# Patient Record
Sex: FEMALE | Race: WHITE | Employment: FULL TIME | URBAN - NONMETROPOLITAN AREA
[De-identification: names, ages, dates, MRNs, and addresses within clinical notes are randomized per-mention and may not be internally consistent; named-entity substitution may affect disease eponyms.]

---

## 2017-05-29 ENCOUNTER — HOSPITAL ENCOUNTER (EMERGENCY)
Age: 32
Discharge: HOME | End: 2017-05-29
Attending: FAMILY MEDICINE
Payer: COMMERCIAL

## 2017-05-29 VITALS
OXYGEN SATURATION: 99 % | RESPIRATION RATE: 18 BRPM | HEART RATE: 85 BPM | TEMPERATURE: 98.7 F | DIASTOLIC BLOOD PRESSURE: 72 MMHG | SYSTOLIC BLOOD PRESSURE: 131 MMHG

## 2017-05-29 DIAGNOSIS — M62.838 TRAPEZIUS MUSCLE SPASM: Primary | ICD-10-CM

## 2017-05-29 DIAGNOSIS — M54.2 NECK PAIN: ICD-10-CM

## 2017-05-29 PROCEDURE — 99283 EMERGENCY DEPT VISIT LOW MDM: CPT

## 2017-05-29 RX ORDER — CYCLOBENZAPRINE HCL 10 MG
10 TABLET ORAL 3 TIMES DAILY PRN
Qty: 10 TABLET | Refills: 0 | Status: SHIPPED | OUTPATIENT
Start: 2017-05-29 | End: 2017-06-08

## 2017-05-29 RX ORDER — OXYCODONE HYDROCHLORIDE AND ACETAMINOPHEN 5; 325 MG/1; MG/1
1 TABLET ORAL EVERY 4 HOURS PRN
COMMUNITY
End: 2018-01-05 | Stop reason: ALTCHOICE

## 2017-05-29 RX ORDER — IBUPROFEN 800 MG/1
800 TABLET ORAL EVERY 6 HOURS PRN
Qty: 30 TABLET | Refills: 0 | Status: SHIPPED | OUTPATIENT
Start: 2017-05-29 | End: 2019-02-04

## 2017-05-29 RX ORDER — HYDROCODONE BITARTRATE AND ACETAMINOPHEN 5; 325 MG/1; MG/1
1 TABLET ORAL EVERY 6 HOURS PRN
Qty: 10 TABLET | Refills: 0 | Status: SHIPPED | OUTPATIENT
Start: 2017-05-29 | End: 2018-01-05 | Stop reason: ALTCHOICE

## 2017-05-29 ASSESSMENT — PAIN DESCRIPTION - PAIN TYPE: TYPE: ACUTE PAIN

## 2017-05-29 ASSESSMENT — PAIN DESCRIPTION - LOCATION: LOCATION: NECK

## 2017-05-29 ASSESSMENT — PAIN SCALES - GENERAL: PAINLEVEL_OUTOF10: 9

## 2018-01-05 ENCOUNTER — OFFICE VISIT (OUTPATIENT)
Dept: OBGYN | Age: 33
End: 2018-01-05
Payer: COMMERCIAL

## 2018-01-05 ENCOUNTER — HOSPITAL ENCOUNTER (OUTPATIENT)
Age: 33
Setting detail: SPECIMEN
Discharge: HOME | End: 2018-01-05
Payer: COMMERCIAL

## 2018-01-05 VITALS
WEIGHT: 194.8 LBS | DIASTOLIC BLOOD PRESSURE: 64 MMHG | SYSTOLIC BLOOD PRESSURE: 118 MMHG | HEIGHT: 64 IN | BODY MASS INDEX: 33.26 KG/M2

## 2018-01-05 DIAGNOSIS — Z01.419 WOMEN'S ANNUAL ROUTINE GYNECOLOGICAL EXAMINATION: Primary | ICD-10-CM

## 2018-01-05 DIAGNOSIS — Z01.419 WOMEN'S ANNUAL ROUTINE GYNECOLOGICAL EXAMINATION: ICD-10-CM

## 2018-01-05 PROCEDURE — 99385 PREV VISIT NEW AGE 18-39: CPT | Performed by: OBSTETRICS & GYNECOLOGY

## 2018-01-05 PROCEDURE — G0145 SCR C/V CYTO,THINLAYER,RESCR: HCPCS

## 2018-01-05 ASSESSMENT — PATIENT HEALTH QUESTIONNAIRE - PHQ9
1. LITTLE INTEREST OR PLEASURE IN DOING THINGS: 0
SUM OF ALL RESPONSES TO PHQ QUESTIONS 1-9: 0
2. FEELING DOWN, DEPRESSED OR HOPELESS: 0
SUM OF ALL RESPONSES TO PHQ9 QUESTIONS 1 & 2: 0

## 2018-01-05 NOTE — PROGRESS NOTES
YEARLY PHYSICAL    Date of service: 2018    Ruth Mcintosh  Is a 34 y.o.   female    PT's PCP is: No primary care provider on file. : 1985                                             Subjective:       Patient's last menstrual period was 2017. Are your menses regular: yes    OB History    Para Term  AB Living   3 1 1   2     SAB TAB Ectopic Molar Multiple Live Births   2                # Outcome Date GA Lbr Luis/2nd Weight Sex Delivery Anes PTL Lv   3 Term 10/29/13 39w2d  7 lb 9.2 oz (3.435 kg) M CS-Unspec      2 2012 6w0d          1 2011 6w0d                  History   Smoking Status    Never Smoker   Smokeless Tobacco    Never Used        History   Alcohol Use No       Family History   Problem Relation Age of Onset    High Blood Pressure Father        Allergies: Sulfa antibiotics      Current Outpatient Prescriptions:     ibuprofen (ADVIL;MOTRIN) 800 MG tablet, Take 1 tablet by mouth every 6 hours as needed for Pain, Disp: 30 tablet, Rfl: 0    prenatal vitamin (PRENATAL-S) 27-0.8 MG TABS, Take 1 tablet by mouth daily. , Disp: , Rfl:     FISH OIL, by Does not apply route., Disp: , Rfl:     tamsulosin (FLOMAX) 0.4 MG capsule, Take 1 capsule by mouth daily for 10 days. , Disp: 10 capsule, Rfl: 0    History   Sexual Activity    Sexual activity: Yes    Partners: Male       Any bleeding or pain with intercourse: No    Last Yearly:  2013    Last pap: 2013    Last HPV: never    Last Mammogram: never    Last Dexascan never    Do you do self breast exams: Yes    Past Medical History:   Diagnosis Date    Kidney calculi        Past Surgical History:   Procedure Laterality Date     SECTION  10/29/2013    CHOLECYSTECTOMY      DILATION AND CURETTAGE OF UTERUS         Family History   Problem Relation Age of Onset    High Blood Pressure Father        Any family history of breast or

## 2018-01-16 LAB — CYTOLOGY REPORT: NORMAL

## 2018-12-11 ENCOUNTER — HOSPITAL ENCOUNTER (OUTPATIENT)
Age: 33
Discharge: HOME | End: 2018-12-11
Payer: COMMERCIAL

## 2018-12-11 LAB
ABSOLUTE EOS #: 0.11 K/UL (ref 0–0.44)
ABSOLUTE IMMATURE GRANULOCYTE: 0.05 K/UL (ref 0–0.3)
ABSOLUTE LYMPH #: 1.91 K/UL (ref 1.1–3.7)
ABSOLUTE MONO #: 0.63 K/UL (ref 0.1–1.2)
ALBUMIN SERPL-MCNC: 4.3 G/DL (ref 3.5–5.2)
ALBUMIN/GLOBULIN RATIO: 1.3 (ref 1–2.5)
ALP BLD-CCNC: 63 U/L (ref 35–104)
ALT SERPL-CCNC: 13 U/L (ref 5–33)
ANION GAP SERPL CALCULATED.3IONS-SCNC: 9 MMOL/L (ref 9–17)
AST SERPL-CCNC: 17 U/L
BASOPHILS # BLD: 1 % (ref 0–2)
BASOPHILS ABSOLUTE: 0.08 K/UL (ref 0–0.2)
BILIRUB SERPL-MCNC: 0.6 MG/DL (ref 0.3–1.2)
BUN BLDV-MCNC: 11 MG/DL (ref 6–20)
BUN/CREAT BLD: 18 (ref 9–20)
CALCIUM SERPL-MCNC: 9.6 MG/DL (ref 8.6–10.4)
CHLORIDE BLD-SCNC: 102 MMOL/L (ref 98–107)
CHOLESTEROL/HDL RATIO: 2.9
CHOLESTEROL: 121 MG/DL
CO2: 26 MMOL/L (ref 20–31)
CREAT SERPL-MCNC: 0.61 MG/DL (ref 0.5–0.9)
DIFFERENTIAL TYPE: ABNORMAL
EOSINOPHILS RELATIVE PERCENT: 1 % (ref 1–4)
GFR AFRICAN AMERICAN: >60 ML/MIN
GFR NON-AFRICAN AMERICAN: >60 ML/MIN
GFR SERPL CREATININE-BSD FRML MDRD: ABNORMAL ML/MIN/{1.73_M2}
GFR SERPL CREATININE-BSD FRML MDRD: ABNORMAL ML/MIN/{1.73_M2}
GLUCOSE BLD-MCNC: 111 MG/DL (ref 70–99)
HCT VFR BLD CALC: 43 % (ref 36.3–47.1)
HDLC SERPL-MCNC: 42 MG/DL
HEMOGLOBIN: 13.6 G/DL (ref 11.9–15.1)
IMMATURE GRANULOCYTES: 1 %
LDL CHOLESTEROL: 55 MG/DL (ref 0–130)
LYMPHOCYTES # BLD: 20 % (ref 24–43)
MCH RBC QN AUTO: 30.8 PG (ref 25.2–33.5)
MCHC RBC AUTO-ENTMCNC: 31.6 G/DL (ref 28.4–34.8)
MCV RBC AUTO: 97.3 FL (ref 82.6–102.9)
MONOCYTES # BLD: 7 % (ref 3–12)
NRBC AUTOMATED: 0 PER 100 WBC
PDW BLD-RTO: 12.4 % (ref 11.8–14.4)
PLATELET # BLD: 320 K/UL (ref 138–453)
PLATELET ESTIMATE: ABNORMAL
PMV BLD AUTO: 10.6 FL (ref 8.1–13.5)
POTASSIUM SERPL-SCNC: 4.3 MMOL/L (ref 3.7–5.3)
RBC # BLD: 4.42 M/UL (ref 3.95–5.11)
RBC # BLD: ABNORMAL 10*6/UL
SEG NEUTROPHILS: 70 % (ref 36–65)
SEGMENTED NEUTROPHILS ABSOLUTE COUNT: 6.63 K/UL (ref 1.5–8.1)
SODIUM BLD-SCNC: 137 MMOL/L (ref 135–144)
TOTAL PROTEIN: 7.7 G/DL (ref 6.4–8.3)
TRIGL SERPL-MCNC: 122 MG/DL
TSH SERPL DL<=0.05 MIU/L-ACNC: 1.64 MIU/L (ref 0.3–5)
VLDLC SERPL CALC-MCNC: NORMAL MG/DL (ref 1–30)
WBC # BLD: 9.4 K/UL (ref 3.5–11.3)
WBC # BLD: ABNORMAL 10*3/UL

## 2018-12-11 PROCEDURE — 84443 ASSAY THYROID STIM HORMONE: CPT

## 2018-12-11 PROCEDURE — 36415 COLL VENOUS BLD VENIPUNCTURE: CPT

## 2018-12-11 PROCEDURE — 80061 LIPID PANEL: CPT

## 2018-12-11 PROCEDURE — 85025 COMPLETE CBC W/AUTO DIFF WBC: CPT

## 2018-12-11 PROCEDURE — 80053 COMPREHEN METABOLIC PANEL: CPT

## 2019-02-03 ENCOUNTER — APPOINTMENT (OUTPATIENT)
Dept: ULTRASOUND IMAGING | Age: 34
End: 2019-02-03
Payer: COMMERCIAL

## 2019-02-03 ENCOUNTER — HOSPITAL ENCOUNTER (EMERGENCY)
Age: 34
Discharge: HOME | End: 2019-02-03
Payer: COMMERCIAL

## 2019-02-03 ENCOUNTER — TELEPHONE (OUTPATIENT)
Dept: OBGYN | Age: 34
End: 2019-02-03

## 2019-02-03 VITALS
DIASTOLIC BLOOD PRESSURE: 78 MMHG | OXYGEN SATURATION: 98 % | HEART RATE: 80 BPM | SYSTOLIC BLOOD PRESSURE: 132 MMHG | TEMPERATURE: 98.5 F | WEIGHT: 190 LBS | RESPIRATION RATE: 18 BRPM | BODY MASS INDEX: 32.61 KG/M2

## 2019-02-03 DIAGNOSIS — O20.0 THREATENED MISCARRIAGE IN EARLY PREGNANCY: Primary | ICD-10-CM

## 2019-02-03 LAB
-: ABNORMAL
ABO/RH: NORMAL
ALBUMIN SERPL-MCNC: 4.1 G/DL (ref 3.5–5.2)
ALBUMIN/GLOBULIN RATIO: 1.2 (ref 1–2.5)
ALP BLD-CCNC: 65 U/L (ref 35–104)
ALT SERPL-CCNC: 19 U/L (ref 5–33)
AMORPHOUS: ABNORMAL
AMPHETAMINE SCREEN URINE: NEGATIVE
ANION GAP SERPL CALCULATED.3IONS-SCNC: 10 MMOL/L (ref 9–17)
AST SERPL-CCNC: 21 U/L
BACTERIA: ABNORMAL
BARBITURATE SCREEN URINE: NEGATIVE
BENZODIAZEPINE SCREEN, URINE: NEGATIVE
BILIRUB SERPL-MCNC: 0.45 MG/DL (ref 0.3–1.2)
BILIRUBIN URINE: NEGATIVE
BUN BLDV-MCNC: 11 MG/DL (ref 6–20)
BUN/CREAT BLD: 19 (ref 9–20)
BUPRENORPHINE URINE: NEGATIVE
CALCIUM SERPL-MCNC: 9.4 MG/DL (ref 8.6–10.4)
CANNABINOID SCREEN URINE: NEGATIVE
CASTS UA: ABNORMAL /LPF
CHLORIDE BLD-SCNC: 100 MMOL/L (ref 98–107)
CO2: 24 MMOL/L (ref 20–31)
COCAINE METABOLITE, URINE: NEGATIVE
COLOR: YELLOW
COMMENT UA: ABNORMAL
CREAT SERPL-MCNC: 0.59 MG/DL (ref 0.5–0.9)
CRYSTALS, UA: ABNORMAL /HPF
EPITHELIAL CELLS UA: ABNORMAL /HPF (ref 0–25)
GFR AFRICAN AMERICAN: >60 ML/MIN
GFR NON-AFRICAN AMERICAN: >60 ML/MIN
GFR SERPL CREATININE-BSD FRML MDRD: ABNORMAL ML/MIN/{1.73_M2}
GFR SERPL CREATININE-BSD FRML MDRD: ABNORMAL ML/MIN/{1.73_M2}
GLUCOSE BLD-MCNC: 134 MG/DL (ref 70–99)
GLUCOSE URINE: NEGATIVE
HCG QUANTITATIVE: ABNORMAL IU/L
HCT VFR BLD CALC: 39.8 % (ref 36.3–47.1)
HEMOGLOBIN: 13.1 G/DL (ref 11.9–15.1)
KETONES, URINE: NEGATIVE
LEUKOCYTE ESTERASE, URINE: NEGATIVE
MCH RBC QN AUTO: 30.5 PG (ref 25.2–33.5)
MCHC RBC AUTO-ENTMCNC: 32.9 G/DL (ref 28.4–34.8)
MCV RBC AUTO: 92.8 FL (ref 82.6–102.9)
MDMA URINE: NORMAL
METHADONE SCREEN, URINE: NEGATIVE
METHAMPHETAMINE, URINE: NEGATIVE
MUCUS: ABNORMAL
NITRITE, URINE: NEGATIVE
NRBC AUTOMATED: 0 PER 100 WBC
OPIATES, URINE: NEGATIVE
OTHER OBSERVATIONS UA: ABNORMAL
OXYCODONE SCREEN URINE: NEGATIVE
PDW BLD-RTO: 12.9 % (ref 11.8–14.4)
PH UA: 6 (ref 5–9)
PHENCYCLIDINE, URINE: NEGATIVE
PLATELET # BLD: 290 K/UL (ref 138–453)
PMV BLD AUTO: 10.6 FL (ref 8.1–13.5)
POTASSIUM SERPL-SCNC: 3.9 MMOL/L (ref 3.7–5.3)
PROPOXYPHENE, URINE: NEGATIVE
PROTEIN UA: NEGATIVE
RBC # BLD: 4.29 M/UL (ref 3.95–5.11)
RBC UA: ABNORMAL /HPF (ref 0–2)
RENAL EPITHELIAL, UA: ABNORMAL /HPF
SODIUM BLD-SCNC: 134 MMOL/L (ref 135–144)
SPECIFIC GRAVITY UA: 1.02 (ref 1.01–1.02)
TEST INFORMATION: NORMAL
TOTAL PROTEIN: 7.5 G/DL (ref 6.4–8.3)
TRICHOMONAS: ABNORMAL
TRICYCLIC ANTIDEPRESSANTS, UR: NEGATIVE
TURBIDITY: CLEAR
URINE HGB: ABNORMAL
UROBILINOGEN, URINE: NORMAL
WBC # BLD: 13.3 K/UL (ref 3.5–11.3)
WBC UA: ABNORMAL /HPF (ref 0–5)
YEAST: ABNORMAL

## 2019-02-03 PROCEDURE — 80053 COMPREHEN METABOLIC PANEL: CPT

## 2019-02-03 PROCEDURE — 86901 BLOOD TYPING SEROLOGIC RH(D): CPT

## 2019-02-03 PROCEDURE — 36415 COLL VENOUS BLD VENIPUNCTURE: CPT

## 2019-02-03 PROCEDURE — 84702 CHORIONIC GONADOTROPIN TEST: CPT

## 2019-02-03 PROCEDURE — 76817 TRANSVAGINAL US OBSTETRIC: CPT

## 2019-02-03 PROCEDURE — 99284 EMERGENCY DEPT VISIT MOD MDM: CPT

## 2019-02-03 PROCEDURE — 80306 DRUG TEST PRSMV INSTRMNT: CPT

## 2019-02-03 PROCEDURE — 81001 URINALYSIS AUTO W/SCOPE: CPT

## 2019-02-03 PROCEDURE — 85027 COMPLETE CBC AUTOMATED: CPT

## 2019-02-03 PROCEDURE — 86900 BLOOD TYPING SEROLOGIC ABO: CPT

## 2019-02-03 ASSESSMENT — ENCOUNTER SYMPTOMS
EYE REDNESS: 0
EYE DISCHARGE: 0
ABDOMINAL PAIN: 0
SHORTNESS OF BREATH: 0
SORE THROAT: 0
VOMITING: 0
DIARRHEA: 0
COUGH: 0
BACK PAIN: 0
NAUSEA: 0
BLOOD IN STOOL: 0
RHINORRHEA: 0
CONSTIPATION: 0
WHEEZING: 0
CHEST TIGHTNESS: 0

## 2019-02-03 ASSESSMENT — PAIN DESCRIPTION - DESCRIPTORS: DESCRIPTORS: CRAMPING

## 2019-02-03 ASSESSMENT — PAIN DESCRIPTION - ORIENTATION: ORIENTATION: LOWER

## 2019-02-03 ASSESSMENT — PAIN SCALES - GENERAL: PAINLEVEL_OUTOF10: 3

## 2019-02-03 ASSESSMENT — PAIN DESCRIPTION - PAIN TYPE: TYPE: ACUTE PAIN

## 2019-02-03 ASSESSMENT — PAIN DESCRIPTION - LOCATION: LOCATION: ABDOMEN

## 2019-02-04 ENCOUNTER — ROUTINE PRENATAL (OUTPATIENT)
Dept: OBGYN | Age: 34
End: 2019-02-04
Payer: COMMERCIAL

## 2019-02-04 VITALS
HEIGHT: 64 IN | BODY MASS INDEX: 33.02 KG/M2 | SYSTOLIC BLOOD PRESSURE: 130 MMHG | WEIGHT: 193.4 LBS | DIASTOLIC BLOOD PRESSURE: 82 MMHG

## 2019-02-04 DIAGNOSIS — O20.9 BLEEDING IN EARLY PREGNANCY: Primary | ICD-10-CM

## 2019-02-04 LAB
CRL: 0.41 CM
SAC DIAMETER: NORMAL CM

## 2019-02-04 PROCEDURE — G8417 CALC BMI ABV UP PARAM F/U: HCPCS | Performed by: ADVANCED PRACTICE MIDWIFE

## 2019-02-04 PROCEDURE — G8427 DOCREV CUR MEDS BY ELIG CLIN: HCPCS | Performed by: ADVANCED PRACTICE MIDWIFE

## 2019-02-04 PROCEDURE — 1036F TOBACCO NON-USER: CPT | Performed by: ADVANCED PRACTICE MIDWIFE

## 2019-02-04 PROCEDURE — 76817 TRANSVAGINAL US OBSTETRIC: CPT | Performed by: OBSTETRICS & GYNECOLOGY

## 2019-02-04 PROCEDURE — G8484 FLU IMMUNIZE NO ADMIN: HCPCS | Performed by: ADVANCED PRACTICE MIDWIFE

## 2019-02-04 PROCEDURE — 99212 OFFICE O/P EST SF 10 MIN: CPT | Performed by: ADVANCED PRACTICE MIDWIFE

## 2019-02-08 ENCOUNTER — TELEPHONE (OUTPATIENT)
Dept: OBGYN | Age: 34
End: 2019-02-08

## 2019-02-08 ENCOUNTER — ROUTINE PRENATAL (OUTPATIENT)
Dept: OBGYN | Age: 34
End: 2019-02-08
Payer: COMMERCIAL

## 2019-02-08 DIAGNOSIS — Z3A.01 6 WEEKS GESTATION OF PREGNANCY: ICD-10-CM

## 2019-02-08 DIAGNOSIS — O20.9 VAGINAL BLEEDING IN PREGNANCY, FIRST TRIMESTER: Primary | ICD-10-CM

## 2019-02-08 LAB
ABDOMINAL CIRCUMFERENCE: NORMAL CM
BIPARIETAL DIAMETER: NORMAL CM
ESTIMATED FETAL WEIGHT: NORMAL GRAMS
FEMORAL DIAMETER: NORMAL CM
HC/AC: NORMAL
HEAD CIRCUMFERENCE: NORMAL CM

## 2019-02-08 PROCEDURE — 76815 OB US LIMITED FETUS(S): CPT | Performed by: OBSTETRICS & GYNECOLOGY

## 2019-02-14 ENCOUNTER — INITIAL PRENATAL (OUTPATIENT)
Dept: OBGYN | Age: 34
End: 2019-02-14
Payer: COMMERCIAL

## 2019-02-14 VITALS — DIASTOLIC BLOOD PRESSURE: 80 MMHG | BODY MASS INDEX: 33.3 KG/M2 | SYSTOLIC BLOOD PRESSURE: 124 MMHG | WEIGHT: 194 LBS

## 2019-02-14 DIAGNOSIS — O02.1 MISSED AB: Primary | ICD-10-CM

## 2019-02-14 LAB
CRL: ABNORMAL CM
SAC DIAMETER: ABNORMAL CM

## 2019-02-14 PROCEDURE — 99213 OFFICE O/P EST LOW 20 MIN: CPT | Performed by: ADVANCED PRACTICE MIDWIFE

## 2019-02-14 PROCEDURE — G8417 CALC BMI ABV UP PARAM F/U: HCPCS | Performed by: ADVANCED PRACTICE MIDWIFE

## 2019-02-14 PROCEDURE — 76817 TRANSVAGINAL US OBSTETRIC: CPT | Performed by: OBSTETRICS & GYNECOLOGY

## 2019-02-14 PROCEDURE — G8428 CUR MEDS NOT DOCUMENT: HCPCS | Performed by: ADVANCED PRACTICE MIDWIFE

## 2019-02-14 PROCEDURE — G8484 FLU IMMUNIZE NO ADMIN: HCPCS | Performed by: ADVANCED PRACTICE MIDWIFE

## 2019-02-14 PROCEDURE — 1036F TOBACCO NON-USER: CPT | Performed by: ADVANCED PRACTICE MIDWIFE

## 2019-02-16 ENCOUNTER — TELEPHONE (OUTPATIENT)
Dept: OBGYN | Age: 34
End: 2019-02-16

## 2019-02-18 DIAGNOSIS — Z01.818 PRE-OP TESTING: Primary | ICD-10-CM

## 2019-02-18 DIAGNOSIS — R11.0 NAUSEA: Primary | ICD-10-CM

## 2019-02-18 RX ORDER — ONDANSETRON 4 MG/1
4 TABLET, ORALLY DISINTEGRATING ORAL EVERY 8 HOURS PRN
Qty: 30 TABLET | Refills: 0 | Status: ON HOLD | OUTPATIENT
Start: 2019-02-18 | End: 2019-02-20 | Stop reason: HOSPADM

## 2019-02-19 ENCOUNTER — ANESTHESIA EVENT (OUTPATIENT)
Dept: OPERATING ROOM | Age: 34
End: 2019-02-19
Payer: COMMERCIAL

## 2019-02-19 ENCOUNTER — HOSPITAL ENCOUNTER (OUTPATIENT)
Age: 34
Discharge: HOME | End: 2019-02-19
Payer: COMMERCIAL

## 2019-02-19 DIAGNOSIS — Z01.818 PRE-OP TESTING: ICD-10-CM

## 2019-02-19 LAB
ABO/RH: NORMAL
ANTIBODY SCREEN: NEGATIVE
ARM BAND NUMBER: NORMAL
EXPIRATION DATE: NORMAL

## 2019-02-19 PROCEDURE — 86900 BLOOD TYPING SEROLOGIC ABO: CPT

## 2019-02-19 PROCEDURE — 36415 COLL VENOUS BLD VENIPUNCTURE: CPT

## 2019-02-19 PROCEDURE — 86901 BLOOD TYPING SEROLOGIC RH(D): CPT

## 2019-02-19 PROCEDURE — 86850 RBC ANTIBODY SCREEN: CPT

## 2019-02-20 ENCOUNTER — ANESTHESIA (OUTPATIENT)
Dept: OPERATING ROOM | Age: 34
End: 2019-02-20
Payer: COMMERCIAL

## 2019-02-20 ENCOUNTER — HOSPITAL ENCOUNTER (OUTPATIENT)
Age: 34
Setting detail: OUTPATIENT SURGERY
Discharge: HOME | End: 2019-02-20
Attending: OBSTETRICS & GYNECOLOGY | Admitting: OBSTETRICS & GYNECOLOGY
Payer: COMMERCIAL

## 2019-02-20 VITALS — SYSTOLIC BLOOD PRESSURE: 92 MMHG | DIASTOLIC BLOOD PRESSURE: 50 MMHG | OXYGEN SATURATION: 91 %

## 2019-02-20 VITALS
WEIGHT: 194 LBS | OXYGEN SATURATION: 99 % | RESPIRATION RATE: 18 BRPM | BODY MASS INDEX: 33.12 KG/M2 | HEIGHT: 64 IN | SYSTOLIC BLOOD PRESSURE: 99 MMHG | TEMPERATURE: 97.5 F | HEART RATE: 84 BPM | DIASTOLIC BLOOD PRESSURE: 54 MMHG

## 2019-02-20 DIAGNOSIS — O02.1 MISSED AB: Primary | ICD-10-CM

## 2019-02-20 PROCEDURE — 7100000010 HC PHASE II RECOVERY - FIRST 15 MIN: Performed by: OBSTETRICS & GYNECOLOGY

## 2019-02-20 PROCEDURE — 59820 CARE OF MISCARRIAGE: CPT | Performed by: OBSTETRICS & GYNECOLOGY

## 2019-02-20 PROCEDURE — 6360000002 HC RX W HCPCS: Performed by: OBSTETRICS & GYNECOLOGY

## 2019-02-20 PROCEDURE — 3700000001 HC ADD 15 MINUTES (ANESTHESIA): Performed by: OBSTETRICS & GYNECOLOGY

## 2019-02-20 PROCEDURE — 3600000012 HC SURGERY LEVEL 2 ADDTL 15MIN: Performed by: OBSTETRICS & GYNECOLOGY

## 2019-02-20 PROCEDURE — 6370000000 HC RX 637 (ALT 250 FOR IP): Performed by: OBSTETRICS & GYNECOLOGY

## 2019-02-20 PROCEDURE — 88305 TISSUE EXAM BY PATHOLOGIST: CPT

## 2019-02-20 PROCEDURE — 3600000002 HC SURGERY LEVEL 2 BASE: Performed by: OBSTETRICS & GYNECOLOGY

## 2019-02-20 PROCEDURE — 7100000011 HC PHASE II RECOVERY - ADDTL 15 MIN: Performed by: OBSTETRICS & GYNECOLOGY

## 2019-02-20 PROCEDURE — 6360000002 HC RX W HCPCS: Performed by: NURSE ANESTHETIST, CERTIFIED REGISTERED

## 2019-02-20 PROCEDURE — 3700000000 HC ANESTHESIA ATTENDED CARE: Performed by: OBSTETRICS & GYNECOLOGY

## 2019-02-20 PROCEDURE — 2500000003 HC RX 250 WO HCPCS: Performed by: NURSE ANESTHETIST, CERTIFIED REGISTERED

## 2019-02-20 PROCEDURE — 2709999900 HC NON-CHARGEABLE SUPPLY: Performed by: OBSTETRICS & GYNECOLOGY

## 2019-02-20 PROCEDURE — 2580000003 HC RX 258: Performed by: OBSTETRICS & GYNECOLOGY

## 2019-02-20 RX ORDER — PROPOFOL 10 MG/ML
INJECTION, EMULSION INTRAVENOUS PRN
Status: DISCONTINUED | OUTPATIENT
Start: 2019-02-20 | End: 2019-02-20 | Stop reason: SDUPTHER

## 2019-02-20 RX ORDER — DIMENHYDRINATE 50 MG/1
50 TABLET ORAL ONCE
Status: COMPLETED | OUTPATIENT
Start: 2019-02-20 | End: 2019-02-20

## 2019-02-20 RX ORDER — SODIUM CHLORIDE, SODIUM LACTATE, POTASSIUM CHLORIDE, CALCIUM CHLORIDE 600; 310; 30; 20 MG/100ML; MG/100ML; MG/100ML; MG/100ML
INJECTION, SOLUTION INTRAVENOUS CONTINUOUS
Status: DISCONTINUED | OUTPATIENT
Start: 2019-02-20 | End: 2019-02-20 | Stop reason: CLARIF

## 2019-02-20 RX ORDER — ONDANSETRON 2 MG/ML
4 INJECTION INTRAMUSCULAR; INTRAVENOUS
Status: DISCONTINUED | OUTPATIENT
Start: 2019-02-20 | End: 2019-02-20 | Stop reason: HOSPADM

## 2019-02-20 RX ORDER — MIDAZOLAM HYDROCHLORIDE 1 MG/ML
INJECTION INTRAMUSCULAR; INTRAVENOUS PRN
Status: DISCONTINUED | OUTPATIENT
Start: 2019-02-20 | End: 2019-02-20 | Stop reason: SDUPTHER

## 2019-02-20 RX ORDER — ACETAMINOPHEN 325 MG/1
650 TABLET ORAL EVERY 4 HOURS PRN
Status: CANCELLED | OUTPATIENT
Start: 2019-02-20

## 2019-02-20 RX ORDER — SODIUM CHLORIDE, SODIUM LACTATE, POTASSIUM CHLORIDE, CALCIUM CHLORIDE 600; 310; 30; 20 MG/100ML; MG/100ML; MG/100ML; MG/100ML
INJECTION, SOLUTION INTRAVENOUS CONTINUOUS
Status: DISCONTINUED | OUTPATIENT
Start: 2019-02-20 | End: 2019-02-20 | Stop reason: HOSPADM

## 2019-02-20 RX ORDER — HYDROCODONE BITARTRATE AND ACETAMINOPHEN 5; 325 MG/1; MG/1
2 TABLET ORAL EVERY 4 HOURS PRN
Status: CANCELLED | OUTPATIENT
Start: 2019-02-20

## 2019-02-20 RX ORDER — LIDOCAINE HYDROCHLORIDE 20 MG/ML
INJECTION, SOLUTION EPIDURAL; INFILTRATION; INTRACAUDAL; PERINEURAL PRN
Status: DISCONTINUED | OUTPATIENT
Start: 2019-02-20 | End: 2019-02-20 | Stop reason: SDUPTHER

## 2019-02-20 RX ORDER — METOCLOPRAMIDE HYDROCHLORIDE 5 MG/ML
INJECTION INTRAMUSCULAR; INTRAVENOUS PRN
Status: DISCONTINUED | OUTPATIENT
Start: 2019-02-20 | End: 2019-02-20 | Stop reason: SDUPTHER

## 2019-02-20 RX ORDER — METOCLOPRAMIDE HYDROCHLORIDE 5 MG/ML
10 INJECTION INTRAMUSCULAR; INTRAVENOUS
Status: DISCONTINUED | OUTPATIENT
Start: 2019-02-20 | End: 2019-02-20 | Stop reason: HOSPADM

## 2019-02-20 RX ORDER — HYDROCODONE BITARTRATE AND ACETAMINOPHEN 5; 325 MG/1; MG/1
1 TABLET ORAL EVERY 6 HOURS PRN
Qty: 12 TABLET | Refills: 0 | Status: SHIPPED | OUTPATIENT
Start: 2019-02-20 | End: 2019-02-23

## 2019-02-20 RX ORDER — CEFAZOLIN SODIUM 2 G/50ML
2 SOLUTION INTRAVENOUS
Status: COMPLETED | OUTPATIENT
Start: 2019-02-20 | End: 2019-02-20

## 2019-02-20 RX ORDER — SODIUM CHLORIDE, SODIUM LACTATE, POTASSIUM CHLORIDE, CALCIUM CHLORIDE 600; 310; 30; 20 MG/100ML; MG/100ML; MG/100ML; MG/100ML
INJECTION, SOLUTION INTRAVENOUS CONTINUOUS
Status: CANCELLED | OUTPATIENT
Start: 2019-02-20

## 2019-02-20 RX ORDER — ONDANSETRON 2 MG/ML
4 INJECTION INTRAMUSCULAR; INTRAVENOUS EVERY 8 HOURS PRN
Status: CANCELLED | OUTPATIENT
Start: 2019-02-20

## 2019-02-20 RX ORDER — HYDROCODONE BITARTRATE AND ACETAMINOPHEN 5; 325 MG/1; MG/1
1 TABLET ORAL EVERY 4 HOURS PRN
Status: CANCELLED | OUTPATIENT
Start: 2019-02-20

## 2019-02-20 RX ORDER — ACETAMINOPHEN 325 MG/1
650 TABLET ORAL ONCE
Status: COMPLETED | OUTPATIENT
Start: 2019-02-20 | End: 2019-02-20

## 2019-02-20 RX ORDER — KETOROLAC TROMETHAMINE 30 MG/ML
INJECTION, SOLUTION INTRAMUSCULAR; INTRAVENOUS PRN
Status: DISCONTINUED | OUTPATIENT
Start: 2019-02-20 | End: 2019-02-20 | Stop reason: SDUPTHER

## 2019-02-20 RX ORDER — SODIUM CHLORIDE 0.9 % (FLUSH) 0.9 %
10 SYRINGE (ML) INJECTION PRN
Status: CANCELLED | OUTPATIENT
Start: 2019-02-20

## 2019-02-20 RX ORDER — ONDANSETRON 2 MG/ML
INJECTION INTRAMUSCULAR; INTRAVENOUS PRN
Status: DISCONTINUED | OUTPATIENT
Start: 2019-02-20 | End: 2019-02-20 | Stop reason: SDUPTHER

## 2019-02-20 RX ORDER — OXYCODONE HYDROCHLORIDE 5 MG/1
5 TABLET ORAL
Status: COMPLETED | OUTPATIENT
Start: 2019-02-20 | End: 2019-02-20

## 2019-02-20 RX ORDER — SODIUM CHLORIDE 0.9 % (FLUSH) 0.9 %
10 SYRINGE (ML) INJECTION EVERY 12 HOURS SCHEDULED
Status: CANCELLED | OUTPATIENT
Start: 2019-02-20

## 2019-02-20 RX ORDER — FENTANYL CITRATE 50 UG/ML
INJECTION, SOLUTION INTRAMUSCULAR; INTRAVENOUS PRN
Status: DISCONTINUED | OUTPATIENT
Start: 2019-02-20 | End: 2019-02-20 | Stop reason: SDUPTHER

## 2019-02-20 RX ORDER — PROPOFOL 10 MG/ML
INJECTION, EMULSION INTRAVENOUS CONTINUOUS PRN
Status: DISCONTINUED | OUTPATIENT
Start: 2019-02-20 | End: 2019-02-20 | Stop reason: SDUPTHER

## 2019-02-20 RX ADMIN — PROPOFOL 20 MG: 10 INJECTION, EMULSION INTRAVENOUS at 08:22

## 2019-02-20 RX ADMIN — METOCLOPRAMIDE 10 MG: 5 INJECTION, SOLUTION INTRAMUSCULAR; INTRAVENOUS at 07:53

## 2019-02-20 RX ADMIN — PROPOFOL 50 MG: 10 INJECTION, EMULSION INTRAVENOUS at 08:06

## 2019-02-20 RX ADMIN — DIMENHYDRINATE 50 MG: 50 TABLET ORAL at 06:26

## 2019-02-20 RX ADMIN — OXYCODONE HYDROCHLORIDE 5 MG: 5 TABLET ORAL at 09:01

## 2019-02-20 RX ADMIN — MIDAZOLAM HYDROCHLORIDE 2 MG: 1 INJECTION, SOLUTION INTRAMUSCULAR; INTRAVENOUS at 08:02

## 2019-02-20 RX ADMIN — ONDANSETRON 4 MG: 2 INJECTION INTRAMUSCULAR; INTRAVENOUS at 07:53

## 2019-02-20 RX ADMIN — CEFAZOLIN SODIUM 2 G: 2 SOLUTION INTRAVENOUS at 08:01

## 2019-02-20 RX ADMIN — ACETAMINOPHEN 650 MG: 325 TABLET, FILM COATED ORAL at 06:26

## 2019-02-20 RX ADMIN — KETOROLAC TROMETHAMINE 30 MG: 30 INJECTION, SOLUTION INTRAMUSCULAR at 08:24

## 2019-02-20 RX ADMIN — PROPOFOL 200 MCG/KG/MIN: 10 INJECTION, EMULSION INTRAVENOUS at 08:06

## 2019-02-20 RX ADMIN — FENTANYL CITRATE 50 MCG: 50 INJECTION INTRAMUSCULAR; INTRAVENOUS at 08:17

## 2019-02-20 RX ADMIN — SODIUM CHLORIDE, POTASSIUM CHLORIDE, SODIUM LACTATE AND CALCIUM CHLORIDE: 600; 310; 30; 20 INJECTION, SOLUTION INTRAVENOUS at 06:43

## 2019-02-20 RX ADMIN — LIDOCAINE HYDROCHLORIDE 100 MG: 20 INJECTION, SOLUTION EPIDURAL; INFILTRATION; INTRACAUDAL at 08:06

## 2019-02-20 RX ADMIN — PROPOFOL 40 MG: 10 INJECTION, EMULSION INTRAVENOUS at 08:09

## 2019-02-20 RX ADMIN — FENTANYL CITRATE 50 MCG: 50 INJECTION INTRAMUSCULAR; INTRAVENOUS at 08:09

## 2019-02-20 ASSESSMENT — PULMONARY FUNCTION TESTS
PIF_VALUE: 1
PIF_VALUE: 0
PIF_VALUE: 1
PIF_VALUE: 0
PIF_VALUE: 1
PIF_VALUE: 0
PIF_VALUE: 1

## 2019-02-20 ASSESSMENT — PAIN DESCRIPTION - PAIN TYPE
TYPE: ACUTE PAIN;SURGICAL PAIN
TYPE: ACUTE PAIN;SURGICAL PAIN

## 2019-02-20 ASSESSMENT — PAIN DESCRIPTION - DESCRIPTORS
DESCRIPTORS: SORE;DISCOMFORT
DESCRIPTORS: SORE
DESCRIPTORS: SORE;DISCOMFORT

## 2019-02-20 ASSESSMENT — PAIN SCALES - GENERAL
PAINLEVEL_OUTOF10: 5
PAINLEVEL_OUTOF10: 3
PAINLEVEL_OUTOF10: 5
PAINLEVEL_OUTOF10: 0
PAINLEVEL_OUTOF10: 0
PAINLEVEL_OUTOF10: 5

## 2019-02-20 ASSESSMENT — PAIN DESCRIPTION - LOCATION
LOCATION: VAGINA

## 2019-02-21 LAB — SURGICAL PATHOLOGY REPORT: NORMAL

## 2019-02-28 ENCOUNTER — OFFICE VISIT (OUTPATIENT)
Dept: OBGYN | Age: 34
End: 2019-02-28

## 2019-02-28 VITALS
SYSTOLIC BLOOD PRESSURE: 126 MMHG | DIASTOLIC BLOOD PRESSURE: 80 MMHG | WEIGHT: 195 LBS | BODY MASS INDEX: 33.29 KG/M2 | HEIGHT: 64 IN

## 2019-02-28 DIAGNOSIS — O03.4 INCOMPLETE ABORTION: Primary | ICD-10-CM

## 2019-02-28 PROCEDURE — 99024 POSTOP FOLLOW-UP VISIT: CPT | Performed by: ADVANCED PRACTICE MIDWIFE

## 2019-02-28 ASSESSMENT — PATIENT HEALTH QUESTIONNAIRE - PHQ9
SUM OF ALL RESPONSES TO PHQ QUESTIONS 1-9: 0
SUM OF ALL RESPONSES TO PHQ9 QUESTIONS 1 & 2: 0
SUM OF ALL RESPONSES TO PHQ QUESTIONS 1-9: 0
1. LITTLE INTEREST OR PLEASURE IN DOING THINGS: 0
2. FEELING DOWN, DEPRESSED OR HOPELESS: 0

## 2019-03-07 ENCOUNTER — HOSPITAL ENCOUNTER (OUTPATIENT)
Age: 34
Discharge: HOME | End: 2019-03-07
Payer: COMMERCIAL

## 2019-03-07 DIAGNOSIS — O03.9 MISCARRIAGE: Primary | ICD-10-CM

## 2019-03-07 DIAGNOSIS — O03.4 INCOMPLETE ABORTION: ICD-10-CM

## 2019-03-07 LAB — HCG QUANTITATIVE: 149 IU/L

## 2019-03-07 PROCEDURE — 84702 CHORIONIC GONADOTROPIN TEST: CPT

## 2019-03-07 PROCEDURE — 36415 COLL VENOUS BLD VENIPUNCTURE: CPT | Performed by: ADVANCED PRACTICE MIDWIFE

## 2019-03-07 PROCEDURE — 36415 COLL VENOUS BLD VENIPUNCTURE: CPT

## 2019-03-22 ENCOUNTER — HOSPITAL ENCOUNTER (OUTPATIENT)
Age: 34
Discharge: HOME | End: 2019-03-22
Payer: COMMERCIAL

## 2019-03-22 DIAGNOSIS — O03.9 MISCARRIAGE: ICD-10-CM

## 2019-03-22 LAB — HCG QUANTITATIVE: 8 IU/L

## 2019-03-22 PROCEDURE — 84702 CHORIONIC GONADOTROPIN TEST: CPT

## 2019-03-22 PROCEDURE — 36415 COLL VENOUS BLD VENIPUNCTURE: CPT

## 2019-11-07 ENCOUNTER — HOSPITAL ENCOUNTER (OUTPATIENT)
Age: 34
Setting detail: SPECIMEN
Discharge: HOME | End: 2019-11-07
Payer: COMMERCIAL

## 2019-11-07 LAB
ABSOLUTE EOS #: 0.11 K/UL (ref 0–0.44)
ABSOLUTE IMMATURE GRANULOCYTE: 0.05 K/UL (ref 0–0.3)
ABSOLUTE LYMPH #: 1.87 K/UL (ref 1.1–3.7)
ABSOLUTE MONO #: 0.69 K/UL (ref 0.1–1.2)
ALBUMIN SERPL-MCNC: 4.1 G/DL (ref 3.5–5.2)
ALBUMIN/GLOBULIN RATIO: 1.2 (ref 1–2.5)
ALP BLD-CCNC: 74 U/L (ref 35–104)
ALT SERPL-CCNC: 14 U/L (ref 5–33)
ANION GAP SERPL CALCULATED.3IONS-SCNC: 11 MMOL/L (ref 9–17)
AST SERPL-CCNC: 19 U/L
BASOPHILS # BLD: 1 % (ref 0–2)
BASOPHILS ABSOLUTE: 0.06 K/UL (ref 0–0.2)
BILIRUB SERPL-MCNC: 0.42 MG/DL (ref 0.3–1.2)
BUN BLDV-MCNC: 13 MG/DL (ref 6–20)
BUN/CREAT BLD: ABNORMAL (ref 9–20)
CALCIUM SERPL-MCNC: 9.2 MG/DL (ref 8.6–10.4)
CHLORIDE BLD-SCNC: 104 MMOL/L (ref 98–107)
CHOLESTEROL/HDL RATIO: 3.3
CHOLESTEROL: 122 MG/DL
CO2: 24 MMOL/L (ref 20–31)
CREAT SERPL-MCNC: 0.73 MG/DL (ref 0.5–0.9)
DIFFERENTIAL TYPE: ABNORMAL
EOSINOPHILS RELATIVE PERCENT: 1 % (ref 1–4)
GFR AFRICAN AMERICAN: >60 ML/MIN
GFR NON-AFRICAN AMERICAN: >60 ML/MIN
GFR SERPL CREATININE-BSD FRML MDRD: ABNORMAL ML/MIN/{1.73_M2}
GFR SERPL CREATININE-BSD FRML MDRD: ABNORMAL ML/MIN/{1.73_M2}
GLUCOSE BLD-MCNC: 134 MG/DL (ref 70–99)
HCT VFR BLD CALC: 41.4 % (ref 36.3–47.1)
HDLC SERPL-MCNC: 37 MG/DL
HEMOGLOBIN: 12.8 G/DL (ref 11.9–15.1)
IMMATURE GRANULOCYTES: 1 %
LDL CHOLESTEROL: 59 MG/DL (ref 0–130)
LYMPHOCYTES # BLD: 22 % (ref 24–43)
MCH RBC QN AUTO: 30.3 PG (ref 25.2–33.5)
MCHC RBC AUTO-ENTMCNC: 30.9 G/DL (ref 28.4–34.8)
MCV RBC AUTO: 97.9 FL (ref 82.6–102.9)
MONOCYTES # BLD: 8 % (ref 3–12)
NRBC AUTOMATED: 0 PER 100 WBC
PDW BLD-RTO: 12.9 % (ref 11.8–14.4)
PLATELET # BLD: 321 K/UL (ref 138–453)
PLATELET ESTIMATE: ABNORMAL
PMV BLD AUTO: 11.5 FL (ref 8.1–13.5)
POTASSIUM SERPL-SCNC: 4.6 MMOL/L (ref 3.7–5.3)
RBC # BLD: 4.23 M/UL (ref 3.95–5.11)
RBC # BLD: ABNORMAL 10*6/UL
SEG NEUTROPHILS: 67 % (ref 36–65)
SEGMENTED NEUTROPHILS ABSOLUTE COUNT: 5.81 K/UL (ref 1.5–8.1)
SODIUM BLD-SCNC: 139 MMOL/L (ref 135–144)
TOTAL PROTEIN: 7.5 G/DL (ref 6.4–8.3)
TRIGL SERPL-MCNC: 128 MG/DL
TSH SERPL DL<=0.05 MIU/L-ACNC: 1.77 MIU/L (ref 0.3–5)
VLDLC SERPL CALC-MCNC: ABNORMAL MG/DL (ref 1–30)
WBC # BLD: 8.6 K/UL (ref 3.5–11.3)
WBC # BLD: ABNORMAL 10*3/UL

## 2019-11-26 ENCOUNTER — HOSPITAL ENCOUNTER (OUTPATIENT)
Age: 34
Setting detail: SPECIMEN
Discharge: HOME | End: 2019-11-26
Payer: COMMERCIAL

## 2019-11-26 ENCOUNTER — OFFICE VISIT (OUTPATIENT)
Dept: PRIMARY CARE CLINIC | Age: 34
End: 2019-11-26
Payer: COMMERCIAL

## 2019-11-26 VITALS
HEIGHT: 64 IN | SYSTOLIC BLOOD PRESSURE: 121 MMHG | HEART RATE: 96 BPM | DIASTOLIC BLOOD PRESSURE: 80 MMHG | TEMPERATURE: 98.4 F | BODY MASS INDEX: 34.31 KG/M2 | WEIGHT: 201 LBS

## 2019-11-26 DIAGNOSIS — J02.9 SORE THROAT: ICD-10-CM

## 2019-11-26 DIAGNOSIS — J02.9 VIRAL PHARYNGITIS: Primary | ICD-10-CM

## 2019-11-26 LAB — S PYO AG THROAT QL: NORMAL

## 2019-11-26 PROCEDURE — G8484 FLU IMMUNIZE NO ADMIN: HCPCS | Performed by: NURSE PRACTITIONER

## 2019-11-26 PROCEDURE — 87651 STREP A DNA AMP PROBE: CPT

## 2019-11-26 PROCEDURE — G8427 DOCREV CUR MEDS BY ELIG CLIN: HCPCS | Performed by: NURSE PRACTITIONER

## 2019-11-26 PROCEDURE — 1036F TOBACCO NON-USER: CPT | Performed by: NURSE PRACTITIONER

## 2019-11-26 PROCEDURE — G8417 CALC BMI ABV UP PARAM F/U: HCPCS | Performed by: NURSE PRACTITIONER

## 2019-11-26 PROCEDURE — 87880 STREP A ASSAY W/OPTIC: CPT | Performed by: NURSE PRACTITIONER

## 2019-11-26 PROCEDURE — 99213 OFFICE O/P EST LOW 20 MIN: CPT | Performed by: NURSE PRACTITIONER

## 2019-11-26 ASSESSMENT — ENCOUNTER SYMPTOMS
TROUBLE SWALLOWING: 0
ABDOMINAL PAIN: 0
COUGH: 0
SHORTNESS OF BREATH: 0
CONSTIPATION: 0
WHEEZING: 0
RHINORRHEA: 0
NAUSEA: 0
SORE THROAT: 1
VISUAL CHANGE: 0
DIARRHEA: 0

## 2019-11-27 ENCOUNTER — TELEPHONE (OUTPATIENT)
Dept: PRIMARY CARE CLINIC | Age: 34
End: 2019-11-27

## 2019-11-27 LAB
DIRECT EXAM: NORMAL
Lab: NORMAL
SPECIMEN DESCRIPTION: NORMAL

## 2020-05-21 ENCOUNTER — TELEPHONE (OUTPATIENT)
Dept: OBGYN | Age: 35
End: 2020-05-21

## 2020-05-21 NOTE — TELEPHONE ENCOUNTER
Patient  Called with questions about starting on progesterone d/t SAB 02/2019. Patient is currently on day 17 of cycle. Per verbal advice of Daron Shankar patient to call when she has a positive pregnancy test. Detailed message left for patient.

## 2020-10-09 ENCOUNTER — TELEPHONE (OUTPATIENT)
Dept: OBGYN | Age: 35
End: 2020-10-09

## 2020-10-12 ENCOUNTER — HOSPITAL ENCOUNTER (OUTPATIENT)
Age: 35
Discharge: HOME | End: 2020-10-12
Payer: COMMERCIAL

## 2020-10-12 LAB — HCG QUANTITATIVE: 368 IU/L

## 2020-10-12 PROCEDURE — 84702 CHORIONIC GONADOTROPIN TEST: CPT

## 2020-10-12 PROCEDURE — 36415 COLL VENOUS BLD VENIPUNCTURE: CPT

## 2020-10-12 NOTE — RESULT ENCOUNTER NOTE
Pt. notified of results and voices understanding. Patient is using Progesterone and baby ASA. Order entered for repeat quant on 10/14/2020. OB care will be scheduled when I call her to discuss repeat quant results.

## 2020-10-14 ENCOUNTER — HOSPITAL ENCOUNTER (OUTPATIENT)
Age: 35
Discharge: HOME | End: 2020-10-14
Payer: COMMERCIAL

## 2020-10-14 LAB — HCG QUANTITATIVE: 810 IU/L

## 2020-10-14 PROCEDURE — 36415 COLL VENOUS BLD VENIPUNCTURE: CPT

## 2020-10-14 PROCEDURE — 84702 CHORIONIC GONADOTROPIN TEST: CPT

## 2020-10-28 ENCOUNTER — TELEPHONE (OUTPATIENT)
Dept: OBGYN | Age: 35
End: 2020-10-28

## 2020-10-28 ENCOUNTER — ROUTINE PRENATAL (OUTPATIENT)
Dept: OBGYN | Age: 35
End: 2020-10-28
Payer: COMMERCIAL

## 2020-10-28 PROCEDURE — 99213 OFFICE O/P EST LOW 20 MIN: CPT | Performed by: ADVANCED PRACTICE MIDWIFE

## 2020-10-28 PROCEDURE — G8484 FLU IMMUNIZE NO ADMIN: HCPCS | Performed by: ADVANCED PRACTICE MIDWIFE

## 2020-10-28 PROCEDURE — 1036F TOBACCO NON-USER: CPT | Performed by: ADVANCED PRACTICE MIDWIFE

## 2020-10-28 PROCEDURE — G8417 CALC BMI ABV UP PARAM F/U: HCPCS | Performed by: ADVANCED PRACTICE MIDWIFE

## 2020-10-28 PROCEDURE — G8428 CUR MEDS NOT DOCUMENT: HCPCS | Performed by: ADVANCED PRACTICE MIDWIFE

## 2020-10-28 NOTE — TELEPHONE ENCOUNTER
Patient previously called with concerns of spotting, she is 6 weeks pregnant. Per Roni Meneses patient needs u/s today. Scheduled for 10/28/2020 @ 11:15. Called and left message for patient.

## 2020-10-28 NOTE — TELEPHONE ENCOUNTER
Patient called and left message, she is 6 weeks pregnant and had spotting when she wiped this am. Advice ?

## 2020-10-30 PROBLEM — Q51.3 BICORNUATE UTERUS: Status: ACTIVE | Noted: 2020-10-30

## 2020-10-30 NOTE — PROGRESS NOTES
DI/DI TWIN GESTATION IN LEFT HORN OF UTERUS     TWIN A  6.4 WK IUP  CL:3.5CM  HR:124BPM  RT. OVARY:NOT VISUALIZED  LT. OVARY:NOT VISUALIZED     TWIN B  6.1WK IUP  HR- 121BPM    Assessment and Plan          Diagnosis Orders   1. Twin pregnancy, dichorionic/diamniotic, first trimester     2. Bicornuate uterus     3. Threatened        Will d/c baby aspirin as spotting        I am having Florecita Campo. Bebo maintain her progesterone. Return in about 2 weeks (around 2020) for ob sono for viability. There are no Patient Instructions on file for this visit. Over 50% of time spent on counseling and care coordination on: see assessment and plan,  She was also counseled on her preventative health maintenance recommendations and follow-up.         FF time: 15 min      Brendan Candelario,10/30/2020 4:15 PM

## 2020-11-02 ENCOUNTER — HOSPITAL ENCOUNTER (OUTPATIENT)
Age: 35
Setting detail: SPECIMEN
Discharge: HOME | End: 2020-11-02
Payer: COMMERCIAL

## 2020-11-02 ENCOUNTER — HOSPITAL ENCOUNTER (OUTPATIENT)
Age: 35
Discharge: HOME | End: 2020-11-02
Payer: COMMERCIAL

## 2020-11-02 ENCOUNTER — INITIAL PRENATAL (OUTPATIENT)
Dept: OBGYN | Age: 35
End: 2020-11-02
Payer: COMMERCIAL

## 2020-11-02 VITALS
WEIGHT: 205 LBS | SYSTOLIC BLOOD PRESSURE: 120 MMHG | DIASTOLIC BLOOD PRESSURE: 74 MMHG | BODY MASS INDEX: 35.19 KG/M2 | HEART RATE: 92 BPM

## 2020-11-02 LAB
ABO/RH: NORMAL
AMPHETAMINE SCREEN URINE: NEGATIVE
ANTIBODY SCREEN: NEGATIVE
BARBITURATE SCREEN URINE: NEGATIVE
BENZODIAZEPINE SCREEN, URINE: NEGATIVE
BUPRENORPHINE URINE: NEGATIVE
CANNABINOID SCREEN URINE: NEGATIVE
COCAINE METABOLITE, URINE: NEGATIVE
HEPATITIS C ANTIBODY: REACTIVE
HIV AG/AB: NONREACTIVE
MDMA URINE: NORMAL
METHADONE SCREEN, URINE: NEGATIVE
METHAMPHETAMINE, URINE: NEGATIVE
OPIATES, URINE: NEGATIVE
OXYCODONE SCREEN URINE: NEGATIVE
PHENCYCLIDINE, URINE: NEGATIVE
PROPOXYPHENE, URINE: NEGATIVE
TEST INFORMATION: NORMAL
TRICYCLIC ANTIDEPRESSANTS, UR: NEGATIVE

## 2020-11-02 PROCEDURE — 86762 RUBELLA ANTIBODY: CPT

## 2020-11-02 PROCEDURE — 36415 COLL VENOUS BLD VENIPUNCTURE: CPT

## 2020-11-02 PROCEDURE — 86900 BLOOD TYPING SEROLOGIC ABO: CPT

## 2020-11-02 PROCEDURE — G8417 CALC BMI ABV UP PARAM F/U: HCPCS | Performed by: ADVANCED PRACTICE MIDWIFE

## 2020-11-02 PROCEDURE — 86850 RBC ANTIBODY SCREEN: CPT

## 2020-11-02 PROCEDURE — 86803 HEPATITIS C AB TEST: CPT

## 2020-11-02 PROCEDURE — 87077 CULTURE AEROBIC IDENTIFY: CPT

## 2020-11-02 PROCEDURE — 87186 SC STD MICRODIL/AGAR DIL: CPT

## 2020-11-02 PROCEDURE — 86901 BLOOD TYPING SEROLOGIC RH(D): CPT

## 2020-11-02 PROCEDURE — 87086 URINE CULTURE/COLONY COUNT: CPT

## 2020-11-02 PROCEDURE — 86780 TREPONEMA PALLIDUM: CPT

## 2020-11-02 PROCEDURE — 99211 OFF/OP EST MAY X REQ PHY/QHP: CPT | Performed by: ADVANCED PRACTICE MIDWIFE

## 2020-11-02 PROCEDURE — 87591 N.GONORRHOEAE DNA AMP PROB: CPT

## 2020-11-02 PROCEDURE — 83036 HEMOGLOBIN GLYCOSYLATED A1C: CPT

## 2020-11-02 PROCEDURE — 90686 IIV4 VACC NO PRSV 0.5 ML IM: CPT | Performed by: ADVANCED PRACTICE MIDWIFE

## 2020-11-02 PROCEDURE — 80306 DRUG TEST PRSMV INSTRMNT: CPT

## 2020-11-02 PROCEDURE — 87491 CHLMYD TRACH DNA AMP PROBE: CPT

## 2020-11-02 PROCEDURE — 87389 HIV-1 AG W/HIV-1&-2 AB AG IA: CPT

## 2020-11-02 PROCEDURE — G8427 DOCREV CUR MEDS BY ELIG CLIN: HCPCS | Performed by: ADVANCED PRACTICE MIDWIFE

## 2020-11-02 PROCEDURE — 87340 HEPATITIS B SURFACE AG IA: CPT

## 2020-11-02 PROCEDURE — 85025 COMPLETE CBC W/AUTO DIFF WBC: CPT

## 2020-11-02 NOTE — PROGRESS NOTES
New OB Visit    Date of service: 2020    Liberty Vasquez  Is a 38 y.o.  female presenting for a New OB visit with Nurse. Name of Father of Tolu Yanez is Art Luna and is involved. Pt does work at [employer]. Pt is not Fertility pt. PT's PCP is: USMAN Gage - BRENDA     : 1985                                             Subjective:       No LMP recorded. Patient is pregnant. OB History    Para Term  AB Living   5 1 1   2 1   SAB TAB Ectopic Molar Multiple Live Births   2                # Outcome Date GA Lbr Luis/2nd Weight Sex Delivery Anes PTL Lv   5 Current            4 Term 10/29/13 39w2d  7 lb 9.2 oz (3.435 kg) M CS-Unspec      3 2012 6w0d          2 2011 6w0d          1                       Social History     Tobacco Use   Smoking Status Never Smoker   Smokeless Tobacco Never Used        Social History     Substance and Sexual Activity   Alcohol Use No        Allergies: Adhesive tape and Sulfa antibiotics      Current Outpatient Medications:     Prenatal Vit-Fe Fumarate-FA (PRENATAL VITAMINS PO), Take by mouth, Disp: , Rfl:     progesterone (ENDOMETRIN) 100 MG vaginal insert, Place 1 tablet vaginally 2 times daily, Disp: 60 each, Rfl: 4      Vital Signs Blood pressure 120/74, pulse 92, weight 205 lb (93 kg), not currently breastfeeding. No results found for this visit on 20. Pain: back pain                            Nausea: yes      Vomiting: yes        Breast enlargement or tenderness: yes    Frequency of urination:yes      Fatigue: yes         Patient history reviewed. Educational materials given and genetic testing reviewed.       Breastfeeding handouts given and reviewed: Yes     If BMI over 35 was HgA1C drawn: yes      If history of thyroid problem was TSH drawn: N/A       My chart set up and activated: Yes    If history of preeclampsia did we start baby ASA: N/A    If history of  delivery - did we set up progesterone injections:  N/A    Does pt have a history of DVT? N/A  If yes start Lovenox 40 mg daily    Is pt allergic to PCN? No:   If yes order U/A to culture and sensitivity if positive. Education:  Patient Instructions       Patient Education        Back Pain During Pregnancy: Care Instructions  Overview     Back pain has many possible causes. It is often caused by problems with muscles and ligaments in your back. The extra weight during pregnancy can put stress on your back. Moving, lifting, standing, sitting, or sleeping in an awkward way also can strain your back. Back pain can also be a sign of labor. Although it may hurt a lot, back pain often improves on its own. Use good home treatment, and take care not to stress your back. Follow-up care is a key part of your treatment and safety. Be sure to make and go to all appointments, and call your doctor if you are having problems. It's also a good idea to know your test results and keep a list of the medicines you take. How can you care for yourself at home? · Ask your doctor about taking acetaminophen (Tylenol) for pain. Do not take aspirin, ibuprofen (Advil, Motrin), or naproxen (Aleve). · Do not take two or more pain medicines at the same time unless the doctor told you to. Many pain medicines have acetaminophen, which is Tylenol. Too much acetaminophen (Tylenol) can be harmful. · Lie on your side with your knees and hips bent and a pillow between your legs. This reduces stress on your back. · Put ice or cold packs on your back for 10 to 20 minutes at a time, several times a day. Put a thin cloth between the ice and your skin. · Warm baths may also help reduce pain. · Change positions every 30 minutes. Take breaks if you must sit for a long time. Get up and walk around. · Ask your doctor about how much exercise you can do. You may feel better taking short walks or doing gentle movements and stretching in a swimming pool.   · Ask your doctor about exercises to stretch and strengthen your back. When should you call for help? Call your doctor now or seek immediate medical care if:    · You think you are in labor.     · You have new numbness in your buttocks, genital or rectal areas, or legs.     · You have a new loss of bowel or bladder control. Watch closely for changes in your health, and be sure to contact your doctor if:    · You do not get better as expected. Where can you learn more? Go to https://5 O'Clock Records.Eyepic. org and sign in to your Virtual Event Bags account. Enter E561 in the Weotta box to learn more about \"Back Pain During Pregnancy: Care Instructions. \"     If you do not have an account, please click on the \"Sign Up Now\" link. Current as of: February 11, 2020               Content Version: 12.6  © 0148-0039 [employer]orporated. Care instructions adapted under license by [facility]. If you have questions about a medical condition or this instruction, always ask your healthcare professional. [place] [address] any warranty or liability for your use of this information. Patient Education        Weeks 6 to 10 of Your Pregnancy: Care Instructions  Your Care Instructions     Congratulations on your pregnancy. This is an exciting and important time for you. During the first 6 to 10 weeks of your pregnancy, your body goes through many changes. Your baby grows very fast, even though you cannot feel it yet. You may start to notice that you feel different, both in your body and your emotions. Because each woman's pregnancy is unique, there is no right way to feel. You may feel the healthiest you have ever been, or you may feel tired or sick to your stomach (\"morning sickness\"). These early weeks are a time to make healthy choices and to eat the best foods for you and your baby. This care sheet will give you some ideas. This is also a good time to think about birth defects testing.  These are tests done during pregnancy to look for possible problems with the baby. First trimester tests for birth defects can be done between 8 and 17 weeks of pregnancy, depending on the test. Talk with your doctor about what kinds of tests are available. Follow-up care is a key part of your treatment and safety. Be sure to make and go to all appointments, and call your doctor if you are having problems. It's also a good idea to know your test results and keep a list of the medicines you take. How can you care for yourself at home? Eat well  · Eat at least 3 meals and 2 healthy snacks every day. Eat fresh, whole foods, including:  ? 7 or more servings of bread, tortillas, cereal, rice, pasta, or oatmeal.  ? 3 or more servings of vegetables, especially leafy green vegetables. ? 2 or more servings of fruits. ? 3 or more servings of milk, yogurt, or cheese. ? 2 or more servings of meat, turkey, chicken, fish, eggs, or dried beans. · Drink plenty of fluids, especially water. Avoid sodas and other sweetened drinks. · Choose foods that have important vitamins for your baby, such as calcium, iron, and folate. ? Dairy products, tofu, canned fish with bones, almonds, broccoli, dark leafy greens, corn tortillas, and fortified orange juice are good sources of calcium. ? Beef, poultry, liver, spinach, lentils, dried beans, fortified cereals, and dried fruits are rich in iron. ? Dark leafy greens, broccoli, asparagus, liver, fortified cereals, orange juice, peanuts, and almonds are good sources of folate. · Avoid foods that could harm your baby. ? Do not eat raw or undercooked meat, chicken, or fish (such as sushi or raw oysters). ? Do not eat raw eggs or foods that contain raw eggs, such as Caesar dressing. ? Do not eat soft cheeses and unpasteurized dairy foods, such as Brie, feta, or blue cheese. ? Do not eat fish that contains a lot of mercury, such as shark, swordfish, tilefish, or sridhar mackerel.  Do not eat more than 6 ounces of tuna each week. ? Do not eat raw sprouts, especially alfalfa sprouts. ? Cut down on caffeine, such as coffee, tea, and cola. Protect yourself and your baby  · Do not touch stoney litter or cat feces. They can cause an infection that could harm your baby. · High body temperature can be harmful to your baby. So if you want to use a sauna or hot tub, be sure to talk to your doctor about how to use it safely. [place] with morning sickness  · Sip small amounts of water, juices, or shakes. Try drinking between meals, not with meals. · Eat 5 or 6 small meals a day. Try dry toast or crackers when you first get up, and eat breakfast a little later. · Avoid spicy, greasy, and fatty foods. · When you feel sick, open your windows or go for a short walk to get fresh air. · Try nausea wristbands. These help some women. · Tell your doctor if you think your prenatal vitamins make you sick. Where can you learn more? Go to https://Oree Advanced Illumination Solutions.Hugo & Debra Natural. org and sign in to your Motion Computing account. Enter G112 in the [facility] box to learn more about \"Weeks 6 to 10 of Your Pregnancy: Care Instructions. \"     If you do not have an account, please click on the \"Sign Up Now\" link. Current as of: February 11, 2020               Content Version: 12.6  © 2414-8478 Nano. Care instructions adapted under license by [facility] ([facility]). If you have questions about a medical condition or this instruction, always ask your healthcare professional. [place] [address] any warranty or liability for your use of this information. Patient Education        Learning About Twin Pregnancy  What is different about a twin pregnancy? In many ways, a twin pregnancy is like a single-baby pregnancy. Healthy twins develop like a single baby does until the last trimester, when their growth slows down. Twins are usually born before the usual 40-week due date.   For the mother, carrying twins can be more difficult than carrying a single baby. And her risks are higher for pregnancy problems. That's why keeping up with prenatal checks and tests is especially important. How do twins grow? Twins develop from embryos to babies like a single baby does. · By weeks 10 to 14 of your pregnancy, one or two placentas have formed inside your uterus. It is possible to hear your babies' heartbeats with a special ultrasound device. Your babies' eyes can move. Their arms and legs can bend. · Around weeks 14 to 18, hair is beginning to grow on your babies' heads. · By 18 to 22 weeks, your babies can now suck their thumbs and  firmly with their hands. They can open and close their eyelids. Around this time, you may start to feel your babies move. At first, this might feel like fluttering or \"butterflies. \"  · Around week 26, you may notice that your babies respond to the sound of your or your partner's voice. You may also notice that they do less turning and twisting and more squirming. · Up to 32 weeks, twins grow rapidly. By this point, they really start to look like babies, with hair and plump skin. · Around 32 to 34 weeks, twins' pace of growth slows down. Their lungs become more ready for breathing. This marks a stage when babies born early are less likely to have breathing problems after birth. What can you expect during a twin pregnancy? With a twin pregnancy, your body makes high levels of pregnancy hormones. So morning sickness may come on earlier and stronger than if you were carrying a single baby. You may also have earlier and more intense symptoms from pregnancy, like swelling, heartburn, leg cramps, bladder discomfort, and sleep problems. Your belly may grow bigger, and you may gain more weight, sooner. Talk to your doctor about how much you might expect to gain. When you're carrying twins, you and your babies may be tested and checked more than you would for a single-baby pregnancy.   · At about 10 weeks, an ultrasound can show if the babies are growing in the same amniotic sac. If they each have their own sac and their own placenta, twins have the best chances of both growing well. · Between weeks 18 and 20, an ultrasound may be able to show the sex of your babies. · Later in your pregnancy, you will start to have checkups more often. This will be sooner than for a single-baby pregnancy. Twins tend to be born early, but 37 weeks is a goal when mother and babies are doing well. As you get closer to delivery time, your medical team will help you know what to expect and what to do. As questions come to mind, keep a list so you can remember to ask your doctor. Follow-up care is a key part of your treatment and safety. Be sure to make and go to all appointments, and call your doctor if you are having problems. It's also a good idea to know your test results and keep a list of the medicines you take. Where can you learn more? Go to https://GFS IT.MaxPreps. org and sign in to your Vessel account. Enter Y952 in the [place] (RTP) box to learn more about \"Learning About Twin Pregnancy. \"     If you do not have an account, please click on the \"Sign Up Now\" link. Current as of: February 11, 2020               Content Version: 12.6  © 0050-3509 Mobi Rider, Incorporated. Care instructions adapted under license by [facility] ([facility]). If you have questions about a medical condition or this instruction, always ask your healthcare professional. [place] [address] any warranty or liability for your use of this information. Patient Education        Learning About Starting to Breastfeed  Planning ahead     Before your baby is born, plan ahead. Learn all you can about breastfeeding. This helps make breastfeeding easier. · Early in your pregnancy, talk to your doctor or midwife about breastfeeding. · Learn the basics before your baby is born.  The staff at hospitals and birthing centers can help you find a lactation specialist. This person is often a nurse who has been trained to teach and advise women about breastfeeding. Or you can take a breastfeeding class. · Plan ahead for times when you will need help after your baby is born. Many women get help from friends and family. Some join a support group to talk to other moms who breastfeed. · Buy the equipment you'll need. Examples are breast pads, nipple cream, extra pillows, and nursing bras. Find out about breast pumps too. Getting help from your hospital or birthing center  It's important to have support from the doctors, nurses, and hospital staff who care for you and your baby. Before it's time for you to give birth, ask about the breastfeeding policies at your hospital or birthing center. Look for a hospital or birthing center that has policies for:  · \"Rooming in. \" This policy encourages you to have your baby in the room with you. It can allow you to breastfeed more often. · Supplemental feedings. Tell the staff that your baby is to get only your breast milk from birth. If staff feed your baby water, sugar solution, or formula right after birth without a medical reason, it may make it harder for you to breastfeed. · Pacifiers or artificial nipples. Staff should not give your  these items. They may interfere with breastfeeding. · Follow-up. Find out if your hospital can help you with breastfeeding issues after you go home. See if you can get information on support groups or other contacts. They might help if you need help setting up and staying with your breastfeeding routine. Your first feeding  It's best to start breastfeeding within 1 hour of birth. For each feeding, you go through these basic steps:  · Get ready for the feeding. Be calm and relaxed, and try not to be distracted. Get some water or juice for yourself. Use two or three pillows to help support your baby while he or she is nursing.   · Find a breastfeeding position that is comfortable for you and your baby. Examples are the cradle and the football positions. Make sure the baby's head and chest are lined up straight and facing your breast. It's best to switch which breast you start with each time. · Get the baby latched on well. Your baby's mouth needs to be wide open, like a yawn, so you may need to gently touch the middle of your baby's lower lip. When your baby's mouth is open wide, quickly bring the baby onto your nipple and areola. The areola is the dark [place] around your nipple. · Provide a complete feeding. Let your baby decide how long to nurse. Be sure to burp your baby after each breast.  In the first days after birth, your breasts make a thick, yellow liquid called colostrum. This liquid gives your baby nutrients and antibodies against infection. It is all that babies need at first. Your breasts will fill with milk a few days after the birth. Talk to your doctor, midwife, or lactation specialist right away if you are having problems and aren't sure what to do. How often to breastfeed  Plan to breastfeed your baby on demand rather than setting a strict schedule. For the first 2 weeks, be prepared to breastfeed at least 8 times in a 24-hour period. In the first few days, you may need to wake a sleeping baby to feed. If you breastfeed more often, it will help your breasts to produce more milk. After you go home  After you're home, don't be afraid to call your doctor, midwife, or lactation specialist with questions. That's true even if you don't know what's bothering you. They are used to parents of newborns calling. They can help you figure out if there is a problem, and if so, how to fix it. Plan for times when you will be apart from your baby. Use a breast pump to collect breast milk ahead of time. You can store milk in the refrigerator or freezer. Then it's ready when someone else will be taking care of your baby.  Experts recommend waiting about a month until breastfeeding is going well before offering a bottle. Breastfeeding is a learned skill that gets easier over time. You are more likely to succeed if you plan ahead, learn the basic techniques, and know where to get help and support. Where can you learn more? Go to https://chpemarycruzeb.healthZazum. org and sign in to your [facility] account. Enter F505 in the Synchro box to learn more about \"Learning About Starting to Breastfeed. \"     If you do not have an account, please click on the \"Sign Up Now\" link. Current as of: February 11, 2020               Content Version: 12.6  © 8377-1979 [employer]US [employer], PeerSpace. Care instructions adapted under license by [facility] ([facility]). If you have questions about a medical condition or this instruction, always ask your healthcare professional. [address] any warranty or liability for your use of this information. Assessment:      Diagnosis Orders   1. Amenorrhea  C.trachomatis N.gonorrhoeae DNA, Urine    Hepatitis C Antibody    HIV Screen    PRENATAL PROFILE I    Prenatal type and screen    Urine Drug Screen, Comprehensive    Culture, Urine    Hemoglobin A1C   2.  Encounter for supervision of other normal pregnancy in first trimester  INFLUENZA, QUADV, 3 YRS AND OLDER, IM PF, PREFILL SYR OR SDV, 0.5ML (AFLURIA QUADV, PF)       Plan: Order Routine Prenatal Lab Yes     Order additional testing if requested         Order Prenatal Vitamins   No: pt taking OTC             Appointment with Franck Arzola CNM in 2 weeks for Dating U/S and total body exam if indicated      Nurse:   Arnulfo Mckinley

## 2020-11-02 NOTE — PATIENT INSTRUCTIONS
rectal areas, or legs.     · You have a new loss of bowel or bladder control. Watch closely for changes in your health, and be sure to contact your doctor if:    · You do not get better as expected. Where can you learn more? Go to https://glenis.healthBetaStudios. org and sign in to your [employer] account. Enter S028 in the Numblebee box to learn more about \"Back Pain During Pregnancy: Care Instructions. \"     If you do not have an account, please click on the \"Sign Up Now\" link. Current as of: February 11, 2020               Content Version: 12.6  © 2218-1746 [employer] Care instructions adapted under license by [facility] ([facility]). If you have questions about a medical condition or this instruction, always ask your healthcare professional. [address] any warranty or liability for your use of this information. Patient Education        Weeks 6 to 10 of Your Pregnancy: Care Instructions  Your Care Instructions     Congratulations on your pregnancy. This is an exciting and important time for you. During the first 6 to 10 weeks of your pregnancy, your body goes through many changes. Your baby grows very fast, even though you cannot feel it yet. You may start to notice that you feel different, both in your body and your emotions. Because each woman's pregnancy is unique, there is no right way to feel. You may feel the healthiest you have ever been, or you may feel tired or sick to your stomach (\"morning sickness\"). These early weeks are a time to make healthy choices and to eat the best foods for you and your baby. This care sheet will give you some ideas. This is also a good time to think about birth defects testing. These are tests done during pregnancy to look for possible problems with the baby.  First trimester tests for birth defects can be done between 8 and 17 weeks of pregnancy, depending on the test. Talk with your doctor about what kinds of tests are available. Follow-up care is a key part of your treatment and safety. Be sure to make and go to all appointments, and call your doctor if you are having problems. It's also a good idea to know your test results and keep a list of the medicines you take. How can you care for yourself at home? Eat well  · Eat at least 3 meals and 2 healthy snacks every day. Eat fresh, whole foods, including:  ? 7 or more servings of bread, tortillas, cereal, rice, pasta, or oatmeal.  ? 3 or more servings of vegetables, especially leafy green vegetables. ? 2 or more servings of fruits. ? 3 or more servings of milk, yogurt, or cheese. ? 2 or more servings of meat, turkey, chicken, fish, eggs, or dried beans. · Drink plenty of fluids, especially water. Avoid sodas and other sweetened drinks. · Choose foods that have important vitamins for your baby, such as calcium, iron, and folate. ? Dairy products, tofu, canned fish with bones, almonds, broccoli, dark leafy greens, corn tortillas, and fortified orange juice are good sources of calcium. ? Beef, poultry, liver, spinach, lentils, dried beans, fortified cereals, and dried fruits are rich in iron. ? Dark leafy greens, broccoli, asparagus, liver, fortified cereals, orange juice, peanuts, and almonds are good sources of folate. · Avoid foods that could harm your baby. ? Do not eat raw or undercooked meat, chicken, or fish (such as sushi or raw oysters). ? Do not eat raw eggs or foods that contain raw eggs, such as Caesar dressing. ? Do not eat soft cheeses and unpasteurized dairy foods, such as Brie, feta, or blue cheese. ? Do not eat fish that contains a lot of mercury, such as shark, swordfish, tilefish, or sridhar mackerel. Do not eat more than 6 ounces of tuna each week. ? Do not eat raw sprouts, especially alfalfa sprouts. ? Cut down on caffeine, such as coffee, tea, and cola. Protect yourself and your baby  · Do not touch stoney litter or cat feces.  They can cause an infection that could harm your baby. · High body temperature can be harmful to your baby. So if you want to use a sauna or hot tub, be sure to talk to your doctor about how to use it safely. [place] with morning sickness  · Sip small amounts of water, juices, or shakes. Try drinking between meals, not with meals. · Eat 5 or 6 small meals a day. Try dry toast or crackers when you first get up, and eat breakfast a little later. · Avoid spicy, greasy, and fatty foods. · When you feel sick, open your windows or go for a short walk to get fresh air. · Try nausea wristbands. These help some women. · Tell your doctor if you think your prenatal vitamins make you sick. Where can you learn more? Go to https://tagUinpePono Pharmaeb.Xceliant. org and sign in to your [employer] account. Enter G112 in the Brand [employer] box to learn more about \"Weeks 6 to 10 of Your Pregnancy: Care Instructions. \"     If you do not have an account, please click on the \"Sign Up Now\" link. Current as of: February 11, 2020               Content Version: 12.6  © 4144-3379 [employer]ef. Care instructions adapted under license by [facility] ([facility]). If you have questions about a medical condition or this instruction, always ask your healthcare professional. [place] [address] any warranty or liability for your use of this information. Patient Education        Learning About Twin Pregnancy  What is different about a twin pregnancy? In many ways, a twin pregnancy is like a single-baby pregnancy. Healthy twins develop like a single baby does until the last trimester, when their growth slows down. Twins are usually born before the usual 40-week due date. For the mother, carrying twins can be more difficult than carrying a single baby. And her risks are higher for pregnancy problems. That's why keeping up with prenatal checks and tests is especially important. How do twins grow?   Twins develop from embryos to babies like a single baby does. · By weeks 10 to 14 of your pregnancy, one or two placentas have formed inside your uterus. It is possible to hear your babies' heartbeats with a special ultrasound device. Your babies' eyes can move. Their arms and legs can bend. · Around weeks 14 to 18, hair is beginning to grow on your babies' heads. · By 18 to 22 weeks, your babies can now suck their thumbs and  firmly with their hands. They can open and close their eyelids. Around this time, you may start to feel your babies move. At first, this might feel like fluttering or \"butterflies. \"  · Around week 26, you may notice that your babies respond to the sound of your or your partner's voice. You may also notice that they do less turning and twisting and more squirming. · Up to 32 weeks, twins grow rapidly. By this point, they really start to look like babies, with hair and plump skin. · Around 32 to 34 weeks, twins' pace of growth slows down. Their lungs become more ready for breathing. This marks a stage when babies born early are less likely to have breathing problems after birth. What can you expect during a twin pregnancy? With a twin pregnancy, your body makes high levels of pregnancy hormones. So morning sickness may come on earlier and stronger than if you were carrying a single baby. You may also have earlier and more intense symptoms from pregnancy, like swelling, heartburn, leg cramps, bladder discomfort, and sleep problems. Your belly may grow bigger, and you may gain more weight, sooner. Talk to your doctor about how much you might expect to gain. When you're carrying twins, you and your babies may be tested and checked more than you would for a single-baby pregnancy. · At about 10 weeks, an ultrasound can show if the babies are growing in the same amniotic sac. If they each have their own sac and their own placenta, twins have the best chances of both growing well.   · Between weeks 18 and 20, an ultrasound may be able to show the sex of your babies. · Later in your pregnancy, you will start to have checkups more often. This will be sooner than for a single-baby pregnancy. Twins tend to be born early, but 37 weeks is a goal when mother and babies are doing well. As you get closer to delivery time, your medical team will help you know what to expect and what to do. As questions come to mind, keep a list so you can remember to ask your doctor. Follow-up care is a key part of your treatment and safety. Be sure to make and go to all appointments, and call your doctor if you are having problems. It's also a good idea to know your test results and keep a list of the medicines you take. Where can you learn more? Go to https://FirmPlay.Neovasc. org and sign in to your DeciZium account. Enter C760 in the Weather Analytics box to learn more about \"Learning About Twin Pregnancy. \"     If you do not have an account, please click on the \"Sign Up Now\" link. Current as of: February 11, 2020               Content Version: 12.6  © 6839-5416 Vision[employer] Technologies[employer] Incorporated. Care instructions adapted under license by [facility] ([facility]). If you have questions about a medical condition or this instruction, always ask your healthcare professional. [address] any warranty or liability for your use of this information. Patient Education        Learning About Starting to Breastfeed  Planning ahead     Before your baby is born, plan ahead. Learn all you can about breastfeeding. This helps make breastfeeding easier. · Early in your pregnancy, talk to your doctor or midwife about breastfeeding. · Learn the basics before your baby is born. The staff at hospitals and birthing centers can help you find a lactation specialist. This person is often a nurse who has been trained to teach and advise women about breastfeeding. Or you can take a breastfeeding class.   · Plan ahead for times when you will need help after your baby is born. Many women get help from friends and family. Some join a support group to talk to other moms who breastfeed. · Buy the equipment you'll need. Examples are breast pads, nipple cream, extra pillows, and nursing bras. Find out about breast pumps too. Getting help from your hospital or birthing center  It's important to have support from the doctors, nurses, and hospital staff who care for you and your baby. Before it's time for you to give birth, ask about the breastfeeding policies at your hospital or birthing center. Look for a hospital or birthing center that has policies for:  · \"Rooming in. \" This policy encourages you to have your baby in the room with you. It can allow you to breastfeed more often. · Supplemental feedings. Tell the staff that your baby is to get only your breast milk from birth. If staff feed your baby water, sugar solution, or formula right after birth without a medical reason, it may make it harder for you to breastfeed. · Pacifiers or artificial nipples. Staff should not give your  these items. They may interfere with breastfeeding. · Follow-up. Find out if your hospital can help you with breastfeeding issues after you go home. See if you can get information on support groups or other contacts. They might help if you need help setting up and staying with your breastfeeding routine. Your first feeding  It's best to start breastfeeding within 1 hour of birth. For each feeding, you go through these basic steps:  · Get ready for the feeding. Be calm and relaxed, and try not to be distracted. Get some water or juice for yourself. Use two or three pillows to help support your baby while he or she is nursing. · Find a breastfeeding position that is comfortable for you and your baby. Examples are the cradle and the football positions.  Make sure the baby's head and chest are lined up straight and facing your breast. It's best to switch which breast you start with each time. · Get the baby latched on well. Your baby's mouth needs to be wide open, like a yawn, so you may need to gently touch the middle of your baby's lower lip. When your baby's mouth is open wide, quickly bring the baby onto your nipple and areola. The areola is the dark [place] around your nipple. · Provide a complete feeding. Let your baby decide how long to nurse. Be sure to burp your baby after each breast.  In the first days after birth, your breasts make a thick, yellow liquid called colostrum. This liquid gives your baby nutrients and antibodies against infection. It is all that babies need at first. Your breasts will fill with milk a few days after the birth. Talk to your doctor, midwife, or lactation specialist right away if you are having problems and aren't sure what to do. How often to breastfeed  Plan to breastfeed your baby on demand rather than setting a strict schedule. For the first 2 weeks, be prepared to breastfeed at least 8 times in a 24-hour period. In the first few days, you may need to wake a sleeping baby to feed. If you breastfeed more often, it will help your breasts to produce more milk. After you go home  After you're home, don't be afraid to call your doctor, midwife, or lactation specialist with questions. That's true even if you don't know what's bothering you. They are used to parents of newborns calling. They can help you figure out if there is a problem, and if so, how to fix it. Plan for times when you will be apart from your baby. Use a breast pump to collect breast milk ahead of time. You can store milk in the refrigerator or freezer. Then it's ready when someone else will be taking care of your baby. Experts recommend waiting about a month until breastfeeding is going well before offering a bottle. Breastfeeding is a learned skill that gets easier over time.  You are more likely to succeed if you plan ahead, learn the basic techniques, and know where to get help and support. Where can you learn more? Go to https://chpepiceweb.healthAito Technologies. org and sign in to your StartBull account. Enter C714 in the Funtigo Corporation box to learn more about \"Learning About Starting to Breastfeed. \"     If you do not have an account, please click on the \"Sign Up Now\" link. Current as of: February 11, 2020               Content Version: 12.6  © 2006-2020 Therosteon, Incorporated. Care instructions adapted under license by [facility] ([facility]). If you have questions about a medical condition or this instruction, always ask your healthcare professional. [address] any warranty or liability for your use of this information.

## 2020-11-03 LAB
ABSOLUTE EOS #: 0.06 K/UL (ref 0–0.44)
ABSOLUTE IMMATURE GRANULOCYTE: 0.11 K/UL (ref 0–0.3)
ABSOLUTE LYMPH #: 1.83 K/UL (ref 1.1–3.7)
ABSOLUTE MONO #: 0.68 K/UL (ref 0.1–1.2)
BASOPHILS # BLD: 0 % (ref 0–2)
BASOPHILS ABSOLUTE: 0.06 K/UL (ref 0–0.2)
C. TRACHOMATIS DNA ,URINE: NEGATIVE
DIFFERENTIAL TYPE: ABNORMAL
EOSINOPHILS RELATIVE PERCENT: 0 % (ref 1–4)
ESTIMATED AVERAGE GLUCOSE: 131 MG/DL
HBA1C MFR BLD: 6.2 % (ref 4–6)
HCT VFR BLD CALC: 39.8 % (ref 36.3–47.1)
HEMOGLOBIN: 12.7 G/DL (ref 11.9–15.1)
HEPATITIS B SURFACE ANTIGEN: NONREACTIVE
IMMATURE GRANULOCYTES: 1 %
LYMPHOCYTES # BLD: 13 % (ref 24–43)
MCH RBC QN AUTO: 29.5 PG (ref 25.2–33.5)
MCHC RBC AUTO-ENTMCNC: 31.9 G/DL (ref 28.4–34.8)
MCV RBC AUTO: 92.6 FL (ref 82.6–102.9)
MONOCYTES # BLD: 5 % (ref 3–12)
N. GONORRHOEAE DNA, URINE: NEGATIVE
NRBC AUTOMATED: 0 PER 100 WBC
PDW BLD-RTO: 13 % (ref 11.8–14.4)
PLATELET # BLD: 287 K/UL (ref 138–453)
PLATELET ESTIMATE: ABNORMAL
PMV BLD AUTO: 10.9 FL (ref 8.1–13.5)
RBC # BLD: 4.3 M/UL (ref 3.95–5.11)
RBC # BLD: ABNORMAL 10*6/UL
RUBV IGG SER QL: 54.6 IU/ML
SEG NEUTROPHILS: 81 % (ref 36–65)
SEGMENTED NEUTROPHILS ABSOLUTE COUNT: 11.33 K/UL (ref 1.5–8.1)
SPECIMEN DESCRIPTION: NORMAL
T. PALLIDUM, IGG: NONREACTIVE
WBC # BLD: 14.1 K/UL (ref 3.5–11.3)
WBC # BLD: ABNORMAL 10*3/UL

## 2020-11-04 LAB
CULTURE: ABNORMAL
Lab: ABNORMAL
SPECIMEN DESCRIPTION: ABNORMAL

## 2020-11-05 ENCOUNTER — HOSPITAL ENCOUNTER (OUTPATIENT)
Age: 35
Discharge: HOME | End: 2020-11-05
Payer: COMMERCIAL

## 2020-11-05 LAB
AMOUNT GLUCOSE GIVEN: ABNORMAL G
GLUCOSE BLD-MCNC: 109 MG/DL (ref 74–100)
GLUCOSE FASTING: 114 MG/DL (ref 65–99)
GLUCOSE TOLERANCE TEST 1 HOUR: 272 MG/DL (ref 65–184)
GLUCOSE TOLERANCE TEST 2 HOUR: 247 MG/DL (ref 65–139)

## 2020-11-05 PROCEDURE — 87902 NFCT AGT GNTYP ALYS HEP C: CPT

## 2020-11-05 PROCEDURE — 36415 COLL VENOUS BLD VENIPUNCTURE: CPT

## 2020-11-05 PROCEDURE — 87522 HEPATITIS C REVRS TRNSCRPJ: CPT

## 2020-11-05 PROCEDURE — 82951 GLUCOSE TOLERANCE TEST (GTT): CPT

## 2020-11-05 PROCEDURE — 82947 ASSAY GLUCOSE BLOOD QUANT: CPT

## 2020-11-05 RX ORDER — AMOXICILLIN 500 MG/1
500 CAPSULE ORAL 3 TIMES DAILY
Qty: 21 CAPSULE | Refills: 0 | Status: SHIPPED | OUTPATIENT
Start: 2020-11-05 | End: 2020-11-12

## 2020-11-05 NOTE — RESULT ENCOUNTER NOTE
Pt. notified of results and voices understanding. Orders entered for diabetes education, glucometer, testing supplies and referral to Dr. Lucy Barrientos. Patient aware diabetes eduction and Dr. Suzi Weiss office will contact her with appointment.

## 2020-11-06 ENCOUNTER — HOSPITAL ENCOUNTER (OUTPATIENT)
Dept: NUTRITION | Age: 35
Discharge: HOME | End: 2020-11-06
Payer: COMMERCIAL

## 2020-11-06 VITALS — HEIGHT: 64 IN | BODY MASS INDEX: 34.85 KG/M2 | WEIGHT: 204.13 LBS

## 2020-11-06 PROCEDURE — 97802 MEDICAL NUTRITION INDIV IN: CPT

## 2020-11-06 NOTE — PROGRESS NOTES
grams  Bedtime Snack: 15 grams    Client received information on limiting fat in diet to lessen undesired weight gains in pregnancy, with goals of: Total Fat: 100 grams  Saturated Fat: 20 grams  Trans Fat: 0 grams daily     Discussed and provided literature on:  Carbohydrate counting, utilizing materials: Choose Your Foods book for meal planning, Food Label, MyPlate, sample eating pattern, pregnancy nutrition guidelines, and individual carbohydrate counts (as noted above). Reinforced importance of measuring portions and reading food labels for Total Carbohydrate and Serving Sizes. Discussed importance of consistent meal timing and carbohydrate intakes throughout day. Reinforced elimination of juices, regular pop and very cautious use of dessert items. Demonstrated portion sizes for food items, reinforcing mealtime variety for all food groups. Encouraged client to look up nutrition information regarding fast food choices on internet or smartphone. Suggested more meal planning and packing foods for work day. Revisited Listeria risks with cold luncheon meats. Discussed using smaller cans of gingerale (7.5 oz can = 23g carbohydrate) to reduce the amount of total carbohydrate and sip on it to aid nausea prevention/reduction, but reminded Katharine Bonnet it could increase glucose level due to sugar content.      Client goals:   Measure food portions    Read food labels for Total Carbohydrate and Serving Size    Keep consistent meal timing for all meals and snacks    Avoid excess fat, saturated fat and trans fats    Use MyPlate as template for nutrition balance    Use sample menu to guide healthy meal choices    Look up nutrition facts for fast foods on internet    Only choose sugar free drinks (avoid pop, sweet tea, juice and chocolate milk)    Look up restaurant and fast food information via computer or smartphone    Continue to avoid Listeria prone products    Expected compliance:  good  Client appears to be in a contemplative phase of change. Recommend follow up as needed. RD name and phone number provided. Thank you for the referral.    Electronically signed by Trino Beavers RD, ANAND on 11/6/2020 at 2:35 PM    Education session duration: 45 minutes; (1:45-2:30pm ). Reminder to ordering Physician/Provider:  Diabetes and CKD (non-dialysis) patients may have 2 hours of MNT education in subsequent years. Hours can be spread over any number of visits.

## 2020-11-09 ENCOUNTER — HOSPITAL ENCOUNTER (OUTPATIENT)
Dept: DIABETES SERVICES | Age: 35
Setting detail: THERAPIES SERIES
Discharge: HOME | End: 2020-11-09
Payer: COMMERCIAL

## 2020-11-09 LAB
HCV QUANTITATIVE: NORMAL
HEPATITIS C GENOTYPE: NORMAL

## 2020-11-09 PROCEDURE — G0108 DIAB MANAGE TRN  PER INDIV: HCPCS

## 2020-11-09 NOTE — PROGRESS NOTES
Patient seen for diabetes education. She is in her first trimester with twins. Her A1C was checked and was 6.2 and failed 2 hour glucose tolerance test.  She has also been referred to Dr. Luis Contreras, Endocrinologist.  Her grandmother has Type 2 diabetes but no other family history. No previous elevated glucose and did not have gestational with other pregnancy. She has had several miscarriages and had some bleeding at the beginning of this pregnancy. Discuss prediabetes and gestational diabetes. Explained her A1C results and inform of normal range. She does report weight gain during COVID-19 quarantine and admits to drinking \"too much regular soda. \"  We discuss that diabetes is not caused by an excess intake of sugar. Explain insulin resistance, prediabetes, and the role of insulin in the body. Inform of the increased risk of developing Type 2 diabetes with gestational and prediabetes. Discuss the importance of being tested 6-8 weeks post partum to determine diagnosis. Encourage weight loss post partum and to aim for 7-10% weight loss. Currently she is not exercising and encourage her to discuss safe exercises with OB provider due to previous miscarriages and bleeding this pregnancy. If she is not able to exercise during pregnancy she is instructed to exercise after pregnancy and to aim for at least 30 minutes 5 days a week. Instructed on monitor use. Verbally and visually demonstrated use of meter with patient able to perform a test independently without difficulty. Her result at 3:30 pm is 97. She had a snack at 2 pm which included pretzels and vegetables. Instructed to check glucose fasting and one hour post prandial after each meal as ordered. Target range of fasting <95 and one hour post prandial of <130 given. Instructed to record results and take to next appointment. Safe needle lancet and disposal handout given and reviewed.     Discuss the potential complications of gestational diabetes on her as well as baby. Signs, symptoms, treatment, and causes of hypoglycemia handout reviewed. All questions answered and contact information given and instructed to call with questions or concerns. Susana Flower is a 38 y.o. female being evaluated by a Virtual Visit (video visit) encounter to address concerns as mentioned above. A caregiver was present when appropriate. Due to this being a TeleHealth encounter (During ECXAO-33 public health emergency), evaluation of the following organ systems was limited: Vitals/Constitutional/EENT/Resp/CV/GI//MS/Neuro/Skin/Heme-Lymph-Imm. Pursuant to the emergency declaration under the [address] [place] [address], [address] [place] [address] authority and the [employer] Act, this Virtual Visit was conducted with patient's (and/or legal guardian's) consent, to reduce the patient's risk of exposure to COVID-19 and provide necessary medical care. The patient (and/or legal guardian) has also been advised to contact this office for worsening conditions or problems, and seek emergency medical treatment and/or call 911 if deemed necessary. Patient identification was verified at the start of the visit: Yes    Total time spent for this encounter: 45 minutes    Services were provided through a video synchronous discussion virtually to substitute for in-person clinic visit. Patient and provider were located at their individual homes. --Dilma Davis RN on 11/9/2020 at 7:25 PM    An electronic signature was used to authenticate this note.

## 2020-11-11 ENCOUNTER — ROUTINE PRENATAL (OUTPATIENT)
Dept: OBGYN | Age: 35
End: 2020-11-11
Payer: COMMERCIAL

## 2020-11-11 VITALS — BODY MASS INDEX: 34.74 KG/M2 | SYSTOLIC BLOOD PRESSURE: 118 MMHG | DIASTOLIC BLOOD PRESSURE: 72 MMHG | WEIGHT: 202.4 LBS

## 2020-11-11 PROCEDURE — 0502F SUBSEQUENT PRENATAL CARE: CPT | Performed by: ADVANCED PRACTICE MIDWIFE

## 2020-11-11 ASSESSMENT — PATIENT HEALTH QUESTIONNAIRE - PHQ9
SUM OF ALL RESPONSES TO PHQ QUESTIONS 1-9: 0
SUM OF ALL RESPONSES TO PHQ9 QUESTIONS 1 & 2: 0
SUM OF ALL RESPONSES TO PHQ QUESTIONS 1-9: 0
SUM OF ALL RESPONSES TO PHQ QUESTIONS 1-9: 0
1. LITTLE INTEREST OR PLEASURE IN DOING THINGS: 0
2. FEELING DOWN, DEPRESSED OR HOPELESS: 0

## 2020-11-12 LAB
DIRECT EXAM: NORMAL
Lab: NORMAL
SPECIMEN DESCRIPTION: NORMAL

## 2020-11-16 ENCOUNTER — TELEPHONE (OUTPATIENT)
Dept: OBGYN | Age: 35
End: 2020-11-16

## 2020-11-16 RX ORDER — METOCLOPRAMIDE 10 MG/1
10 TABLET ORAL
Qty: 30 TABLET | Refills: 3 | Status: ON HOLD | OUTPATIENT
Start: 2020-11-16 | End: 2021-05-20 | Stop reason: HOSPADM

## 2020-11-16 RX ORDER — ONDANSETRON 4 MG/1
4 TABLET, FILM COATED ORAL 3 TIMES DAILY PRN
Qty: 30 TABLET | Refills: 1 | Status: SHIPPED | OUTPATIENT
Start: 2020-11-16 | End: 2021-01-05

## 2020-11-16 NOTE — TELEPHONE ENCOUNTER
Patient [facility] [employer] message asking about prescription for Zofran and something else that you had discussed? I do not see where anything was sent.  [facility]

## 2020-11-18 ENCOUNTER — HOSPITAL ENCOUNTER (OUTPATIENT)
Age: 35
Discharge: HOME | End: 2020-11-18
Payer: COMMERCIAL

## 2020-11-18 LAB
-: NORMAL
AMORPHOUS: NORMAL
BACTERIA: NORMAL
CASTS UA: NORMAL /LPF
CRYSTALS, UA: NORMAL /HPF
EPITHELIAL CELLS UA: NORMAL /HPF (ref 0–25)
MUCUS: NORMAL
OTHER OBSERVATIONS UA: NORMAL
RBC UA: NORMAL /HPF (ref 0–2)
RENAL EPITHELIAL, UA: NORMAL /HPF
TRICHOMONAS: NORMAL
WBC UA: NORMAL /HPF (ref 0–5)
YEAST: NORMAL

## 2020-11-18 PROCEDURE — 87186 SC STD MICRODIL/AGAR DIL: CPT

## 2020-11-18 PROCEDURE — 87088 URINE BACTERIA CULTURE: CPT

## 2020-11-18 PROCEDURE — 87086 URINE CULTURE/COLONY COUNT: CPT

## 2020-11-18 PROCEDURE — 81015 MICROSCOPIC EXAM OF URINE: CPT

## 2020-11-20 LAB
CULTURE: ABNORMAL
Lab: ABNORMAL
SPECIMEN DESCRIPTION: ABNORMAL

## 2020-11-20 RX ORDER — CEPHALEXIN 500 MG/1
500 CAPSULE ORAL 3 TIMES DAILY
Qty: 21 CAPSULE | Refills: 0 | Status: SHIPPED | OUTPATIENT
Start: 2020-11-20 | End: 2021-01-05 | Stop reason: ALTCHOICE

## 2020-11-23 ENCOUNTER — ROUTINE PRENATAL (OUTPATIENT)
Dept: OBGYN | Age: 35
End: 2020-11-23
Payer: COMMERCIAL

## 2020-11-23 ENCOUNTER — HOSPITAL ENCOUNTER (OUTPATIENT)
Dept: ULTRASOUND IMAGING | Age: 35
Discharge: HOME | End: 2020-11-25
Payer: COMMERCIAL

## 2020-11-23 VITALS — SYSTOLIC BLOOD PRESSURE: 120 MMHG | DIASTOLIC BLOOD PRESSURE: 74 MMHG | BODY MASS INDEX: 34.23 KG/M2 | WEIGHT: 199.4 LBS

## 2020-11-23 PROCEDURE — 0502F SUBSEQUENT PRENATAL CARE: CPT | Performed by: ADVANCED PRACTICE MIDWIFE

## 2020-11-23 PROCEDURE — 76815 OB US LIMITED FETUS(S): CPT

## 2020-11-23 RX ORDER — BLOOD-GLUCOSE METER
EACH MISCELLANEOUS
Status: ON HOLD | COMMUNITY
Start: 2020-11-06 | End: 2021-05-20 | Stop reason: HOSPADM

## 2020-11-23 RX ORDER — ONDANSETRON 4 MG/1
4 TABLET, FILM COATED ORAL EVERY 8 HOURS PRN
Qty: 30 TABLET | Refills: 1 | Status: SHIPPED | OUTPATIENT
Start: 2020-11-23 | End: 2021-01-05

## 2020-11-23 RX ORDER — LANCETS
EACH MISCELLANEOUS
Status: ON HOLD | COMMUNITY
Start: 2020-11-06 | End: 2021-05-20 | Stop reason: HOSPADM

## 2020-11-23 RX ORDER — ONDANSETRON 4 MG/1
TABLET, ORALLY DISINTEGRATING ORAL
COMMUNITY
End: 2021-01-05 | Stop reason: ALTCHOICE

## 2020-11-24 NOTE — PROGRESS NOTES
Antione Mahoney is here at [place] [id number] for:    Chief Complaint   Patient presents with    Routine Prenatal Visit     Follow up hospital. ultrasound. Estimated Due Date: Estimated Date of Delivery: 21    OB History    Para Term  AB Living   5 1 1   2 1   SAB TAB Ectopic Molar Multiple Live Births   2                # Outcome Date GA Lbr Luis/2nd Weight Sex Delivery Anes PTL Lv   5 Current            4 Term 10/29/13 39w2d  7 lb 9.2 oz (3.435 kg) M CS-Unspec      3 2012 6w0d          2 2011 6w0d          1                  Past Medical History:   Diagnosis Date    Kidney calculi        Past Surgical History:   Procedure Laterality Date     SECTION  10/29/2013    CHOLECYSTECTOMY      DILATION AND CURETTAGE OF UTERUS      DILATION AND CURETTAGE OF UTERUS N/A 2019    DILATATION AND CURETTAGE SUCTION performed by Valencia Westfall MD at [address] [place] [address] History     Tobacco Use   Smoking Status Never Smoker   Smokeless Tobacco Never Used        Social History     Substance and Sexual Activity   Alcohol Use No       No results found for this visit on 20. Vitals:  /74   Wt 199 lb 6.4 oz (90.4 kg)   BMI 34.23 kg/m²   Estimated body mass index is 34.23 kg/m² as calculated from the following:    Height as of 20: 5' 4\" (1.626 m). Weight as of this encounter: 199 lb 6.4 oz (90.4 kg). HPI: here for continued viability sono and f/u to sugars, still high, has seen Dr. Suha Oliver who gave her two different insulin pens but has not had her start yet, until more sugars are called in. Also f/u hepatitis c testing came back without idenitifable genotype and pcr titers not elevated. Likely given low risk factors an initial false positive possibly secondary to pregnancy. In on antibiotic for UTI and feeling \"better\" with dysuria symptoms. sono with viable di/di pregnancy x2 reported per patient, result not yet available.      PT denies fever, chills, nausea and vomiting       Abdomen: enlarged, gravid, soft, nontender         Results reviewed today:    No results found for this visit on 11/23/20. See prenatal vital sign section and fetal assessment section    ASSESSMENT & Plan    Diagnosis Orders   1. Dichorionic diamniotic twin pregnancy in first trimester     2. Nausea and vomiting during pregnancy  ondansetron (ZOFRAN) 4 MG tablet   3. 10 weeks gestation of pregnancy         continue baby asa and progesterone suppositories for RPL. Continue f/u with endocrinology. I am having Linelidia [place]. Bebo start on ondansetron. I am also having her maintain her progesterone, Prenatal Vit-Fe Fumarate-FA (PRENATAL VITAMINS PO), blood glucose monitor supplies, ondansetron, metoclopramide, cephALEXin, insulin glargine, Accu-Chek Softclix Lancets, ondansetron, and Accu-Chek Dalia Plus. Return in about 2 weeks (around 12/7/2020) for ob tbe. There are no Patient Instructions on file for this visit.           Nia Candelario,11/24/2020 11:48 AM

## 2020-11-25 ENCOUNTER — HOSPITAL ENCOUNTER (OUTPATIENT)
Age: 35
Discharge: HOME | End: 2020-11-25
Payer: COMMERCIAL

## 2020-11-25 LAB
ABSOLUTE EOS #: 0.06 K/UL (ref 0–0.44)
ABSOLUTE IMMATURE GRANULOCYTE: 0.05 K/UL (ref 0–0.3)
ABSOLUTE LYMPH #: 1.37 K/UL (ref 1.1–3.7)
ABSOLUTE MONO #: 0.72 K/UL (ref 0.1–1.2)
ALBUMIN SERPL-MCNC: 4.1 G/DL (ref 3.5–5.2)
ALBUMIN/GLOBULIN RATIO: 1.1 (ref 1–2.5)
ALP BLD-CCNC: 68 U/L (ref 35–104)
ALT SERPL-CCNC: 34 U/L (ref 5–33)
ANION GAP SERPL CALCULATED.3IONS-SCNC: 14 MMOL/L (ref 9–17)
AST SERPL-CCNC: 32 U/L
BASOPHILS # BLD: 0 % (ref 0–2)
BASOPHILS ABSOLUTE: 0.04 K/UL (ref 0–0.2)
BILIRUB SERPL-MCNC: 0.59 MG/DL (ref 0.3–1.2)
BUN BLDV-MCNC: 10 MG/DL (ref 6–20)
BUN/CREAT BLD: 16 (ref 9–20)
CALCIUM SERPL-MCNC: 9.8 MG/DL (ref 8.6–10.4)
CHLORIDE BLD-SCNC: 98 MMOL/L (ref 98–107)
CHOLESTEROL/HDL RATIO: 2.5
CHOLESTEROL: 117 MG/DL
CO2: 22 MMOL/L (ref 20–31)
CREAT SERPL-MCNC: 0.61 MG/DL (ref 0.5–0.9)
DIFFERENTIAL TYPE: ABNORMAL
EOSINOPHILS RELATIVE PERCENT: 1 % (ref 1–4)
FOLATE: >20 NG/ML
GFR AFRICAN AMERICAN: >60 ML/MIN
GFR NON-AFRICAN AMERICAN: >60 ML/MIN
GFR SERPL CREATININE-BSD FRML MDRD: ABNORMAL ML/MIN/{1.73_M2}
GFR SERPL CREATININE-BSD FRML MDRD: ABNORMAL ML/MIN/{1.73_M2}
GLUCOSE BLD-MCNC: 101 MG/DL (ref 70–99)
HCT VFR BLD CALC: 41.2 % (ref 36.3–47.1)
HDLC SERPL-MCNC: 47 MG/DL
HEMOGLOBIN: 13.4 G/DL (ref 11.9–15.1)
IMMATURE GRANULOCYTES: 0 %
IRON: 113 UG/DL (ref 37–145)
LDL CHOLESTEROL: 42 MG/DL (ref 0–130)
LYMPHOCYTES # BLD: 12 % (ref 24–43)
MCH RBC QN AUTO: 30.8 PG (ref 25.2–33.5)
MCHC RBC AUTO-ENTMCNC: 32.5 G/DL (ref 28.4–34.8)
MCV RBC AUTO: 94.7 FL (ref 82.6–102.9)
MONOCYTES # BLD: 6 % (ref 3–12)
NRBC AUTOMATED: 0 PER 100 WBC
PDW BLD-RTO: 13.7 % (ref 11.8–14.4)
PLATELET # BLD: 292 K/UL (ref 138–453)
PLATELET ESTIMATE: ABNORMAL
PMV BLD AUTO: 11 FL (ref 8.1–13.5)
POTASSIUM SERPL-SCNC: 3.8 MMOL/L (ref 3.7–5.3)
RBC # BLD: 4.35 M/UL (ref 3.95–5.11)
RBC # BLD: ABNORMAL 10*6/UL
SEG NEUTROPHILS: 81 % (ref 36–65)
SEGMENTED NEUTROPHILS ABSOLUTE COUNT: 9.31 K/UL (ref 1.5–8.1)
SODIUM BLD-SCNC: 134 MMOL/L (ref 135–144)
TOTAL PROTEIN: 7.7 G/DL (ref 6.4–8.3)
TRIGL SERPL-MCNC: 140 MG/DL
TSH SERPL DL<=0.05 MIU/L-ACNC: 0.65 MIU/L (ref 0.3–5)
VITAMIN B-12: 606 PG/ML (ref 232–1245)
VITAMIN D 25-HYDROXY: 23.8 NG/ML (ref 30–100)
VLDLC SERPL CALC-MCNC: NORMAL MG/DL (ref 1–30)
WBC # BLD: 11.6 K/UL (ref 3.5–11.3)
WBC # BLD: ABNORMAL 10*3/UL

## 2020-11-25 PROCEDURE — 80061 LIPID PANEL: CPT

## 2020-11-25 PROCEDURE — 83540 ASSAY OF IRON: CPT

## 2020-11-25 PROCEDURE — 85025 COMPLETE CBC W/AUTO DIFF WBC: CPT

## 2020-11-25 PROCEDURE — 84443 ASSAY THYROID STIM HORMONE: CPT

## 2020-11-25 PROCEDURE — 80053 COMPREHEN METABOLIC PANEL: CPT

## 2020-11-25 PROCEDURE — 36415 COLL VENOUS BLD VENIPUNCTURE: CPT

## 2020-11-25 PROCEDURE — 82607 VITAMIN B-12: CPT

## 2020-11-25 PROCEDURE — 82306 VITAMIN D 25 HYDROXY: CPT

## 2020-11-25 PROCEDURE — 82746 ASSAY OF FOLIC ACID SERUM: CPT

## 2020-11-30 ENCOUNTER — TELEPHONE (OUTPATIENT)
Dept: OBGYN | Age: 35
End: 2020-11-30

## 2020-11-30 ENCOUNTER — HOSPITAL ENCOUNTER (OUTPATIENT)
Age: 35
Discharge: HOME | End: 2020-11-30
Payer: COMMERCIAL

## 2020-11-30 NOTE — TELEPHONE ENCOUNTER
Patient calls with concerns of spotting  Dark brown today. Patient transferred to scheduling to schedule u/s for this week and advised to take it easy . Patient voiced understanding.

## 2020-12-01 ENCOUNTER — ANCILLARY PROCEDURE (OUTPATIENT)
Dept: OBGYN | Age: 35
End: 2020-12-01
Payer: COMMERCIAL

## 2020-12-01 ENCOUNTER — HOSPITAL ENCOUNTER (OUTPATIENT)
Dept: LAB | Age: 35
Discharge: HOME | End: 2020-12-01
Payer: COMMERCIAL

## 2020-12-01 ENCOUNTER — ROUTINE PRENATAL (OUTPATIENT)
Dept: OBGYN | Age: 35
End: 2020-12-01
Payer: COMMERCIAL

## 2020-12-01 VITALS — BODY MASS INDEX: 35.19 KG/M2 | SYSTOLIC BLOOD PRESSURE: 136 MMHG | WEIGHT: 205 LBS | DIASTOLIC BLOOD PRESSURE: 82 MMHG

## 2020-12-01 LAB
GLUCOSE ADMINISTRATION: ABNORMAL
GLUCOSE TOLERANCE SCREEN 50G: 169 MG/DL (ref 70–135)

## 2020-12-01 PROCEDURE — 76815 OB US LIMITED FETUS(S): CPT | Performed by: OBSTETRICS & GYNECOLOGY

## 2020-12-01 PROCEDURE — 82950 GLUCOSE TEST: CPT

## 2020-12-01 PROCEDURE — 82985 ASSAY OF GLYCATED PROTEIN: CPT

## 2020-12-01 PROCEDURE — 0502F SUBSEQUENT PRENATAL CARE: CPT | Performed by: ADVANCED PRACTICE MIDWIFE

## 2020-12-01 PROCEDURE — 36415 COLL VENOUS BLD VENIPUNCTURE: CPT

## 2020-12-01 NOTE — PROGRESS NOTES
Welby Scheuermann is here at [address] for:    Chief Complaint   Patient presents with    Routine Prenatal Visit     Follow up in house ultrasound, patient c/o brown vaginal dischaarge. Estimated Due Date: Estimated Date of Delivery: 21    OB History    Para Term  AB Living   5 1 1   2 1   SAB TAB Ectopic Molar Multiple Live Births   2                # Outcome Date GA Lbr Luis/2nd Weight Sex Delivery Anes PTL Lv   5 Current            4 Term 10/29/13 39w2d  7 lb 9.2 oz (3.435 kg) M CS-Unspec      3 2012 6w0d          2 2011 6w0d          1                  Past Medical History:   Diagnosis Date    Kidney calculi        Past Surgical History:   Procedure Laterality Date     SECTION  10/29/2013    CHOLECYSTECTOMY      DILATION AND CURETTAGE OF UTERUS      DILATION AND CURETTAGE OF UTERUS N/A 2019    DILATATION AND CURETTAGE SUCTION performed by Mony Vásquez MD at [address] [place] [address] History     Tobacco Use   Smoking Status Never Smoker   Smokeless Tobacco Never Used        Social History     Substance and Sexual Activity   Alcohol Use No       No results found for this visit on 20. Vitals:  /82   Wt 205 lb (93 kg)   BMI 35.19 kg/m²   Estimated body mass index is 35.19 kg/m² as calculated from the following:    Height as of 20: 5' 4\" (1.626 m). Weight as of this encounter: 205 lb (93 kg). HPI: here for viability sono with vaginal bleeding, FHR x2, no evidence of [facility]     PT denies fever, chills, nausea and vomiting       Abdomen: enlarged, gravid, soft, nontender         Results reviewed today:  Sono:  TWIN GEST DI/DI - LT HORN OF UTERUS      TWIN A  11.2 WK IUP  HR:178BPM  ACTIVE FETAL MOVEMENTS     TWIN B  11.2 WK IUP  HR:167BPM  ACTIVE FETAL MOVEMENTS     CVX LENGTH- 4.4CM  No results found for this visit on 20.     See prenatal vital sign section and fetal assessment section    ASSESSMENT & Plan    Diagnosis Orders   1. 11 weeks gestation of pregnancy     2. Vaginal bleeding in pregnancy, first trimester               I am having Deja Mendez. Bebo maintain her progesterone, Prenatal Vit-Fe Fumarate-FA (PRENATAL VITAMINS PO), blood glucose monitor supplies, ondansetron, metoclopramide, cephALEXin, insulin glargine, Accu-Chek Softclix Lancets, ondansetron, Accu-Chek Dalia Plus, ondansetron, and (Insulin Aspart, w/Niacinamide, (FIASP SC)). Return keep appointment next week. There are no Patient Instructions on file for this visit.           Jossie Candelario,12/1/2020 2:40 PM

## 2020-12-03 LAB — FRUCTOSAMINE: 242 UMOL/L (ref 170–285)

## 2020-12-07 ENCOUNTER — HOSPITAL ENCOUNTER (OUTPATIENT)
Age: 35
Setting detail: SPECIMEN
Discharge: HOME | End: 2020-12-07
Payer: COMMERCIAL

## 2020-12-07 ENCOUNTER — ROUTINE PRENATAL (OUTPATIENT)
Dept: OBGYN | Age: 35
End: 2020-12-07
Payer: COMMERCIAL

## 2020-12-07 VITALS — SYSTOLIC BLOOD PRESSURE: 128 MMHG | DIASTOLIC BLOOD PRESSURE: 76 MMHG | BODY MASS INDEX: 33.64 KG/M2 | WEIGHT: 196 LBS

## 2020-12-07 PROCEDURE — 0501F PRENATAL FLOW SHEET: CPT | Performed by: ADVANCED PRACTICE MIDWIFE

## 2020-12-07 PROCEDURE — 87086 URINE CULTURE/COLONY COUNT: CPT

## 2020-12-07 PROCEDURE — 87088 URINE BACTERIA CULTURE: CPT

## 2020-12-07 PROCEDURE — 87070 CULTURE OTHR SPECIMN AEROBIC: CPT

## 2020-12-07 PROCEDURE — 87186 SC STD MICRODIL/AGAR DIL: CPT

## 2020-12-07 NOTE — PROGRESS NOTES
Graciela Mccartney is here at 12w2d for:    Chief Complaint   Patient presents with    Routine Prenatal Visit     TBE, last pap 2018 negative. Estimated Due Date: Estimated Date of Delivery: 21    OB History    Para Term  AB Living   5 1 1   2 1   SAB TAB Ectopic Molar Multiple Live Births   2                # Outcome Date GA Lbr Luis/2nd Weight Sex Delivery Anes PTL Lv   5 Current            4 Term 10/29/13 39w2d  7 lb 9.2 oz (3.435 kg) M CS-Unspec      3 2012 6w0d          2 SAB  6w0d          1                  Past Medical History:   Diagnosis Date    Kidney calculi        Past Surgical History:   Procedure Laterality Date     SECTION  10/29/2013    CHOLECYSTECTOMY      DILATION AND CURETTAGE OF UTERUS      DILATION AND CURETTAGE OF UTERUS N/A 2019    DILATATION AND CURETTAGE SUCTION performed by Praveen Lagunas MD at [address] [place] [address] History     Tobacco Use   Smoking Status Never Smoker   Smokeless Tobacco Never Used        Social History     Substance and Sexual Activity   Alcohol Use No       No results found for this visit on 20. Vitals:  /76   Wt 196 lb (88.9 kg)   BMI 33.64 kg/m²   Estimated body mass index is 33.64 kg/m² as calculated from the following:    Height as of 20: 5' 4\" (1.626 m). Weight as of this encounter: 196 lb (88.9 kg). HPI: here for initial ob exam, with known twin max gestation     PT denies fever, chills, nausea and vomiting       Abdomen: enlarged, gravid, soft, nontender     Total body exam completed please see prenatal physical exam tab in visit navigator       Results reviewed today:    No results found for this visit on 20. See prenatal vital sign section and fetal assessment section    ASSESSMENT & Plan    Diagnosis Orders   1. Dysuria  Culture, Urine   2. Acute vaginitis  Culture, Genital   3.  Dichorionic diamniotic twin pregnancy in first trimester               I am having Donny Lagunas maintain her progesterone, Prenatal Vit-Fe Fumarate-FA (PRENATAL VITAMINS PO), blood glucose monitor supplies, ondansetron, metoclopramide, cephALEXin, insulin glargine, Accu-Chek Softclix Lancets, ondansetron, Accu-Chek Dalia Plus, ondansetron, and (Insulin Aspart, w/Niacinamide, (FIASP SC)). Return in about 3 weeks (around 12/28/2020) for ob sono for viability x2. There are no Patient Instructions on file for this visit.           Nia Candelario,12/7/2020 2:59 PM

## 2020-12-09 LAB
CULTURE: ABNORMAL
Lab: ABNORMAL
SPECIMEN DESCRIPTION: ABNORMAL

## 2020-12-09 RX ORDER — FLUCONAZOLE 150 MG/1
TABLET ORAL
Qty: 3 TABLET | Refills: 0 | Status: SHIPPED | OUTPATIENT
Start: 2020-12-09 | End: 2021-01-05 | Stop reason: ALTCHOICE

## 2020-12-09 RX ORDER — NITROFURANTOIN 25; 75 MG/1; MG/1
100 CAPSULE ORAL 2 TIMES DAILY
Qty: 14 CAPSULE | Refills: 0 | Status: SHIPPED | OUTPATIENT
Start: 2020-12-09 | End: 2020-12-16

## 2020-12-10 LAB
CULTURE: ABNORMAL
Lab: ABNORMAL
SPECIMEN DESCRIPTION: ABNORMAL

## 2020-12-14 ENCOUNTER — HOSPITAL ENCOUNTER (OUTPATIENT)
Dept: LAB | Age: 35
Discharge: HOME | End: 2020-12-14
Payer: COMMERCIAL

## 2020-12-14 LAB — GLUCOSE BLD-MCNC: 118 MG/DL (ref 70–99)

## 2020-12-14 PROCEDURE — 82985 ASSAY OF GLYCATED PROTEIN: CPT

## 2020-12-14 PROCEDURE — 82947 ASSAY GLUCOSE BLOOD QUANT: CPT

## 2020-12-14 PROCEDURE — 36415 COLL VENOUS BLD VENIPUNCTURE: CPT

## 2020-12-16 LAB — FRUCTOSAMINE: 248 UMOL/L (ref 170–285)

## 2020-12-17 RX ORDER — ONDANSETRON 4 MG/1
4 TABLET, FILM COATED ORAL EVERY 8 HOURS PRN
Qty: 30 TABLET | Refills: 1 | Status: SHIPPED | OUTPATIENT
Start: 2020-12-17 | End: 2021-01-07 | Stop reason: SDUPTHER

## 2020-12-31 ENCOUNTER — HOSPITAL ENCOUNTER (OUTPATIENT)
Dept: LAB | Age: 35
Discharge: HOME | End: 2020-12-31
Payer: COMMERCIAL

## 2020-12-31 LAB — GLUCOSE BLD-MCNC: 116 MG/DL (ref 70–99)

## 2020-12-31 PROCEDURE — 82985 ASSAY OF GLYCATED PROTEIN: CPT

## 2020-12-31 PROCEDURE — 36415 COLL VENOUS BLD VENIPUNCTURE: CPT

## 2020-12-31 PROCEDURE — 82947 ASSAY GLUCOSE BLOOD QUANT: CPT

## 2021-01-02 LAB — FRUCTOSAMINE: 216 UMOL/L (ref 170–285)

## 2021-01-05 ENCOUNTER — ROUTINE PRENATAL (OUTPATIENT)
Dept: OBGYN | Age: 36
End: 2021-01-05
Payer: COMMERCIAL

## 2021-01-05 ENCOUNTER — HOSPITAL ENCOUNTER (OUTPATIENT)
Age: 36
Setting detail: SPECIMEN
Discharge: HOME | End: 2021-01-05
Payer: COMMERCIAL

## 2021-01-05 VITALS — WEIGHT: 198.8 LBS | DIASTOLIC BLOOD PRESSURE: 65 MMHG | SYSTOLIC BLOOD PRESSURE: 101 MMHG | BODY MASS INDEX: 34.12 KG/M2

## 2021-01-05 DIAGNOSIS — Z3A.16 16 WEEKS GESTATION OF PREGNANCY: Primary | ICD-10-CM

## 2021-01-05 DIAGNOSIS — N39.0 URINARY TRACT INFECTION WITHOUT HEMATURIA, SITE UNSPECIFIED: ICD-10-CM

## 2021-01-05 DIAGNOSIS — O30.042 DICHORIONIC DIAMNIOTIC TWIN PREGNANCY IN SECOND TRIMESTER: ICD-10-CM

## 2021-01-05 DIAGNOSIS — N39.0 URINARY TRACT INFECTION WITHOUT HEMATURIA, SITE UNSPECIFIED: Primary | ICD-10-CM

## 2021-01-05 PROCEDURE — 87086 URINE CULTURE/COLONY COUNT: CPT

## 2021-01-05 PROCEDURE — 0502F SUBSEQUENT PRENATAL CARE: CPT | Performed by: ADVANCED PRACTICE MIDWIFE

## 2021-01-05 ASSESSMENT — PATIENT HEALTH QUESTIONNAIRE - PHQ9
SUM OF ALL RESPONSES TO PHQ QUESTIONS 1-9: 0
1. LITTLE INTEREST OR PLEASURE IN DOING THINGS: 0
2. FEELING DOWN, DEPRESSED OR HOPELESS: 0
SUM OF ALL RESPONSES TO PHQ QUESTIONS 1-9: 0

## 2021-01-05 NOTE — PROGRESS NOTES
Ash Hager is here at 16w3d for:    Chief Complaint   Patient presents with    Routine Prenatal Visit     pt would like to speak with isabel about the covid vaccine. pt also has pelvic pressure maybe due to placement of babies? pt states she does not have UTI symptoms but states she has been having them in the past recently. Estimated Due Date: Estimated Date of Delivery: 21    OB History    Para Term  AB Living   5 1 1   2 1   SAB TAB Ectopic Molar Multiple Live Births   2                # Outcome Date GA Lbr Luis/2nd Weight Sex Delivery Anes PTL Lv   5 Current            4 Term 10/29/13 39w2d  7 lb 9.2 oz (3.435 kg) M CS-Unspec      3 2012 6w0d          2 2011 6w0d          1                  Past Medical History:   Diagnosis Date    Kidney calculi        Past Surgical History:   Procedure Laterality Date     SECTION  10/29/2013    CHOLECYSTECTOMY      DILATION AND CURETTAGE OF UTERUS      DILATION AND CURETTAGE OF UTERUS N/A 2019    DILATATION AND CURETTAGE SUCTION performed by Tish Roman MD at [address] [place] [address] History     Tobacco Use   Smoking Status Never Smoker   Smokeless Tobacco Never Used        Social History     Substance and Sexual Activity   Alcohol Use No       No results found for this visit on 21. Vitals:  /65   Wt 198 lb 12.8 oz (90.2 kg)   BMI 34.12 kg/m²   Estimated body mass index is 34.12 kg/m² as calculated from the following:    Height as of 20: 5' 4\" (1.626 m). Weight as of this encounter: 198 lb 12.8 oz (90.2 kg). HPI: here for routine ob visit for di/ di twins, has IDDM likely pregestational because identified at initial ob visit. Has had hx of UTIs/ treated. Is feeling \"better\"     PT denies fever, chills, nausea and vomiting       Abdomen: enlarged, gravid, soft, nontender       Results reviewed today:    No results found for this visit on 21.     See prenatal vital sign section and fetal assessment section    ASSESSMENT & Plan    Diagnosis Orders   1. 16 weeks gestation of pregnancy     2. Dichorionic diamniotic twin pregnancy in second trimester               I have discontinued Stefano Lagunas's progesterone, cephALEXin, and fluconazole. I am also having her maintain her Prenatal Vit-Fe Fumarate-FA (PRENATAL VITAMINS PO), blood glucose monitor supplies, metoclopramide, insulin glargine, Accu-Chek Softclix Lancets, Accu-Chek Dalia Plus, (Insulin Aspart, w/Niacinamide, (FIASP SC)), and ondansetron. Return in about 2 weeks (around 1/19/2021) for ob. There are no Patient Instructions on file for this visit.           Ervin Candelario,1/5/2021 1:55 PM

## 2021-01-07 DIAGNOSIS — O21.9 NAUSEA AND VOMITING DURING PREGNANCY: ICD-10-CM

## 2021-01-07 LAB
CULTURE: NORMAL
Lab: NORMAL
SPECIMEN DESCRIPTION: NORMAL

## 2021-01-07 RX ORDER — ONDANSETRON 4 MG/1
4 TABLET, FILM COATED ORAL EVERY 8 HOURS PRN
Qty: 30 TABLET | Refills: 1 | Status: SHIPPED | OUTPATIENT
Start: 2021-01-07 | End: 2021-11-05 | Stop reason: ALTCHOICE

## 2021-01-19 ENCOUNTER — HOSPITAL ENCOUNTER (OUTPATIENT)
Age: 36
Discharge: HOME | End: 2021-01-19
Payer: COMMERCIAL

## 2021-01-19 ENCOUNTER — ROUTINE PRENATAL (OUTPATIENT)
Dept: OBGYN | Age: 36
End: 2021-01-19
Payer: COMMERCIAL

## 2021-01-19 VITALS — SYSTOLIC BLOOD PRESSURE: 118 MMHG | DIASTOLIC BLOOD PRESSURE: 78 MMHG | BODY MASS INDEX: 34.09 KG/M2 | WEIGHT: 198.6 LBS

## 2021-01-19 DIAGNOSIS — O30.042 DICHORIONIC DIAMNIOTIC TWIN PREGNANCY IN SECOND TRIMESTER: Primary | ICD-10-CM

## 2021-01-19 DIAGNOSIS — O24.414 INSULIN CONTROLLED GESTATIONAL DIABETES MELLITUS (GDM) IN SECOND TRIMESTER: ICD-10-CM

## 2021-01-19 DIAGNOSIS — Z3A.18 18 WEEKS GESTATION OF PREGNANCY: ICD-10-CM

## 2021-01-19 LAB — GLUCOSE BLD-MCNC: 166 MG/DL (ref 70–99)

## 2021-01-19 PROCEDURE — 82985 ASSAY OF GLYCATED PROTEIN: CPT

## 2021-01-19 PROCEDURE — 36415 COLL VENOUS BLD VENIPUNCTURE: CPT

## 2021-01-19 PROCEDURE — 0502F SUBSEQUENT PRENATAL CARE: CPT | Performed by: ADVANCED PRACTICE MIDWIFE

## 2021-01-19 PROCEDURE — 82947 ASSAY GLUCOSE BLOOD QUANT: CPT

## 2021-01-19 NOTE — PROGRESS NOTES
Kiera Harrington is here at 18w3d for:    Chief Complaint   Patient presents with    Routine Prenatal Visit     Follow up blood sugars, patient has appointment with Dr. Meryle Drones this week. Estimated Due Date: Estimated Date of Delivery: 21    OB History    Para Term  AB Living   5 1 1   2 1   SAB TAB Ectopic Molar Multiple Live Births   2                # Outcome Date GA Lbr Luis/2nd Weight Sex Delivery Anes PTL Lv   5 Current            4 Term 10/29/13 39w2d  7 lb 9.2 oz (3.435 kg) M CS-Unspec      3 2012 6w0d          2 2011 6w0d          1                  Past Medical History:   Diagnosis Date    Kidney calculi        Past Surgical History:   Procedure Laterality Date     SECTION  10/29/2013    CHOLECYSTECTOMY      DILATION AND CURETTAGE OF UTERUS      DILATION AND CURETTAGE OF UTERUS N/A 2019    DILATATION AND CURETTAGE SUCTION performed by Leny Fountain MD at [address] [place] [address] History     Tobacco Use   Smoking Status Never Smoker   Smokeless Tobacco Never Used        Social History     Substance and Sexual Activity   Alcohol Use No       No results found for this visit on 21. Vitals:  /78   Wt 198 lb 9.6 oz (90.1 kg)   BMI 34.09 kg/m²   Estimated body mass index is 34.09 kg/m² as calculated from the following:    Height as of 20: 5' 4\" (1.626 m). Weight as of this encounter: 198 lb 9.6 oz (90.1 kg). HPI: here for routine ob visit, is following Dr Meryle Drones for diabetes; feels stress at work makes her sugars elevate;      PT denies fever, chills, nausea and vomiting       Abdomen: enlarged, gravid, soft, nontender  FHR A LLQ  FHR B LUQ       Results reviewed today:    No results found for this visit on 21. See prenatal vital sign section and fetal assessment section    ASSESSMENT & Plan    Diagnosis Orders   1. Dichorionic diamniotic twin pregnancy in second trimester     2.  Insulin controlled gestational diabetes mellitus (GDM) in second trimester     3. 18 weeks gestation of pregnancy               I am having Jennifer Shaw. Bebo maintain her Prenatal Vit-Fe Fumarate-FA (PRENATAL VITAMINS PO), blood glucose monitor supplies, metoclopramide, insulin glargine, Accu-Chek Softclix Lancets, Accu-Chek Dalia Plus, (Insulin Aspart, w/Niacinamide, (FIASP SC)), and ondansetron. Return in about 2 weeks (around 2/2/2021) for ob 20wkus. There are no Patient Instructions on file for this visit.           Flakito Candelario,1/19/2021 1:10 PM

## 2021-01-20 LAB — FRUCTOSAMINE: 212 UMOL/L (ref 170–285)

## 2021-02-01 ENCOUNTER — HOSPITAL ENCOUNTER (OUTPATIENT)
Age: 36
Discharge: HOME | End: 2021-02-01
Payer: COMMERCIAL

## 2021-02-01 LAB — GLUCOSE BLD-MCNC: 138 MG/DL (ref 70–99)

## 2021-02-01 PROCEDURE — 82947 ASSAY GLUCOSE BLOOD QUANT: CPT

## 2021-02-01 PROCEDURE — 82985 ASSAY OF GLYCATED PROTEIN: CPT

## 2021-02-01 PROCEDURE — 36415 COLL VENOUS BLD VENIPUNCTURE: CPT

## 2021-02-02 ENCOUNTER — ROUTINE PRENATAL (OUTPATIENT)
Dept: OBGYN | Age: 36
End: 2021-02-02
Payer: COMMERCIAL

## 2021-02-02 VITALS — SYSTOLIC BLOOD PRESSURE: 118 MMHG | DIASTOLIC BLOOD PRESSURE: 68 MMHG | BODY MASS INDEX: 34.4 KG/M2 | WEIGHT: 200.4 LBS

## 2021-02-02 DIAGNOSIS — O30.042 DICHORIONIC DIAMNIOTIC TWIN PREGNANCY IN SECOND TRIMESTER: Primary | ICD-10-CM

## 2021-02-02 DIAGNOSIS — Z3A.20 20 WEEKS GESTATION OF PREGNANCY: ICD-10-CM

## 2021-02-02 DIAGNOSIS — O24.414 INSULIN CONTROLLED GESTATIONAL DIABETES MELLITUS (GDM) IN SECOND TRIMESTER: ICD-10-CM

## 2021-02-02 PROCEDURE — 0502F SUBSEQUENT PRENATAL CARE: CPT | Performed by: ADVANCED PRACTICE MIDWIFE

## 2021-02-02 NOTE — PROGRESS NOTES
Low Jacobson is here at [facility] for:    Chief Complaint   Patient presents with    Routine Prenatal Visit     Follow up in house ultrasound. Estimated Due Date: Estimated Date of Delivery: 21    OB History    Para Term  AB Living   5 1 1   2 1   SAB TAB Ectopic Molar Multiple Live Births   2                # Outcome Date GA Lbr Luis/2nd Weight Sex Delivery Anes PTL Lv   5 Current            4 Term 10/29/13 39w2d  7 lb 9.2 oz (3.435 kg) M CS-Unspec      3 2012 6w0d          2 2011 6w0d          1                  Past Medical History:   Diagnosis Date    Kidney calculi        Past Surgical History:   Procedure Laterality Date     SECTION  10/29/2013    CHOLECYSTECTOMY      DILATION AND CURETTAGE OF UTERUS      DILATION AND CURETTAGE OF UTERUS N/A 2019    DILATATION AND CURETTAGE SUCTION performed by Laron Rey MD at [address] [place] [address] History     Tobacco Use   Smoking Status Never Smoker   Smokeless Tobacco Never Used        Social History     Substance and Sexual Activity   Alcohol Use No       No results found for this visit on 21. Vitals:  /68   Wt 200 lb 6.4 oz (90.9 kg)   BMI 34.40 kg/m²   Estimated body mass index is 34.4 kg/m² as calculated from the following:    Height as of 20: 5' 4\" (1.626 m). Weight as of this encounter: 200 lb 6.4 oz (90.9 kg). HPI: here for routine ob visit and fetal surveillance for twins, concordant growth     PT denies fever, chills, nausea and vomiting       Abdomen: enlarged, gravid, soft, nontender         Results reviewed today:    No results found for this visit on 21. See prenatal vital sign section and fetal assessment section    ASSESSMENT & Plan    Diagnosis Orders   1. Dichorionic diamniotic twin pregnancy in second trimester     2. 20 weeks gestation of pregnancy     3.  Insulin controlled gestational diabetes mellitus (GDM) in second trimester               I am having Denia Lagunas maintain her Prenatal Vit-Fe Fumarate-FA (PRENATAL VITAMINS PO), blood glucose monitor supplies, metoclopramide, insulin glargine, Accu-Chek Softclix Lancets, Accu-Chek Dalia Plus, (Insulin Aspart, w/Niacinamide, (FIASP SC)), and ondansetron. Return 2weeks ob appt, 4 weeks ob appt and sono for twin growth. There are no Patient Instructions on file for this visit.           Lachelle Candelario,2/2/2021 5:08 PM

## 2021-02-03 LAB — FRUCTOSAMINE: 223 UMOL/L (ref 170–285)

## 2021-02-17 ENCOUNTER — HOSPITAL ENCOUNTER (OUTPATIENT)
Age: 36
Discharge: HOME | End: 2021-02-17
Payer: COMMERCIAL

## 2021-02-17 ENCOUNTER — ROUTINE PRENATAL (OUTPATIENT)
Dept: OBGYN | Age: 36
End: 2021-02-17
Payer: COMMERCIAL

## 2021-02-17 VITALS — DIASTOLIC BLOOD PRESSURE: 84 MMHG | SYSTOLIC BLOOD PRESSURE: 136 MMHG | WEIGHT: 203.4 LBS | BODY MASS INDEX: 34.91 KG/M2

## 2021-02-17 DIAGNOSIS — Z3A.22 22 WEEKS GESTATION OF PREGNANCY: Primary | ICD-10-CM

## 2021-02-17 DIAGNOSIS — O30.042 DICHORIONIC DIAMNIOTIC TWIN PREGNANCY IN SECOND TRIMESTER: ICD-10-CM

## 2021-02-17 LAB — GLUCOSE BLD-MCNC: 128 MG/DL (ref 70–99)

## 2021-02-17 PROCEDURE — 82985 ASSAY OF GLYCATED PROTEIN: CPT

## 2021-02-17 PROCEDURE — 0502F SUBSEQUENT PRENATAL CARE: CPT | Performed by: ADVANCED PRACTICE MIDWIFE

## 2021-02-17 PROCEDURE — 36415 COLL VENOUS BLD VENIPUNCTURE: CPT

## 2021-02-17 PROCEDURE — 82947 ASSAY GLUCOSE BLOOD QUANT: CPT

## 2021-02-17 NOTE — PROGRESS NOTES
Delphine Paulson is here at 22w4d for:    Chief Complaint   Patient presents with   Pam Ashraf Routine Prenatal Visit       Estimated Due Date: Estimated Date of Delivery: 21    OB History    Para Term  AB Living   5 1 1   2 1   SAB TAB Ectopic Molar Multiple Live Births   2                # Outcome Date GA Lbr Lius/2nd Weight Sex Delivery Anes PTL Lv   5 Current            4 Term 10/29/13 39w2d  7 lb 9.2 oz (3.435 kg) M CS-Unspec      3 2012 6w0d          2 2011 6w0d          1                  Past Medical History:   Diagnosis Date    Kidney calculi        Past Surgical History:   Procedure Laterality Date     SECTION  10/29/2013    CHOLECYSTECTOMY      DILATION AND CURETTAGE OF UTERUS      DILATION AND CURETTAGE OF UTERUS N/A 2019    DILATATION AND CURETTAGE SUCTION performed by Lovely Swann MD at [address] [place] [address] History     Tobacco Use   Smoking Status Never Smoker   Smokeless Tobacco Never Used        Social History     Substance and Sexual Activity   Alcohol Use No       No results found for this visit on 21. Vitals:  /84   Wt 203 lb 6.4 oz (92.3 kg)   BMI 34.91 kg/m²   Estimated body mass index is 34.91 kg/m² as calculated from the following:    Height as of 20: 5' 4\" (1.626 m). Weight as of this encounter: 203 lb 6.4 oz (92.3 kg). HPI: here for routine ob visit, feels she can differentiate A (left ) B (right) babies movements     PT denies fever, chills, nausea and vomiting       Abdomen: enlarged, gravid, soft, nontender         Results reviewed today:    No results found for this visit on 21. See prenatal vital sign section and fetal assessment section    ASSESSMENT & Plan    Diagnosis Orders   1. 22 weeks gestation of pregnancy     2. Dichorionic diamniotic twin pregnancy in second trimester               I am having Shelbi Richardsmichelet  Bebo maintain her Prenatal Vit-Fe Fumarate-FA (PRENATAL VITAMINS PO), blood glucose monitor

## 2021-02-19 LAB — FRUCTOSAMINE: 220 UMOL/L (ref 170–285)

## 2021-03-01 ENCOUNTER — HOSPITAL ENCOUNTER (OUTPATIENT)
Age: 36
Discharge: HOME | End: 2021-03-01
Payer: COMMERCIAL

## 2021-03-01 LAB — GLUCOSE BLD-MCNC: 113 MG/DL (ref 70–99)

## 2021-03-01 PROCEDURE — 82947 ASSAY GLUCOSE BLOOD QUANT: CPT

## 2021-03-01 PROCEDURE — 36415 COLL VENOUS BLD VENIPUNCTURE: CPT

## 2021-03-01 PROCEDURE — 82985 ASSAY OF GLYCATED PROTEIN: CPT

## 2021-03-02 ENCOUNTER — ROUTINE PRENATAL (OUTPATIENT)
Dept: OBGYN | Age: 36
End: 2021-03-02
Payer: COMMERCIAL

## 2021-03-02 VITALS — SYSTOLIC BLOOD PRESSURE: 126 MMHG | WEIGHT: 203 LBS | DIASTOLIC BLOOD PRESSURE: 78 MMHG | BODY MASS INDEX: 34.84 KG/M2

## 2021-03-02 DIAGNOSIS — Z3A.24 24 WEEKS GESTATION OF PREGNANCY: ICD-10-CM

## 2021-03-02 DIAGNOSIS — O30.042 DICHORIONIC DIAMNIOTIC TWIN PREGNANCY IN SECOND TRIMESTER: Primary | ICD-10-CM

## 2021-03-02 PROCEDURE — 0502F SUBSEQUENT PRENATAL CARE: CPT | Performed by: ADVANCED PRACTICE MIDWIFE

## 2021-03-02 NOTE — PROGRESS NOTES
Low Jacobson is here at 24w3d for:    Chief Complaint   Patient presents with    Routine Prenatal Visit     Follow up in house ultrasound. Unable to obtain urine. Estimated Due Date: Estimated Date of Delivery: 21    OB History    Para Term  AB Living   5 1 1   2 1   SAB TAB Ectopic Molar Multiple Live Births   2                # Outcome Date GA Lbr Luis/2nd Weight Sex Delivery Anes PTL Lv   5 Current            4 Term 10/29/13 39w2d  7 lb 9.2 oz (3.435 kg) M CS-Unspec      3 2012 6w0d          2 2011 6w0d          1                  Past Medical History:   Diagnosis Date    Kidney calculi        Past Surgical History:   Procedure Laterality Date     SECTION  10/29/2013    CHOLECYSTECTOMY      DILATION AND CURETTAGE OF UTERUS      DILATION AND CURETTAGE OF UTERUS N/A 2019    DILATATION AND CURETTAGE SUCTION performed by Luz Marina Sorensen MD at [address] [place] [address] History     Tobacco Use   Smoking Status Never Smoker   Smokeless Tobacco Never Used        Social History     Substance and Sexual Activity   Alcohol Use No       No results found for this visit on 21. Vitals:  /78   Wt 203 lb (92.1 kg)   BMI 34.84 kg/m²   Estimated body mass index is 34.84 kg/m² as calculated from the following:    Height as of 20: 5' 4\" (1.626 m). Weight as of this encounter: 203 lb (92.1 kg). HPI: here for routine ob visit and continued fetal growth surveillance x2     PT denies fever, chills, nausea and vomiting       Abdomen: enlarged, gravid, soft, nontender         Results reviewed today:  Sono:  TWIN A  INFERIOR, MATERNAL RT  25.2 WK IUP  EFW:60%  CL:3.3CM  HR:141bpm  posterior placenta, cephalic presentation  Active fetal movements     TWIN B  SUPERIOR, MATERNAL LT  24.5 WK IUP  EFW:53%  HR:155bpm  posterior placenta, breech presentation  Active fetal movements  No results found for this visit on 21.     See prenatal vital sign section and fetal assessment section    ASSESSMENT & Plan    Diagnosis Orders   1. Dichorionic diamniotic twin pregnancy in second trimester     2. 24 weeks gestation of pregnancy               I am having Debera Clock. Bebo maintain her Prenatal Vit-Fe Fumarate-FA (PRENATAL VITAMINS PO), blood glucose monitor supplies, metoclopramide, insulin glargine, Accu-Chek Softclix Lancets, Accu-Chek Dalia Plus, (Insulin Aspart, w/Niacinamide, (FIASP SC)), and ondansetron. Return in about 2 weeks (around 3/16/2021) for 26wk ob, 28 wk sono and ob, 30 wk ob 32 wk sono and ob and nst then that weekly, ob. There are no Patient Instructions on file for this visit.           Ervin Candelario,3/2/2021 9:13 PM

## 2021-03-03 LAB — FRUCTOSAMINE: 215 UMOL/L (ref 170–285)

## 2021-03-16 ENCOUNTER — ROUTINE PRENATAL (OUTPATIENT)
Dept: OBGYN | Age: 36
End: 2021-03-16
Payer: COMMERCIAL

## 2021-03-16 ENCOUNTER — HOSPITAL ENCOUNTER (OUTPATIENT)
Age: 36
Discharge: HOME | End: 2021-03-16
Payer: COMMERCIAL

## 2021-03-16 VITALS — BODY MASS INDEX: 34.84 KG/M2 | DIASTOLIC BLOOD PRESSURE: 78 MMHG | WEIGHT: 203 LBS | SYSTOLIC BLOOD PRESSURE: 130 MMHG

## 2021-03-16 DIAGNOSIS — Z3A.26 26 WEEKS GESTATION OF PREGNANCY: ICD-10-CM

## 2021-03-16 DIAGNOSIS — O30.042 DICHORIONIC DIAMNIOTIC TWIN PREGNANCY IN SECOND TRIMESTER: Primary | ICD-10-CM

## 2021-03-16 DIAGNOSIS — O24.414 INSULIN CONTROLLED GESTATIONAL DIABETES MELLITUS (GDM) IN SECOND TRIMESTER: ICD-10-CM

## 2021-03-16 LAB — GLUCOSE TOLERANCE TEST 2 HOUR: 109 MG/DL (ref 60–140)

## 2021-03-16 PROCEDURE — 0502F SUBSEQUENT PRENATAL CARE: CPT | Performed by: ADVANCED PRACTICE MIDWIFE

## 2021-03-16 PROCEDURE — 36415 COLL VENOUS BLD VENIPUNCTURE: CPT

## 2021-03-16 PROCEDURE — 82947 ASSAY GLUCOSE BLOOD QUANT: CPT

## 2021-03-16 PROCEDURE — 82985 ASSAY OF GLYCATED PROTEIN: CPT

## 2021-03-16 NOTE — PROGRESS NOTES
Abe Conde is here at 26w3d for:    Chief Complaint   Patient presents with   [facility] Routine Prenatal Visit     Patient had first dose of COVID vaccine , will be due for second dose 2021. Estimated Due Date: Estimated Date of Delivery: 21    OB History    Para Term  AB Living   5 1 1   2 1   SAB TAB Ectopic Molar Multiple Live Births   2                # Outcome Date GA Lbr Luis/2nd Weight Sex Delivery Anes PTL Lv   5 Current            4 Term 10/29/13 39w2d  7 lb 9.2 oz (3.435 kg) M CS-Unspec      3 2012 6w0d          2 2011 6w0d          1                  Past Medical History:   Diagnosis Date    Kidney calculi        Past Surgical History:   Procedure Laterality Date     SECTION  10/29/2013    CHOLECYSTECTOMY      DILATION AND CURETTAGE OF UTERUS      DILATION AND CURETTAGE OF UTERUS N/A 2019    DILATATION AND CURETTAGE SUCTION performed by Mami Perez MD at [facility] History     Tobacco Use   Smoking Status Never Smoker   Smokeless Tobacco Never Used        Social History     Substance and Sexual Activity   Alcohol Use No       No results found for this visit on 21. Vitals:  /78   Wt 203 lb (92.1 kg)   BMI 34.84 kg/m²   Estimated body mass index is 34.84 kg/m² as calculated from the following:    Height as of 20: 5' 4\" (1.626 m). Weight as of this encounter: 203 lb (92.1 kg). HPI: here for routine ob visit, still managing sugars with Dr Cristin Olivarez, 17-10-10-10, c/o intermittant leg cramps at night     PT denies fever, chills, nausea and vomiting       Abdomen: enlarged, gravid, soft, nontender         Results reviewed today:    No results found for this visit on 21. See prenatal vital sign section and fetal assessment section    ASSESSMENT & Plan    Diagnosis Orders   1. Dichorionic diamniotic twin pregnancy in second trimester     2. 26 weeks gestation of pregnancy     3.  Insulin controlled gestational diabetes mellitus (GDM) in second trimester           comfort measures discussed    I am having Kishore Samuel. Bebo maintain her Prenatal Vit-Fe Fumarate-FA (PRENATAL VITAMINS PO), blood glucose monitor supplies, metoclopramide, insulin glargine, Accu-Chek Softclix Lancets, Accu-Chek Dalia Plus, (Insulin Aspart, w/Niacinamide, (FIASP SC)), and ondansetron. Return in about 2 weeks (around 3/30/2021), or keep appointment with mireya inman at that time. There are no Patient Instructions on file for this visit.           Elodia Candelario,3/16/2021 10:30 AM

## 2021-03-18 LAB — FRUCTOSAMINE: 200 UMOL/L (ref 170–285)

## 2021-03-30 ENCOUNTER — TELEPHONE (OUTPATIENT)
Dept: OBGYN | Age: 36
End: 2021-03-30

## 2021-03-30 ENCOUNTER — ROUTINE PRENATAL (OUTPATIENT)
Dept: OBGYN | Age: 36
End: 2021-03-30
Payer: COMMERCIAL

## 2021-03-30 VITALS — SYSTOLIC BLOOD PRESSURE: 130 MMHG | BODY MASS INDEX: 35.7 KG/M2 | WEIGHT: 208 LBS | DIASTOLIC BLOOD PRESSURE: 76 MMHG

## 2021-03-30 DIAGNOSIS — Z3A.28 28 WEEKS GESTATION OF PREGNANCY: ICD-10-CM

## 2021-03-30 DIAGNOSIS — O24.414 INSULIN CONTROLLED GESTATIONAL DIABETES MELLITUS (GDM) IN THIRD TRIMESTER: ICD-10-CM

## 2021-03-30 DIAGNOSIS — O30.043 DICHORIONIC DIAMNIOTIC TWIN PREGNANCY IN THIRD TRIMESTER: Primary | ICD-10-CM

## 2021-03-30 DIAGNOSIS — O92.70 LACTATION DISORDER: ICD-10-CM

## 2021-03-30 LAB — HGB, POC: 10

## 2021-03-30 PROCEDURE — 0502F SUBSEQUENT PRENATAL CARE: CPT | Performed by: ADVANCED PRACTICE MIDWIFE

## 2021-03-30 RX ORDER — BREAST PUMP
EACH MISCELLANEOUS
Qty: 1 EACH | Refills: 0 | Status: ON HOLD | OUTPATIENT
Start: 2021-03-30 | End: 2021-05-20 | Stop reason: HOSPADM

## 2021-03-30 NOTE — PROGRESS NOTES
Yesenia Cronin is here at 28w3d for:    Chief Complaint   Patient presents with   Jaz Middleton Routine Prenatal Visit     28.3 weeks-Discuss today's US-Twins       Estimated Due Date: Estimated Date of Delivery: 21    OB History    Para Term  AB Living   5 1 1   2 1   SAB TAB Ectopic Molar Multiple Live Births   2                # Outcome Date GA Lbr Luis/2nd Weight Sex Delivery Anes PTL Lv   5 Current            4 Term 10/29/13 39w2d  7 lb 9.2 oz (3.435 kg) M CS-Unspec      3 2012 6w0d          2 2011 6w0d          1                  Past Medical History:   Diagnosis Date    Kidney calculi        Past Surgical History:   Procedure Laterality Date     SECTION  10/29/2013    CHOLECYSTECTOMY      DILATION AND CURETTAGE OF UTERUS      DILATION AND CURETTAGE OF UTERUS N/A 2019    DILATATION AND CURETTAGE SUCTION performed by Ej Vasquez MD at [address] [place] [address] History     Tobacco Use   Smoking Status Never Smoker   Smokeless Tobacco Never Used        Social History     Substance and Sexual Activity   Alcohol Use No       No results found for this visit on 21. Vitals:  /78   Wt 208 lb (94.3 kg)   BMI 35.70 kg/m²   Estimated body mass index is 35.7 kg/m² as calculated from the following:    Height as of 20: 5' 4\" (1.626 m). Weight as of this encounter: 208 lb (94.3 kg).       HPI: here for routine ob visit; growth sonos concordant; patient reports her blood sugars are better controlled and Dr. JOSETTE FRAZIER [facility] [place] has spaced her appointments out to every 3 weeks     PT denies fever, chills, nausea and vomiting       Abdomen: enlarged, gravid, soft, ontender         Results reviewed today:  Sono:  TWIN A   29.5 WK IUP   EFW: 68%, 3lbs 2oz   CL: 3.8cm   MONROE: 4.9cm, largest fluid pocket   HR: 146bpm   posterior placenta, cephalic presentation   Active fetal movements       TWIN B   29.2WK IUP   EFW: 2lbs 15oz, 60%   CL: 3.8cm   MONROE: 5.1cm, largest fluid pocket HR: 148 bpm   posterior placenta, transverse presentation   Active fetal movements       No results found for this visit on 03/30/21. See prenatal vital sign section and fetal assessment section    ASSESSMENT & Plan    Diagnosis Orders   1. Dichorionic diamniotic twin pregnancy in third trimester     2. 28 weeks gestation of pregnancy     3. Lactation disorder  Misc. Devices (BREAST PUMP) MISC   4. Insulin controlled gestational diabetes mellitus (GDM) in third trimester               I am having Elissa Lai. Bebo start on Breast Pump. I am also having her maintain her Prenatal Vit-Fe Fumarate-FA (PRENATAL VITAMINS PO), blood glucose monitor supplies, metoclopramide, insulin glargine, Accu-Chek Softclix Lancets, Accu-Chek Dalia Plus, (Insulin Aspart, w/Niacinamide, (FIASP SC)), and ondansetron. Return in about 2 weeks (around 4/13/2021) for has growth sono scheduled for 4 weeks, add bpp weekly til 36 also a regular 30 week ob appt in 2 wk. There are no Patient Instructions on file for this visit.           Miguel Candelario,3/30/2021 1:39 PM

## 2021-03-31 NOTE — TELEPHONE ENCOUNTER
I attempted to call patient to confirm her c/s details for 6/7 and had to leave a message on her voicemail. Informed patient that we will sign consents at her 35-36 wk visit and get labs on admission. Patient to call with any further questions/concerns.

## 2021-04-06 ENCOUNTER — HOSPITAL ENCOUNTER (OUTPATIENT)
Age: 36
Discharge: HOME | End: 2021-04-06
Payer: COMMERCIAL

## 2021-04-06 LAB — GLUCOSE BLD-MCNC: 151 MG/DL (ref 70–99)

## 2021-04-06 PROCEDURE — 82947 ASSAY GLUCOSE BLOOD QUANT: CPT

## 2021-04-06 PROCEDURE — 36415 COLL VENOUS BLD VENIPUNCTURE: CPT

## 2021-04-06 PROCEDURE — 82985 ASSAY OF GLYCATED PROTEIN: CPT

## 2021-04-06 NOTE — TELEPHONE ENCOUNTER
Per Bill Gifford, we are keeping the c/s at 6/7. Patient is being monitored closely by Dr. Argentina Gregory and can move earlier if anything changes.

## 2021-04-07 LAB — FRUCTOSAMINE: 201 UMOL/L (ref 170–285)

## 2021-04-12 ENCOUNTER — HOSPITAL ENCOUNTER (OUTPATIENT)
Age: 36
Setting detail: OBSERVATION
Discharge: HOME | End: 2021-04-13
Attending: OBSTETRICS & GYNECOLOGY | Admitting: OBSTETRICS & GYNECOLOGY
Payer: COMMERCIAL

## 2021-04-12 ENCOUNTER — TELEPHONE (OUTPATIENT)
Dept: OBGYN | Age: 36
End: 2021-04-12

## 2021-04-12 ENCOUNTER — APPOINTMENT (OUTPATIENT)
Dept: ULTRASOUND IMAGING | Age: 36
End: 2021-04-12
Payer: COMMERCIAL

## 2021-04-12 PROBLEM — O30.023: Status: ACTIVE | Noted: 2021-04-12

## 2021-04-12 LAB
ABSOLUTE EOS #: 0.13 K/UL (ref 0–0.44)
ABSOLUTE IMMATURE GRANULOCYTE: 0.18 K/UL (ref 0–0.3)
ABSOLUTE LYMPH #: 1.69 K/UL (ref 1.1–3.7)
ABSOLUTE MONO #: 0.79 K/UL (ref 0.1–1.2)
ALBUMIN SERPL-MCNC: 3 G/DL (ref 3.5–5.2)
ALBUMIN/GLOBULIN RATIO: 1 (ref 1–2.5)
ALP BLD-CCNC: 118 U/L (ref 35–104)
ALT SERPL-CCNC: 23 U/L (ref 5–33)
ANION GAP SERPL CALCULATED.3IONS-SCNC: 10 MMOL/L (ref 9–17)
AST SERPL-CCNC: 24 U/L
BASOPHILS # BLD: 1 % (ref 0–2)
BASOPHILS ABSOLUTE: 0.05 K/UL (ref 0–0.2)
BILIRUB SERPL-MCNC: 0.36 MG/DL (ref 0.3–1.2)
BUN BLDV-MCNC: 8 MG/DL (ref 6–20)
BUN/CREAT BLD: 14 (ref 9–20)
CALCIUM SERPL-MCNC: 8.7 MG/DL (ref 8.6–10.4)
CHLORIDE BLD-SCNC: 103 MMOL/L (ref 98–107)
CO2: 22 MMOL/L (ref 20–31)
CREAT SERPL-MCNC: 0.57 MG/DL (ref 0.5–0.9)
DIFFERENTIAL TYPE: ABNORMAL
EOSINOPHILS RELATIVE PERCENT: 1 % (ref 1–4)
GFR AFRICAN AMERICAN: >60 ML/MIN
GFR NON-AFRICAN AMERICAN: >60 ML/MIN
GFR SERPL CREATININE-BSD FRML MDRD: ABNORMAL ML/MIN/{1.73_M2}
GFR SERPL CREATININE-BSD FRML MDRD: ABNORMAL ML/MIN/{1.73_M2}
GLUCOSE BLD-MCNC: 88 MG/DL (ref 70–99)
HCT VFR BLD CALC: 29.6 % (ref 36.3–47.1)
HEMOGLOBIN: 9.4 G/DL (ref 11.9–15.1)
IMMATURE GRANULOCYTES: 2 %
LYMPHOCYTES # BLD: 17 % (ref 24–43)
MCH RBC QN AUTO: 28.5 PG (ref 25.2–33.5)
MCHC RBC AUTO-ENTMCNC: 31.8 G/DL (ref 28.4–34.8)
MCV RBC AUTO: 89.7 FL (ref 82.6–102.9)
MONOCYTES # BLD: 8 % (ref 3–12)
NRBC AUTOMATED: 0 PER 100 WBC
PDW BLD-RTO: 13.9 % (ref 11.8–14.4)
PLATELET # BLD: 211 K/UL (ref 138–453)
PLATELET ESTIMATE: ABNORMAL
PMV BLD AUTO: 11 FL (ref 8.1–13.5)
POTASSIUM SERPL-SCNC: 3.8 MMOL/L (ref 3.7–5.3)
RBC # BLD: 3.3 M/UL (ref 3.95–5.11)
RBC # BLD: ABNORMAL 10*6/UL
SEG NEUTROPHILS: 71 % (ref 36–65)
SEGMENTED NEUTROPHILS ABSOLUTE COUNT: 7.15 K/UL (ref 1.5–8.1)
SODIUM BLD-SCNC: 135 MMOL/L (ref 135–144)
TOTAL PROTEIN: 6.1 G/DL (ref 6.4–8.3)
WBC # BLD: 10 K/UL (ref 3.5–11.3)
WBC # BLD: ABNORMAL 10*3/UL

## 2021-04-12 PROCEDURE — 76815 OB US LIMITED FETUS(S): CPT

## 2021-04-12 PROCEDURE — 76817 TRANSVAGINAL US OBSTETRIC: CPT

## 2021-04-12 PROCEDURE — 85025 COMPLETE CBC W/AUTO DIFF WBC: CPT

## 2021-04-12 PROCEDURE — 36415 COLL VENOUS BLD VENIPUNCTURE: CPT

## 2021-04-12 PROCEDURE — 76816 OB US FOLLOW-UP PER FETUS: CPT

## 2021-04-12 PROCEDURE — 99218 PR INITIAL OBSERVATION CARE/DAY 30 MINUTES: CPT | Performed by: ADVANCED PRACTICE MIDWIFE

## 2021-04-12 PROCEDURE — G0378 HOSPITAL OBSERVATION PER HR: HCPCS

## 2021-04-12 PROCEDURE — 80053 COMPREHEN METABOLIC PANEL: CPT

## 2021-04-12 RX ORDER — FERROUS SULFATE 325(65) MG
325 TABLET ORAL
COMMUNITY
End: 2021-11-05 | Stop reason: ALTCHOICE

## 2021-04-12 RX ORDER — ACETAMINOPHEN 325 MG/1
650 TABLET ORAL EVERY 6 HOURS PRN
Status: ON HOLD | COMMUNITY
End: 2021-05-20 | Stop reason: HOSPADM

## 2021-04-12 NOTE — FLOWSHEET NOTE
RN at bedside holding fetal monitors on for 20 minute strip. Patient in right tilt position.  remains in room at bedside. Both RN and patient feel and hear movements for both A and B fetus. Pt denies feeling any discomfort/cramping. Abdomen palpates soft. Discussed diet and menu provided.

## 2021-04-12 NOTE — FLOWSHEET NOTE
Called Naheed Carranza CNM, no answer. Message left with office staff to have her return call when out of patient's room.

## 2021-04-12 NOTE — FLOWSHEET NOTE
Patient gave herself 15 units and ate lunch tray. 1 hour after insulin and eating, pt checked blood sugar reports level of 92.

## 2021-04-12 NOTE — FLOWSHEET NOTE
Patient states her blood glucose was 101 after supper after she gave herself 12 units of insulin aspart before supper at 6:00pm.

## 2021-04-12 NOTE — FLOWSHEET NOTE
Wendy Snell returns call. Requested to pull up EFM/toco monitoring to review. Made aware of lunch time blood sugar.  Orders received patient to stay as Observation patient over night and BPP x 2 in am.

## 2021-04-12 NOTE — FLOWSHEET NOTE
MONIKA Candelario CNM phones units, made aware of Ultrasound findings and bloody discharge on transvaginal u/s wand, lab results, EFM/ [place] readings, new orders received.

## 2021-04-12 NOTE — FLOWSHEET NOTE
Lyn May CNM calls and reviewed EFM/[facility], states she is looking at monitor in office. Aware RN is in room turning patient at this time and lab attempting labwork and ultrasound up on unit. Wants ultrasound to be done now with patient laying on side versus on back.

## 2021-04-12 NOTE — FLOWSHEET NOTE
Cristian Lopez CNM phones unit for update, made aware of recent EFM/[place] recordings, new order to continue intermittent monitoring every 4 hours for 20 mins.

## 2021-04-12 NOTE — FLOWSHEET NOTE
Ashley Shi CNM at nurses station, made aware BP being done every hour, new order received to modify to every 4 hours.

## 2021-04-13 ENCOUNTER — APPOINTMENT (OUTPATIENT)
Dept: ULTRASOUND IMAGING | Age: 36
End: 2021-04-13
Payer: COMMERCIAL

## 2021-04-13 ENCOUNTER — TELEPHONE (OUTPATIENT)
Dept: OBGYN | Age: 36
End: 2021-04-13

## 2021-04-13 VITALS
HEART RATE: 91 BPM | RESPIRATION RATE: 16 BRPM | TEMPERATURE: 97.8 F | BODY MASS INDEX: 35.68 KG/M2 | WEIGHT: 209 LBS | SYSTOLIC BLOOD PRESSURE: 109 MMHG | DIASTOLIC BLOOD PRESSURE: 70 MMHG | HEIGHT: 64 IN

## 2021-04-13 PROBLEM — O30.043 DICHORIONIC DIAMNIOTIC TWIN PREGNANCY IN THIRD TRIMESTER: Status: ACTIVE | Noted: 2021-04-13

## 2021-04-13 PROBLEM — O30.023: Status: RESOLVED | Noted: 2021-04-12 | Resolved: 2021-04-13

## 2021-04-13 LAB
ABSOLUTE EOS #: 0.11 K/UL (ref 0–0.44)
ABSOLUTE IMMATURE GRANULOCYTE: 0.26 K/UL (ref 0–0.3)
ABSOLUTE LYMPH #: 2.15 K/UL (ref 1.1–3.7)
ABSOLUTE MONO #: 0.67 K/UL (ref 0.1–1.2)
ALBUMIN SERPL-MCNC: 2.9 G/DL (ref 3.5–5.2)
ALBUMIN/GLOBULIN RATIO: 0.9 (ref 1–2.5)
ALP BLD-CCNC: 121 U/L (ref 35–104)
ALT SERPL-CCNC: 23 U/L (ref 5–33)
ANION GAP SERPL CALCULATED.3IONS-SCNC: 8 MMOL/L (ref 9–17)
AST SERPL-CCNC: 25 U/L
BASOPHILS # BLD: 1 % (ref 0–2)
BASOPHILS ABSOLUTE: 0.05 K/UL (ref 0–0.2)
BILIRUB SERPL-MCNC: 0.39 MG/DL (ref 0.3–1.2)
BUN BLDV-MCNC: 10 MG/DL (ref 6–20)
BUN/CREAT BLD: 16 (ref 9–20)
CALCIUM SERPL-MCNC: 8.9 MG/DL (ref 8.6–10.4)
CHLORIDE BLD-SCNC: 101 MMOL/L (ref 98–107)
CO2: 21 MMOL/L (ref 20–31)
CREAT SERPL-MCNC: 0.62 MG/DL (ref 0.5–0.9)
DIFFERENTIAL TYPE: ABNORMAL
EOSINOPHILS RELATIVE PERCENT: 1 % (ref 1–4)
GFR AFRICAN AMERICAN: >60 ML/MIN
GFR NON-AFRICAN AMERICAN: >60 ML/MIN
GFR SERPL CREATININE-BSD FRML MDRD: ABNORMAL ML/MIN/{1.73_M2}
GFR SERPL CREATININE-BSD FRML MDRD: ABNORMAL ML/MIN/{1.73_M2}
GLUCOSE BLD-MCNC: 79 MG/DL (ref 70–99)
HCT VFR BLD CALC: 30.3 % (ref 36.3–47.1)
HEMOGLOBIN: 9.5 G/DL (ref 11.9–15.1)
IMMATURE GRANULOCYTES: 2 %
LYMPHOCYTES # BLD: 19 % (ref 24–43)
MCH RBC QN AUTO: 28.4 PG (ref 25.2–33.5)
MCHC RBC AUTO-ENTMCNC: 31.4 G/DL (ref 28.4–34.8)
MCV RBC AUTO: 90.4 FL (ref 82.6–102.9)
MONOCYTES # BLD: 6 % (ref 3–12)
NRBC AUTOMATED: 0 PER 100 WBC
PDW BLD-RTO: 14.1 % (ref 11.8–14.4)
PLATELET # BLD: 228 K/UL (ref 138–453)
PLATELET ESTIMATE: ABNORMAL
PMV BLD AUTO: 11 FL (ref 8.1–13.5)
POTASSIUM SERPL-SCNC: 3.8 MMOL/L (ref 3.7–5.3)
RBC # BLD: 3.35 M/UL (ref 3.95–5.11)
RBC # BLD: ABNORMAL 10*6/UL
SEG NEUTROPHILS: 71 % (ref 36–65)
SEGMENTED NEUTROPHILS ABSOLUTE COUNT: 7.83 K/UL (ref 1.5–8.1)
SODIUM BLD-SCNC: 130 MMOL/L (ref 135–144)
TOTAL PROTEIN: 6.2 G/DL (ref 6.4–8.3)
WBC # BLD: 11.1 K/UL (ref 3.5–11.3)
WBC # BLD: ABNORMAL 10*3/UL

## 2021-04-13 PROCEDURE — 76819 FETAL BIOPHYS PROFIL W/O NST: CPT

## 2021-04-13 PROCEDURE — 99214 OFFICE O/P EST MOD 30 MIN: CPT

## 2021-04-13 PROCEDURE — 80053 COMPREHEN METABOLIC PANEL: CPT

## 2021-04-13 PROCEDURE — 85025 COMPLETE CBC W/AUTO DIFF WBC: CPT

## 2021-04-13 PROCEDURE — G0378 HOSPITAL OBSERVATION PER HR: HCPCS

## 2021-04-13 PROCEDURE — 36415 COLL VENOUS BLD VENIPUNCTURE: CPT

## 2021-04-13 PROCEDURE — 99217 PR OBSERVATION CARE DISCHARGE MANAGEMENT: CPT | Performed by: ADVANCED PRACTICE MIDWIFE

## 2021-04-13 NOTE — PROGRESS NOTES
Department of Obstetrics and Gynecology   Progress Note      SUBJECTIVE:  Patient has been sleeping through night, no further pink or bloody discharge noted. OBJECTIVE:      Vitals:    04/12/21 1738 04/12/21 1838 04/12/21 2050 04/13/21 0059   BP: 116/68 123/80 124/70 121/68   Pulse: 99 100 96 83   Resp: 16 16 20 16   Temp:   98 °F (36.7 °C) 98.2 °F (36.8 °C)   TempSrc:   Oral Oral   Weight:       Height:         Blood sugars stable on current insulin dosing     Fetal heart rate:    On scheduled hand held fetal strip, baby A (right) had decel to 60s x 30 seconds, with baseline 115-120 moderate variability, accels present, baby B (left) 1305 baseline, moderate variability, accels present and no decels         Contraction frequency: denies and not graphed or palpated    Membranes:  No further discharge    Cervix:       CL by sono 2.53cm           ASSESSMENT & PLAN:    Orders to continually monitor fetuses x2

## 2021-04-13 NOTE — FLOWSHEET NOTE
Baby A and B are both staying on monitor, SPO2 applied to verify maternal heart rate as well. Will adjust and hold monitors if any baby comes off.

## 2021-04-13 NOTE — FLOWSHEET NOTE
MONIKA Brewer CNM calls back and would like us to hand hold babies on the monitor. Explained that mother just switched position and is now up to the bathroom.

## 2021-04-13 NOTE — FLOWSHEET NOTE
Chacha Omalley [facility] phones unit with discharge orders. States pt is to be seen Thursday 4/15 for BPP, NST and Doppler Studies. States she is going to reach out to [facility] and have pt be seen by them. Provider states she spoke with pt regarding modified bed rest. Pt is to also be off work.

## 2021-04-13 NOTE — FLOWSHEET NOTE
Mother turned back to semi fowlers, monitors readjusted. BMI 32.6kg/m2 classified class I obese/obese BMI 32.6kg/m2 (dry weight) classified class I obese/obese

## 2021-04-13 NOTE — H&P
 Smoking status: Never Smoker    Smokeless tobacco: Never Used   Substance and Sexual Activity    Alcohol use: No    Drug use: No    Sexual activity: Yes     Partners: Male   Lifestyle    Physical activity     Days per week: Not on file     Minutes per session: Not on file    Stress: Not on file   Relationships    Social connections     Talks on phone: Not on file     Gets together: Not on file     Attends [employer] service: Not on file     Active member of club or organization: Not on file     Attends meetings of clubs or organizations: Not on file     Relationship status: Not on file    Intimate partner violence     Fear of current or ex partner: Not on file     Emotionally abused: Not on file     Physically abused: Not on file     Forced sexual activity: Not on file   Other Topics Concern    Not on file   Social History Narrative    Not on file     Family History:       Problem Relation Age of Onset    High Blood Pressure Father     Diabetes Paternal Grandmother      Medications Prior to Admission:  Medications Prior to Admission: ferrous sulfate (IRON 325) 325 (65 Fe) MG tablet, Take 325 mg by mouth daily (with breakfast)  acetaminophen (TYLENOL) 325 MG tablet, Take 650 mg by mouth every 6 hours as needed for Pain  ondansetron (ZOFRAN) 4 MG tablet, Take 1 tablet by mouth every 8 hours as needed for Nausea or Vomiting  Insulin Aspart, w/Niacinamide, (FIASP SC), Inject into the skin  insulin glargine (LANTUS;BASAGLAR) 100 UNIT/ML injection pen, Inject into the skin  Accu-Chek Softclix Lancets MISC, TEST BLOOD SUGAR FASTING IN AM AND 1 HOUR POSTPRANDIAL 3 TIMES DAILY  Blood Glucose Monitoring Suppl (ACCU-CHEK LIZZETTE PLUS) w/Device KIT, USE AS DIRECTED  blood glucose monitor supplies, One glucometer, test strips, lancets - per insurance coverage Test blood sugar fasting in am and 1 hour postprandial three times daily  Prenatal Vit-Fe Fumarate-FA (PRENATAL VITAMINS PO), Take by mouth  Misc.  Devices (BREAST PUMP) MISC, Double electric pump - plans breastfeeding  metoclopramide (REGLAN) 10 MG tablet, Take 1 tablet by mouth 3 times daily (with meals)    REVIEW OF SYSTEMS:    CONSTITUTIONAL:  negative  RESPIRATORY:  negative  CARDIOVASCULAR:  negative  GASTROINTESTINAL:  negative  ALLERGIC/IMMUNOLOGIC:  negative  NEUROLOGICAL:  negative  BEHAVIOR/PSYCH:  negative    PHYSICAL EXAM:  Vitals:    04/12/21 1738 04/12/21 1838 04/12/21 2050 04/13/21 0059   BP: 116/68 123/80 124/70 121/68   Pulse: 99 100 96 83   Resp: 16 16 20 16   Temp:   98 °F (36.7 °C) 98.2 °F (36.8 °C)   TempSrc:   Oral Oral   Weight:       Height:         General appearance:  awake, alert, cooperative, no apparent distress, and appears stated age  Neurologic:  Awake, alert, oriented to name, place and time. Lungs:  No increased work of breathing, good air exchange  Abdomen:  Soft, non tender, gravid, consistent   Fetal heart rate:  Reassuring when continuous strips obtained with handholding  Pelvis:  Adequate pelvis    Contraction frequency:  rare minutes    Membranes:  Intact    Labs:   CBC:   Lab Results   Component Value Date    WBC 10.0 04/12/2021    RBC 3.30 04/12/2021    HGB 9.4 04/12/2021    HCT 29.6 04/12/2021    MCV 89.7 04/12/2021    MCH 28.5 04/12/2021    MCHC 31.8 04/12/2021    RDW 13.9 04/12/2021     04/12/2021    MPV 11.0 04/12/2021     CMP:    Lab Results   Component Value Date     04/12/2021    K 3.8 04/12/2021     04/12/2021    CO2 22 04/12/2021    BUN 8 04/12/2021    CREATININE 0.57 04/12/2021    GFRAA >60 04/12/2021    LABGLOM >60 04/12/2021    GLUCOSE 88 04/12/2021    PROT 6.1 04/12/2021    LABALBU 3.0 04/12/2021    CALCIUM 8.7 04/12/2021    BILITOT 0.36 04/12/2021    ALKPHOS 118 04/12/2021    AST 24 04/12/2021    ALT 23 04/12/2021     Sono:  Twin live intrauterine pregnancy with gestational age of 34 weeks 2 days by   current sonographic biometry.  The estimated due date is 15 Danitza 2021.    Cervical length is 2.56 cm.  No funneling is noted.  Appropriate interval   growth has taken place since prior study at which time the [facility] was   approximately an average of 12 June 2021. Reported single pockets of fluid measured for baby A 6.c cm and 6.3 for baby B and 1497 grams:1563 grams respectively    ASSESSMENT AND PLAN:    Estimated length of stay: observe overnight    Active Problems:    Twin gestation, max, third trimester   GDM, insulin controlled   Pink vaginal discharge        GBS: unknown  Other: discussed plan with Dr. Kiersten Granado, will observe for any signs of labor or fluid leaking overnight, will intermittantly handhold monitorring of FHRs x2 and get biophysical profiles in AM.  Patient is to continue her own diet and insulin regimen per Dr. Corinna Canas current orders for her at home (see nurses documentation). Marlon Servin.  MEERA Candelario,4/13/2021 1:55 AM

## 2021-04-13 NOTE — FLOWSHEET NOTE
Strips obtained on babies. Denies any pain but some occ intermittent cervical pains. Reports having some dark red discharge when wiping which is what she presented to unit with.

## 2021-04-13 NOTE — FLOWSHEET NOTE
Trying to get comfortable in rt tilt position. Notified of POC, will continue to monitor babies for the rest of the night.

## 2021-04-13 NOTE — FLOWSHEET NOTE
Baby A has deceleration from 110s to 60s for 60 seconds, then strip breaks, writer in at 0131 to adjust belts. Dimas Galdamez CNM notified of the deceleration. Orders to do continuous monitoring for the rest of the night. She will pull up strip and review at home.

## 2021-04-14 DIAGNOSIS — O30.043 DICHORIONIC DIAMNIOTIC TWIN PREGNANCY IN THIRD TRIMESTER: Primary | ICD-10-CM

## 2021-04-14 NOTE — TELEPHONE ENCOUNTER
Referral placed to [facility]  Patient aware they will call her with appointment. Will contact me with additional questions or concerns.

## 2021-04-15 ENCOUNTER — ROUTINE PRENATAL (OUTPATIENT)
Dept: OBGYN | Age: 36
End: 2021-04-15
Payer: COMMERCIAL

## 2021-04-15 VITALS — WEIGHT: 205 LBS | BODY MASS INDEX: 35.19 KG/M2 | SYSTOLIC BLOOD PRESSURE: 130 MMHG | DIASTOLIC BLOOD PRESSURE: 78 MMHG

## 2021-04-15 DIAGNOSIS — Z3A.30 30 WEEKS GESTATION OF PREGNANCY: Primary | ICD-10-CM

## 2021-04-15 DIAGNOSIS — O30.043 DICHORIONIC DIAMNIOTIC TWIN PREGNANCY IN THIRD TRIMESTER: ICD-10-CM

## 2021-04-15 DIAGNOSIS — O24.414 INSULIN CONTROLLED GESTATIONAL DIABETES MELLITUS (GDM) IN THIRD TRIMESTER: ICD-10-CM

## 2021-04-15 PROCEDURE — 59025 FETAL NON-STRESS TEST: CPT | Performed by: ADVANCED PRACTICE MIDWIFE

## 2021-04-15 PROCEDURE — 0502F SUBSEQUENT PRENATAL CARE: CPT | Performed by: ADVANCED PRACTICE MIDWIFE

## 2021-04-15 NOTE — PROGRESS NOTES
Symone Cunningham is here at 30w5d for:    Chief Complaint   Patient presents with    Routine Prenatal Visit     Follow up in house NST. C/O frequent headaches. Estimated Due Date: Estimated Date of Delivery: 21    OB History    Para Term  AB Living   5 1 1   2 1   SAB TAB Ectopic Molar Multiple Live Births   2                # Outcome Date GA Lbr Luis/2nd Weight Sex Delivery Anes PTL Lv   5 Current            4 Term 10/29/13 39w2d  7 lb 9.2 oz (3.435 kg) M CS-Unspec      3 2012 6w0d          2 2011 6w0d          1                  Past Medical History:   Diagnosis Date    Kidney calculi        Past Surgical History:   Procedure Laterality Date     SECTION  10/29/2013    CHOLECYSTECTOMY      DILATION AND CURETTAGE OF UTERUS      DILATION AND CURETTAGE OF UTERUS N/A 2019    DILATATION AND CURETTAGE SUCTION performed by Amanda Velez MD at [address] [place] [address] History     Tobacco Use   Smoking Status Never Smoker   Smokeless Tobacco Never Used        Social History     Substance and Sexual Activity   Alcohol Use No       No results found for this visit on 04/15/21. Vitals:  /78   Wt 205 lb (93 kg)   BMI 35.19 kg/m²   Estimated body mass index is 35.19 kg/m² as calculated from the following:    Height as of 21: 5' 4\" (1.626 m). Weight as of this encounter: 205 lb (93 kg). HPI: here for fetal surveillance, nst x2; sugars well controlled     PT denies fever, chills, nausea and vomiting       Abdomen: enlarged, gravid, soft, nontender         Results reviewed today:    No results found for this visit on 04/15/21. See prenatal vital sign section and fetal assessment section    ASSESSMENT & Plan    Diagnosis Orders   1. 30 weeks gestation of pregnancy     2. Dichorionic diamniotic twin pregnancy in third trimester     3.  Insulin controlled gestational diabetes mellitus (GDM) in third trimester       Continue fetal surveillance; has appointment in [facility]. Vs on 5/17. This was to be to add fetal surveillance measures as patients diabetes is well controlled by Dr. Oj Fitzpatrick. Patient wonders if necessary, I instructed her to keep appointment but may not be necessary at that time        I am having Juancarlos Ybarra. Bebo maintain her Prenatal Vit-Fe Fumarate-FA (PRENATAL VITAMINS PO), blood glucose monitor supplies, metoclopramide, insulin glargine, Accu-Chek Softclix Lancets, Accu-Chek Dalia Plus, (Insulin Aspart, w/Niacinamide, (FIASP SC)), ondansetron, Breast Pump, ferrous sulfate, and acetaminophen. Return tomorrow for bpp and dopplers, monday for nst x2. There are no Patient Instructions on file for this visit.           Daysi Candelario,4/15/2021 2:53 PM

## 2021-04-19 ENCOUNTER — ROUTINE PRENATAL (OUTPATIENT)
Dept: OBGYN | Age: 36
End: 2021-04-19
Payer: COMMERCIAL

## 2021-04-19 ENCOUNTER — HOSPITAL ENCOUNTER (OUTPATIENT)
Age: 36
Discharge: HOME | End: 2021-04-19
Payer: COMMERCIAL

## 2021-04-19 VITALS — BODY MASS INDEX: 35.36 KG/M2 | WEIGHT: 206 LBS | SYSTOLIC BLOOD PRESSURE: 134 MMHG | DIASTOLIC BLOOD PRESSURE: 82 MMHG

## 2021-04-19 DIAGNOSIS — Z3A.31 31 WEEKS GESTATION OF PREGNANCY: Primary | ICD-10-CM

## 2021-04-19 DIAGNOSIS — G44.89 OTHER HEADACHE SYNDROME: ICD-10-CM

## 2021-04-19 DIAGNOSIS — O30.043 DICHORIONIC DIAMNIOTIC TWIN PREGNANCY IN THIRD TRIMESTER: ICD-10-CM

## 2021-04-19 DIAGNOSIS — O30.043 DICHORIONIC DIAMNIOTIC TWIN PREGNANCY IN THIRD TRIMESTER: Primary | ICD-10-CM

## 2021-04-19 DIAGNOSIS — O24.414 INSULIN CONTROLLED GESTATIONAL DIABETES MELLITUS (GDM) IN THIRD TRIMESTER: ICD-10-CM

## 2021-04-19 LAB — GLUCOSE BLD-MCNC: 128 MG/DL (ref 70–99)

## 2021-04-19 PROCEDURE — 36415 COLL VENOUS BLD VENIPUNCTURE: CPT

## 2021-04-19 PROCEDURE — 0502F SUBSEQUENT PRENATAL CARE: CPT | Performed by: ADVANCED PRACTICE MIDWIFE

## 2021-04-19 PROCEDURE — 59025 FETAL NON-STRESS TEST: CPT | Performed by: ADVANCED PRACTICE MIDWIFE

## 2021-04-19 PROCEDURE — 82985 ASSAY OF GLYCATED PROTEIN: CPT

## 2021-04-19 PROCEDURE — 82947 ASSAY GLUCOSE BLOOD QUANT: CPT

## 2021-04-19 RX ORDER — BUTALBITAL, ACETAMINOPHEN AND CAFFEINE 50; 325; 40 MG/1; MG/1; MG/1
TABLET ORAL
Qty: 30 TABLET | Refills: 1 | Status: SHIPPED | OUTPATIENT
Start: 2021-04-19 | End: 2021-11-05 | Stop reason: ALTCHOICE

## 2021-04-19 NOTE — PROGRESS NOTES
Anastasia Donis is here at 31w2d for:    Chief Complaint   Patient presents with    Routine Prenatal Visit     Follow up in house NST. Estimated Due Date: Estimated Date of Delivery: 21    OB History    Para Term  AB Living   5 1 1   2 1   SAB TAB Ectopic Molar Multiple Live Births   2                # Outcome Date GA Lbr Luis/2nd Weight Sex Delivery Anes PTL Lv   5 Current            4 Term 10/29/13 39w2d  7 lb 9.2 oz (3.435 kg) M CS-Unspec      3 2012 6w0d          2 2011 6w0d          1                  Past Medical History:   Diagnosis Date    Kidney calculi        Past Surgical History:   Procedure Laterality Date     SECTION  10/29/2013    CHOLECYSTECTOMY      DILATION AND CURETTAGE OF UTERUS      DILATION AND CURETTAGE OF UTERUS N/A 2019    DILATATION AND CURETTAGE SUCTION performed by Reji Perez MD at [address] [place] [address] History     Tobacco Use   Smoking Status Never Smoker   Smokeless Tobacco Never Used        Social History     Substance and Sexual Activity   Alcohol Use No       No results found for this visit on 21. Vitals:  /82   Wt 206 lb (93.4 kg)   BMI 35.36 kg/m²   Estimated body mass index is 35.36 kg/m² as calculated from the following:    Height as of 21: 5' 4\" (1.626 m). Weight as of this encounter: 206 lb (93.4 kg). HPI: here for fetal surveillance, max twins and gdm insulin controlled; sugars have been stable, no changes in insulin over last \"several weeks\"     PT denies fever, chills, nausea and vomiting       Abdomen: enlarged, gravid, soft, nontender     NST reactive x2    Results reviewed today:    No results found for this visit on 21. See prenatal vital sign section and fetal assessment section    ASSESSMENT & Plan    Diagnosis Orders   1. Dichorionic diamniotic twin pregnancy in third trimester               I am having Ronen Santiago.  Bebo maintain her Prenatal Vit-Fe Fumarate-FA (PRENATAL VITAMINS PO), blood glucose monitor supplies, metoclopramide, insulin glargine, Accu-Chek Softclix Lancets, Accu-Chek Dalia Plus, (Insulin Aspart, w/Niacinamide, (FIASP SC)), ondansetron, Breast Pump, ferrous sulfate, and acetaminophen. No follow-ups on file. There are no Patient Instructions on file for this visit.           An Candelario,4/19/2021 4:01 PM

## 2021-04-20 LAB — FRUCTOSAMINE: 207 UMOL/L (ref 170–285)

## 2021-04-22 ENCOUNTER — ROUTINE PRENATAL (OUTPATIENT)
Dept: OBGYN | Age: 36
End: 2021-04-22
Payer: COMMERCIAL

## 2021-04-22 ENCOUNTER — HOSPITAL ENCOUNTER (OUTPATIENT)
Dept: ULTRASOUND IMAGING | Age: 36
Discharge: HOME | End: 2021-04-24
Payer: COMMERCIAL

## 2021-04-22 VITALS — SYSTOLIC BLOOD PRESSURE: 128 MMHG | WEIGHT: 208 LBS | BODY MASS INDEX: 35.7 KG/M2 | DIASTOLIC BLOOD PRESSURE: 82 MMHG

## 2021-04-22 DIAGNOSIS — O24.414 INSULIN CONTROLLED GESTATIONAL DIABETES MELLITUS (GDM) IN THIRD TRIMESTER: ICD-10-CM

## 2021-04-22 DIAGNOSIS — Z3A.31 31 WEEKS GESTATION OF PREGNANCY: Primary | ICD-10-CM

## 2021-04-22 DIAGNOSIS — O30.043 DICHORIONIC DIAMNIOTIC TWIN PREGNANCY IN THIRD TRIMESTER: ICD-10-CM

## 2021-04-22 PROCEDURE — 76817 TRANSVAGINAL US OBSTETRIC: CPT

## 2021-04-22 PROCEDURE — 0502F SUBSEQUENT PRENATAL CARE: CPT | Performed by: ADVANCED PRACTICE MIDWIFE

## 2021-04-22 PROCEDURE — 76819 FETAL BIOPHYS PROFIL W/O NST: CPT

## 2021-04-22 NOTE — PROGRESS NOTES
Evert Givens is here at 31w5d for:    Chief Complaint   Patient presents with   [facility] Routine Prenatal Visit     31.5 weeks-doing well, no concerns BPP done today in radiology dept       Estimated Due Date: Estimated Date of Delivery: 21    OB History    Para Term  AB Living   5 1 1   2 1   SAB TAB Ectopic Molar Multiple Live Births   2                # Outcome Date GA Lbr Luis/2nd Weight Sex Delivery Anes PTL Lv   5 Current            4 Term 10/29/13 39w2d  7 lb 9.2 oz (3.435 kg) M CS-Unspec      3 2012 6w0d          2 2011 6w0d          1                  Past Medical History:   Diagnosis Date    Kidney calculi        Past Surgical History:   Procedure Laterality Date     SECTION  10/29/2013    CHOLECYSTECTOMY      DILATION AND CURETTAGE OF UTERUS      DILATION AND CURETTAGE OF UTERUS N/A 2019    DILATATION AND CURETTAGE SUCTION performed by Mariana Mckeon MD at [facility] History     Tobacco Use   Smoking Status Never Smoker   Smokeless Tobacco Never Used        Social History     Substance and Sexual Activity   Alcohol Use No       No results found for this visit on 21. Vitals:  /82   Wt 208 lb (94.3 kg)   BMI 35.70 kg/m²   Estimated body mass index is 35.7 kg/m² as calculated from the following:    Height as of 21: 5' 4\" (1.626 m). Weight as of this encounter: 208 lb (94.3 kg). HPI: CL 1.8cm today, bpp 8/8 x2     PT denies fever, chills, nausea and vomiting       Abdomen: enlarged, gravid, soft, nontender, palpated movements of both babies   A vertex right, B breech left    Results reviewed today:    No results found for this visit on 21. See prenatal vital sign section and fetal assessment section    ASSESSMENT & Plan    Diagnosis Orders   1. 31 weeks gestation of pregnancy     2. Dichorionic diamniotic twin pregnancy in third trimester               I am having Naalehu Cords.  Bebo maintain her Prenatal Vit-Fe Fumarate-FA (PRENATAL VITAMINS PO), blood glucose monitor supplies, metoclopramide, insulin glargine, Accu-Chek Softclix Lancets, Accu-Chek Dalia Plus, (Insulin Aspart, w/Niacinamide, (FIASP SC)), ondansetron, Breast Pump, ferrous sulfate, acetaminophen, and butalbital-acetaminophen-caffeine. Return continue twice weekly testing. There are no Patient Instructions on file for this visit.           Regina Candelario,4/23/2021 11:46 AM

## 2021-04-23 ENCOUNTER — TELEPHONE (OUTPATIENT)
Dept: OBGYN | Age: 36
End: 2021-04-23

## 2021-04-26 ENCOUNTER — ROUTINE PRENATAL (OUTPATIENT)
Dept: OBGYN | Age: 36
End: 2021-04-26
Payer: COMMERCIAL

## 2021-04-26 VITALS — SYSTOLIC BLOOD PRESSURE: 124 MMHG | DIASTOLIC BLOOD PRESSURE: 78 MMHG

## 2021-04-26 DIAGNOSIS — O30.043 DICHORIONIC DIAMNIOTIC TWIN PREGNANCY IN THIRD TRIMESTER: ICD-10-CM

## 2021-04-26 PROCEDURE — 0502F SUBSEQUENT PRENATAL CARE: CPT | Performed by: OBSTETRICS & GYNECOLOGY

## 2021-04-29 ENCOUNTER — ROUTINE PRENATAL (OUTPATIENT)
Dept: OBGYN | Age: 36
End: 2021-04-29
Payer: COMMERCIAL

## 2021-04-29 VITALS — SYSTOLIC BLOOD PRESSURE: 128 MMHG | BODY MASS INDEX: 35.53 KG/M2 | WEIGHT: 207 LBS | DIASTOLIC BLOOD PRESSURE: 76 MMHG

## 2021-04-29 DIAGNOSIS — Z3A.32 32 WEEKS GESTATION OF PREGNANCY: ICD-10-CM

## 2021-04-29 DIAGNOSIS — O30.043 DICHORIONIC DIAMNIOTIC TWIN PREGNANCY IN THIRD TRIMESTER: ICD-10-CM

## 2021-04-29 DIAGNOSIS — O24.414 INSULIN CONTROLLED GESTATIONAL DIABETES MELLITUS (GDM) IN THIRD TRIMESTER: Primary | ICD-10-CM

## 2021-04-29 PROCEDURE — 59025 FETAL NON-STRESS TEST: CPT | Performed by: ADVANCED PRACTICE MIDWIFE

## 2021-04-29 PROCEDURE — 0502F SUBSEQUENT PRENATAL CARE: CPT | Performed by: ADVANCED PRACTICE MIDWIFE

## 2021-04-30 NOTE — PROGRESS NOTES
Trudy Rodriguez is here at [facility] for:    Chief Complaint   Patient presents with    Routine Prenatal Visit     Follow up in house ultrasound. Estimated Due Date: Estimated Date of Delivery: 21    OB History    Para Term  AB Living   5 1 1   2 1   SAB TAB Ectopic Molar Multiple Live Births   2                # Outcome Date GA Lbr Luis/2nd Weight Sex Delivery Anes PTL Lv   5 Current            4 Term 10/29/13 39w2d  7 lb 9.2 oz (3.435 kg) M CS-Unspec      3 2012 6w0d          2 2011 6w0d          1                  Past Medical History:   Diagnosis Date    Kidney calculi        Past Surgical History:   Procedure Laterality Date     SECTION  10/29/2013    CHOLECYSTECTOMY      DILATION AND CURETTAGE OF UTERUS      DILATION AND CURETTAGE OF UTERUS N/A 2019    DILATATION AND CURETTAGE SUCTION performed by Raza Beyer MD at [place] [address] History     Tobacco Use   Smoking Status Never Smoker   Smokeless Tobacco Never Used        Social History     Substance and Sexual Activity   Alcohol Use No       No results found for this visit on 21. Vitals:  /76   Wt 207 lb (93.9 kg)   BMI 35.53 kg/m²   Estimated body mass index is 35.53 kg/m² as calculated from the following:    Height as of 21: 5' 4\" (1.626 m). Weight as of this encounter: 207 lb (93.9 kg). HPI: here for routine surveillance for max twins and insulin controlled gestational diabetes     PT denies fever, chills, nausea and vomiting       Abdomen: enlarged, gravid, soft, nontender     bpp 6/8 A, 8/8 B; dopplers <3 on both twins    NST reactive x2    Results reviewed today:    No results found for this visit on 21. See prenatal vital sign section and fetal assessment section    ASSESSMENT & Plan    Diagnosis Orders   1. Insulin controlled gestational diabetes mellitus (GDM) in third trimester  CT FETAL NON-STRESS TEST    CT FETAL NON-STRESS TEST   2.  Dichorionic diamniotic twin pregnancy in third trimester     3. 32 weeks gestation of pregnancy               I am having Ronen Santiago. Bebo maintain her Prenatal Vit-Fe Fumarate-FA (PRENATAL VITAMINS PO), blood glucose monitor supplies, metoclopramide, insulin glargine, Accu-Chek Softclix Lancets, Accu-Chek Dalia Plus, (Insulin Aspart, w/Niacinamide, (FIASP SC)), ondansetron, Breast Pump, ferrous sulfate, acetaminophen, and butalbital-acetaminophen-caffeine. Return keep fetal surveillance appointments. There are no Patient Instructions on file for this visit.           Anni Candelario,4/30/2021 9:48 AM

## 2021-05-03 ENCOUNTER — HOSPITAL ENCOUNTER (OUTPATIENT)
Age: 36
Discharge: HOME | End: 2021-05-03
Payer: COMMERCIAL

## 2021-05-03 ENCOUNTER — ROUTINE PRENATAL (OUTPATIENT)
Dept: OBGYN | Age: 36
End: 2021-05-03
Payer: COMMERCIAL

## 2021-05-03 VITALS — DIASTOLIC BLOOD PRESSURE: 80 MMHG | BODY MASS INDEX: 36.05 KG/M2 | WEIGHT: 210 LBS | SYSTOLIC BLOOD PRESSURE: 130 MMHG

## 2021-05-03 DIAGNOSIS — O30.043 DICHORIONIC DIAMNIOTIC TWIN PREGNANCY IN THIRD TRIMESTER: ICD-10-CM

## 2021-05-03 DIAGNOSIS — O24.414 INSULIN CONTROLLED GESTATIONAL DIABETES MELLITUS (GDM) IN THIRD TRIMESTER: ICD-10-CM

## 2021-05-03 DIAGNOSIS — Z3A.33 33 WEEKS GESTATION OF PREGNANCY: Primary | ICD-10-CM

## 2021-05-03 LAB — GLUCOSE BLD-MCNC: 83 MG/DL (ref 70–99)

## 2021-05-03 PROCEDURE — 0502F SUBSEQUENT PRENATAL CARE: CPT | Performed by: ADVANCED PRACTICE MIDWIFE

## 2021-05-03 PROCEDURE — 82947 ASSAY GLUCOSE BLOOD QUANT: CPT

## 2021-05-03 PROCEDURE — 59025 FETAL NON-STRESS TEST: CPT | Performed by: ADVANCED PRACTICE MIDWIFE

## 2021-05-03 PROCEDURE — 36415 COLL VENOUS BLD VENIPUNCTURE: CPT

## 2021-05-03 PROCEDURE — 82985 ASSAY OF GLYCATED PROTEIN: CPT

## 2021-05-03 NOTE — PROGRESS NOTES
Juan Mejia is here at 33w2d for:    Chief Complaint   Patient presents with    Routine Prenatal Visit     Follow up in house NST. Estimated Due Date: Estimated Date of Delivery: 21    OB History    Para Term  AB Living   5 1 1   2 1   SAB TAB Ectopic Molar Multiple Live Births   2                # Outcome Date GA Lbr Luis/2nd Weight Sex Delivery Anes PTL Lv   5 Current            4 Term 10/29/13 39w2d  7 lb 9.2 oz (3.435 kg) M CS-Unspec      3 2012 6w0d          2 2011 6w0d          1                  Past Medical History:   Diagnosis Date    Kidney calculi        Past Surgical History:   Procedure Laterality Date     SECTION  10/29/2013    CHOLECYSTECTOMY      DILATION AND CURETTAGE OF UTERUS      DILATION AND CURETTAGE OF UTERUS N/A 2019    DILATATION AND CURETTAGE SUCTION performed by Kayla Cox MD at [address] [place] [address] History     Tobacco Use   Smoking Status Never Smoker   Smokeless Tobacco Never Used        Social History     Substance and Sexual Activity   Alcohol Use No       No results found for this visit on 21. Vitals:  /80   Wt 210 lb (95.3 kg)   BMI 36.05 kg/m²   Estimated body mass index is 36.05 kg/m² as calculated from the following:    Height as of 21: 5' 4\" (1.626 m). Weight as of this encounter: 210 lb (95.3 kg). HPI: here for twin fetal surveillance; sugars well controlled; c/o one night when she was awakened by a strong contraction, she got up emptied her bladder and monitorred and contraction was about a half hour apart but she fell back to sleep     PT denies fever, chills, nausea and vomiting       Abdomen: enlarged, gravid, soft, nontender     NST reactive x2    Results reviewed today:    No results found for this visit on 21. See prenatal vital sign section and fetal assessment section    ASSESSMENT & Plan    Diagnosis Orders   1. 33 weeks gestation of pregnancy     2.  Dichorionic diamniotic twin pregnancy in third trimester     3. Insulin controlled gestational diabetes mellitus (GDM) in third trimester               I am having Bk Hernandezjose cruz. Bebo maintain her Prenatal Vit-Fe Fumarate-FA (PRENATAL VITAMINS PO), blood glucose monitor supplies, metoclopramide, insulin glargine, Accu-Chek Softclix Lancets, Accu-Chek Dalia Plus, (Insulin Aspart, w/Niacinamide, (FIASP SC)), ondansetron, Breast Pump, ferrous sulfate, acetaminophen, and butalbital-acetaminophen-caffeine. Return next weeks bpps need growths added (not this week). There are no Patient Instructions on file for this visit.           Orlinda Duane Smith,5/3/2021 3:12 PM

## 2021-05-05 LAB — FRUCTOSAMINE: 206 UMOL/L (ref 170–285)

## 2021-05-06 ENCOUNTER — ROUTINE PRENATAL (OUTPATIENT)
Dept: OBGYN | Age: 36
End: 2021-05-06
Payer: COMMERCIAL

## 2021-05-06 VITALS — BODY MASS INDEX: 35.7 KG/M2 | DIASTOLIC BLOOD PRESSURE: 78 MMHG | SYSTOLIC BLOOD PRESSURE: 138 MMHG | WEIGHT: 208 LBS

## 2021-05-06 DIAGNOSIS — O30.043 DICHORIONIC DIAMNIOTIC TWIN PREGNANCY IN THIRD TRIMESTER: ICD-10-CM

## 2021-05-06 DIAGNOSIS — Z3A.33 33 WEEKS GESTATION OF PREGNANCY: Primary | ICD-10-CM

## 2021-05-06 DIAGNOSIS — O24.414 INSULIN CONTROLLED GESTATIONAL DIABETES MELLITUS (GDM) IN THIRD TRIMESTER: ICD-10-CM

## 2021-05-06 PROCEDURE — 0502F SUBSEQUENT PRENATAL CARE: CPT | Performed by: ADVANCED PRACTICE MIDWIFE

## 2021-05-06 NOTE — PROGRESS NOTES
vital sign section and fetal assessment section    ASSESSMENT & Plan    Diagnosis Orders   1. 33 weeks gestation of pregnancy     2. Dichorionic diamniotic twin pregnancy in third trimester     3. Insulin controlled gestational diabetes mellitus (GDM) in third trimester               I am having Deb Fierro. Bebo maintain her Prenatal Vit-Fe Fumarate-FA (PRENATAL VITAMINS PO), blood glucose monitor supplies, metoclopramide, insulin glargine, Accu-Chek Softclix Lancets, Accu-Chek Dalia Plus, (Insulin Aspart, w/Niacinamide, (FIASP SC)), ondansetron, Breast Pump, ferrous sulfate, acetaminophen, and butalbital-acetaminophen-caffeine. Return continue twice weekly testing. There are no Patient Instructions on file for this visit.           Verna Candelario,5/7/2021 4:01 PM

## 2021-05-07 ENCOUNTER — TELEPHONE (OUTPATIENT)
Dept: OBGYN | Age: 36
End: 2021-05-07

## 2021-05-10 ENCOUNTER — ROUTINE PRENATAL (OUTPATIENT)
Dept: OBGYN | Age: 36
End: 2021-05-10
Payer: COMMERCIAL

## 2021-05-10 ENCOUNTER — TELEPHONE (OUTPATIENT)
Dept: OBGYN | Age: 36
End: 2021-05-10

## 2021-05-10 VITALS — DIASTOLIC BLOOD PRESSURE: 82 MMHG | BODY MASS INDEX: 35.7 KG/M2 | WEIGHT: 208 LBS | SYSTOLIC BLOOD PRESSURE: 132 MMHG

## 2021-05-10 DIAGNOSIS — O24.414 INSULIN CONTROLLED GESTATIONAL DIABETES MELLITUS (GDM) IN THIRD TRIMESTER: ICD-10-CM

## 2021-05-10 DIAGNOSIS — O30.043 DICHORIONIC DIAMNIOTIC TWIN PREGNANCY IN THIRD TRIMESTER: ICD-10-CM

## 2021-05-10 DIAGNOSIS — O09.523 MULTIGRAVIDA OF ADVANCED MATERNAL AGE IN THIRD TRIMESTER: ICD-10-CM

## 2021-05-10 DIAGNOSIS — K64.5 THROMBOSED EXTERNAL HEMORRHOID: Primary | ICD-10-CM

## 2021-05-10 DIAGNOSIS — Z3A.34 34 WEEKS GESTATION OF PREGNANCY: ICD-10-CM

## 2021-05-10 PROCEDURE — 59025 FETAL NON-STRESS TEST: CPT | Performed by: ADVANCED PRACTICE MIDWIFE

## 2021-05-10 PROCEDURE — 0502F SUBSEQUENT PRENATAL CARE: CPT | Performed by: ADVANCED PRACTICE MIDWIFE

## 2021-05-10 RX ORDER — HYDROCORTISONE ACETATE PRAMOXINE HCL 2.5; 1 G/100G; G/100G
CREAM TOPICAL 3 TIMES DAILY
Qty: 30 G | Refills: 0 | Status: SHIPPED | OUTPATIENT
Start: 2021-05-10 | End: 2021-11-05 | Stop reason: ALTCHOICE

## 2021-05-10 RX ORDER — LIDOCAINE 50 MG/G
OINTMENT TOPICAL
Qty: 1 TUBE | Refills: 1 | Status: ON HOLD | OUTPATIENT
Start: 2021-05-10 | End: 2021-05-20 | Stop reason: HOSPADM

## 2021-05-11 NOTE — TELEPHONE ENCOUNTER
Can you facilitate cancelling patients appointment with mfm as well as making sure she gets an appointment with Dr. Shashank Evans or Génesis Thompson for thrombosed hemorrhoid

## 2021-05-12 ENCOUNTER — OFFICE VISIT (OUTPATIENT)
Dept: SURGERY | Age: 36
End: 2021-05-12
Payer: COMMERCIAL

## 2021-05-12 VITALS
HEIGHT: 64 IN | SYSTOLIC BLOOD PRESSURE: 107 MMHG | WEIGHT: 211.4 LBS | DIASTOLIC BLOOD PRESSURE: 77 MMHG | HEART RATE: 109 BPM | TEMPERATURE: 98.8 F | BODY MASS INDEX: 36.09 KG/M2

## 2021-05-12 DIAGNOSIS — K64.5 THROMBOSED EXTERNAL HEMORRHOIDS: Primary | ICD-10-CM

## 2021-05-12 PROCEDURE — G8417 CALC BMI ABV UP PARAM F/U: HCPCS | Performed by: SURGERY

## 2021-05-12 PROCEDURE — G8427 DOCREV CUR MEDS BY ELIG CLIN: HCPCS | Performed by: SURGERY

## 2021-05-12 PROCEDURE — 99203 OFFICE O/P NEW LOW 30 MIN: CPT | Performed by: SURGERY

## 2021-05-12 PROCEDURE — 1036F TOBACCO NON-USER: CPT | Performed by: SURGERY

## 2021-05-12 NOTE — PROGRESS NOTES
Patient instructed on the pre-operative, intra-operative, and post-operative process. Patient's surgery arrival time to the hospital and surgery start time confirmed for the day of surgery. Medication instructions reviewed with patient. Pre operative instruction sheet reviewed over the phone. Pt is a local anesthetic.

## 2021-05-13 ENCOUNTER — HOSPITAL ENCOUNTER (OUTPATIENT)
Age: 36
Setting detail: OUTPATIENT SURGERY
Discharge: HOME | End: 2021-05-13
Attending: SURGERY | Admitting: SURGERY
Payer: COMMERCIAL

## 2021-05-13 ENCOUNTER — ROUTINE PRENATAL (OUTPATIENT)
Dept: OBGYN | Age: 36
End: 2021-05-13
Payer: COMMERCIAL

## 2021-05-13 VITALS
DIASTOLIC BLOOD PRESSURE: 60 MMHG | OXYGEN SATURATION: 96 % | BODY MASS INDEX: 35.16 KG/M2 | TEMPERATURE: 97.5 F | HEART RATE: 97 BPM | RESPIRATION RATE: 18 BRPM | WEIGHT: 211 LBS | SYSTOLIC BLOOD PRESSURE: 122 MMHG | HEIGHT: 65 IN

## 2021-05-13 VITALS — BODY MASS INDEX: 35.66 KG/M2 | SYSTOLIC BLOOD PRESSURE: 132 MMHG | DIASTOLIC BLOOD PRESSURE: 80 MMHG | WEIGHT: 211 LBS

## 2021-05-13 DIAGNOSIS — Z3A.34 34 WEEKS GESTATION OF PREGNANCY: ICD-10-CM

## 2021-05-13 DIAGNOSIS — K64.5 HEMORRHOID THROMBOSIS: Primary | ICD-10-CM

## 2021-05-13 DIAGNOSIS — O30.043 DICHORIONIC DIAMNIOTIC TWIN PREGNANCY IN THIRD TRIMESTER: ICD-10-CM

## 2021-05-13 PROCEDURE — 6370000000 HC RX 637 (ALT 250 FOR IP): Performed by: SURGERY

## 2021-05-13 PROCEDURE — 2720000010 HC SURG SUPPLY STERILE: Performed by: SURGERY

## 2021-05-13 PROCEDURE — 0502F SUBSEQUENT PRENATAL CARE: CPT | Performed by: ADVANCED PRACTICE MIDWIFE

## 2021-05-13 PROCEDURE — 2709999900 HC NON-CHARGEABLE SUPPLY: Performed by: SURGERY

## 2021-05-13 PROCEDURE — 46255 REMOVE INT/EXT HEM 1 GROUP: CPT | Performed by: SURGERY

## 2021-05-13 PROCEDURE — 3600000013 HC SURGERY LEVEL 3 ADDTL 15MIN: Performed by: SURGERY

## 2021-05-13 PROCEDURE — 2500000003 HC RX 250 WO HCPCS: Performed by: SURGERY

## 2021-05-13 PROCEDURE — 88304 TISSUE EXAM BY PATHOLOGIST: CPT

## 2021-05-13 PROCEDURE — 3600000003 HC SURGERY LEVEL 3 BASE: Performed by: SURGERY

## 2021-05-13 RX ORDER — DIAPER,BRIEF,INFANT-TODD,DISP
EACH MISCELLANEOUS PRN
Status: DISCONTINUED | OUTPATIENT
Start: 2021-05-13 | End: 2021-05-13 | Stop reason: ALTCHOICE

## 2021-05-13 RX ORDER — SODIUM CHLORIDE, SODIUM LACTATE, POTASSIUM CHLORIDE, CALCIUM CHLORIDE 600; 310; 30; 20 MG/100ML; MG/100ML; MG/100ML; MG/100ML
INJECTION, SOLUTION INTRAVENOUS CONTINUOUS
Status: DISCONTINUED | OUTPATIENT
Start: 2021-05-13 | End: 2021-05-13 | Stop reason: HOSPADM

## 2021-05-13 RX ORDER — CLINDAMYCIN HYDROCHLORIDE 300 MG/1
300 CAPSULE ORAL 4 TIMES DAILY
Qty: 28 CAPSULE | Refills: 0 | Status: ON HOLD | OUTPATIENT
Start: 2021-05-13 | End: 2021-05-20 | Stop reason: HOSPADM

## 2021-05-13 RX ORDER — BUPIVACAINE HYDROCHLORIDE AND EPINEPHRINE 2.5; 5 MG/ML; UG/ML
INJECTION, SOLUTION EPIDURAL; INFILTRATION; INTRACAUDAL; PERINEURAL PRN
Status: DISCONTINUED | OUTPATIENT
Start: 2021-05-13 | End: 2021-05-13 | Stop reason: ALTCHOICE

## 2021-05-13 ASSESSMENT — PAIN - FUNCTIONAL ASSESSMENT: PAIN_FUNCTIONAL_ASSESSMENT: 0-10

## 2021-05-13 NOTE — PROGRESS NOTES
Juice Kuo is here at 34w5d for:    Chief Complaint   Patient presents with    Routine Prenatal Visit     Follow up in house ultrasound. Estimated Due Date: Estimated Date of Delivery: 21    OB History    Para Term  AB Living   5 1 1   2 1   SAB TAB Ectopic Molar Multiple Live Births   2                # Outcome Date GA Lbr Luis/2nd Weight Sex Delivery Anes PTL Lv   5 Current            4 Term 10/29/13 39w2d  7 lb 9.2 oz (3.435 kg) M CS-Unspec      3 2012 6w0d          2 2011 6w0d          1                  Past Medical History:   Diagnosis Date    Kidney calculi        Past Surgical History:   Procedure Laterality Date     SECTION  10/29/2013    CHOLECYSTECTOMY      DILATION AND CURETTAGE OF UTERUS      DILATION AND CURETTAGE OF UTERUS N/A 2019    DILATATION AND CURETTAGE SUCTION performed by Ilir Whyte MD at [address] [place] [address] History     Tobacco Use   Smoking Status Never Smoker   Smokeless Tobacco Never Used        Social History     Substance and Sexual Activity   Alcohol Use No       No results found for this visit on 21. Vitals:  /80   Wt 211 lb (95.7 kg)   BMI 35.66 kg/m²   Estimated body mass index is 35.66 kg/m² as calculated from the following:    Height as of an earlier encounter on 21: 5' 4.5\" (1.638 m). Weight as of this encounter: 211 lb (95.7 kg). HPI: patient for routine ob visit, di di twins, iddm likely pregestational started on insulin in first trimester followed by endocrinology very stable on current dosing; had excisional biopsy of large thrombosed hemorrhoid today, feeling \"so much better\"     PT denies fever, chills, nausea and vomiting       Abdomen: enlarged, gravid         Results reviewed today:  Sono:  TWIN A   36.5WK IUP  EFW: 5lb 14oz, 57%  BPP: 8/8  CL: 1.75cm   MONROE: 4.1cm  UMB-S/D: 2.13  HR: 146bpm  Posterior placenta, cephalic presentation  Active fetal movements     TWIN B  36. 1WK IUP  EFW: 9jym2tc, 62%%  BPP: 8/8  CL: 1.7cm  MONROE: 5.1cm  HR: 144bpm  UMB-S/D: 2.98  posterior placenta, transverse presentation  Active fetal movements  No results found for this visit on 05/13/21. See prenatal vital sign section and fetal assessment section    ASSESSMENT & Plan    Diagnosis Orders   1. Hemorrhoid thrombosis  clindamycin (CLEOCIN) 300 MG capsule   2. Dichorionic diamniotic twin pregnancy in third trimester     3. 34 weeks gestation of pregnancy         patient had hemorrhoid surgery today, will cover with antibiotics after talking with Dr. Lev Reece      I am having Gerlupe Ramos. Bebo start on clindamycin. I am also having her maintain her Prenatal Vit-Fe Fumarate-FA (PRENATAL VITAMINS PO), blood glucose monitor supplies, metoclopramide, insulin glargine, Accu-Chek Softclix Lancets, Accu-Chek Dalia Plus, (Insulin Aspart, w/Niacinamide, (FIASP SC)), ondansetron, Breast Pump, ferrous sulfate, acetaminophen, butalbital-acetaminophen-caffeine, Hydrocort-Pramoxine (Perianal), and lidocaine. Return twice weekly bpps/dopplers and nst alternating. There are no Patient Instructions on file for this visit.           Silva Candelario,5/14/2021 11:30 AM

## 2021-05-13 NOTE — H&P
noncontributory  Cardiovascular:  Completed and, except as mentioned above, was negative or noncontributory  Gastrointestinal: Completed and, except as mentioned above, was negative or noncontributory  Genito-Urinary:  Completed and, except as mentioned above, was negative or noncontributory  Musculoskeletal:  Completed and, except as mentioned above, was negative or noncontributory  Neurological:  Completed and, except as mentioned above, was negative or noncontributory  Dermatological:  Completed and, except as mentioned above, was negative or noncontributory     Allergies: Adhesive tape and Sulfa antibiotics     Current Meds:  Current Medication   Current Outpatient Medications:     Hydrocort-Pramoxine, Perianal, (ANALPRAM-HC) 2.5-1 % rectal cream, Place rectally 3 times daily, Disp: 30 g, Rfl: 0    lidocaine (XYLOCAINE) 5 % ointment, Apply topically as needed every two hours, Disp: 1 Tube, Rfl: 1    Insulin Aspart, w/Niacinamide, (FIASP SC), Inject into the skin, Disp: , Rfl:     insulin glargine (LANTUS;BASAGLAR) 100 UNIT/ML injection pen, Inject into the skin, Disp: , Rfl:     Prenatal Vit-Fe Fumarate-FA (PRENATAL VITAMINS PO), Take by mouth, Disp: , Rfl:     butalbital-acetaminophen-caffeine (FIORICET, ESGIC) -40 MG per tablet, 1-2 tablets every 6 hours as needed for headache, no more than 6 tabs in 24 hours (Patient not taking: Reported on 4/26/2021), Disp: 30 tablet, Rfl: 1    ferrous sulfate (IRON 325) 325 (65 Fe) MG tablet, Take 325 mg by mouth daily (with breakfast), Disp: , Rfl:     acetaminophen (TYLENOL) 325 MG tablet, Take 650 mg by mouth every 6 hours as needed for Pain, Disp: , Rfl:     Misc.  Devices (BREAST PUMP) MISC, Double electric pump - plans breastfeeding (Patient not taking: Reported on 4/26/2021), Disp: 1 each, Rfl: 0    ondansetron (ZOFRAN) 4 MG tablet, Take 1 tablet by mouth every 8 hours as needed for Nausea or Vomiting (Patient not taking: Reported on 4/26/2021), Disp: 30 tablet, Rfl: 1    Accu-Chek Softclix Lancets MISC, TEST BLOOD SUGAR FASTING IN AM AND 1 HOUR POSTPRANDIAL 3 TIMES DAILY, Disp: , Rfl:     Blood Glucose Monitoring Suppl (ACCU-CHEK LIZZETTE PLUS) w/Device KIT, USE AS DIRECTED, Disp: , Rfl:     metoclopramide (REGLAN) 10 MG tablet, Take 1 tablet by mouth 3 times daily (with meals) (Patient not taking: Reported on 4/22/2021), Disp: 30 tablet, Rfl: 3    blood glucose monitor supplies, One glucometer, test strips, lancets - per insurance coverage Test blood sugar fasting in am and 1 hour postprandial three times daily, Disp: 150 each, Rfl: 4        Physical Exam:  Vital signs and Nurse's note reviewed. /77   Pulse 109   Temp 98.8 °F (37.1 °C)   Ht 5' 4\" (1.626 m)   Wt 211 lb 6.4 oz (95.9 kg)   BMI 36.29 kg/m²    height is 5' 4\" (1.626 m) and weight is 211 lb 6.4 oz (95.9 kg). Her temperature is 98.8 °F (37.1 °C). Her blood pressure is 107/77 and her pulse is 109. Gen:  A&Ox3, NAD. Pleasant and cooperative. HEENT: PERRLA, EOMI, no scleral icterus  Neck:  no goiter  CVS: Regular rate and rhythm  Resp: Good bilateral air entry, no active wheezing, no labored breathing  Abd: pregnant with twins due soon.  Rectal exam: large thrombosed hemorrhoid with severe ischemia and on a vascular stalk/pedicle, very tender to touch  Ext: Moves all extremities, no gross focal motor deficits  Skin: No erythema or ulcerations      Labs:         Lab Results   Component Value Date     WBC 11.1 04/13/2021     HGB 9.5 04/13/2021     HCT 30.3 04/13/2021     MCV 90.4 04/13/2021      04/13/2021            Lab Results   Component Value Date      04/13/2021     K 3.8 04/13/2021      04/13/2021     CO2 21 04/13/2021     BUN 10 04/13/2021     CREATININE 0.62 04/13/2021     GLUCOSE 83 05/03/2021     CALCIUM 8.9 04/13/2021            Lab Results   Component Value Date     ALKPHOS 121 04/13/2021     ALT 23 04/13/2021     AST 25 04/13/2021     PROT 6.2 04/13/2021     BILITOT 0.39 04/13/2021     LABALBU 2.9 04/13/2021      No results found for: AMYLASE  No results found for: LIPASE  No results found for: INR     Radiologic Studies:  Narrative   TWIN A   BPP:8 /8   HR: 146bpm   LARGEST FLUID POCKET: 0.0PR   Cephalic presentation, posterior placenta   Cord doppler - 2.14       TWIN B:    BPP: 8/8   HR: 153bpm   LARGEST FLUID POCKET: 5.7cm   Transverse presentation, posterior placenta   Cord doppler - 2.31       Cervical length: 1.8cm            Impressions/Recommendations:      Large thrombosed hemorrhoids with ischemia and on a vascular pedicle. Excise under local in the OR. Add on.     H&P  General Surgery        Pt Name: Pricilla Garcia  MRN: [id number]  YOB: 1985  Date of evaluation: 5/13/2021      [x] I have examined the patient and reviewed the H&P/Consult completed, and there are no changes to the patient or plans. [] I have examined the patient and reviewed the H&P/Consult and have noted the following changes: The patient was counseled at length about the risks of lynne Covid-19 during their perioperative period and any recovery window from their procedure. The patient was made aware that lynne Covid-19  may worsen their prognosis for recovering from their procedure  and lend to a higher morbidity and/or mortality risk. All material risks, benefits, and reasonable alternatives including postponing the procedure were discussed. The patient does wish to proceed with the procedure at this time.         Electronically signed by Marlin Chatman DO  on 5/13/2021 at 2:27 PM

## 2021-05-13 NOTE — PROGRESS NOTES
GENERAL SURGERY CONSULTATION      Patient's Name/ Date of Birth/ Gender: Pricilla Garcia / 1985 (38 y.o.) / female     PCP: UMSAN Cortez - CNP  Referring:  Rosette Jose CNM    History of present Illness:  Patient is a pleasant 38 y.o. female  kindly referred by Ms. Candelario  Pregnant with twins, severe rectal pain, bleeding from hemorrhoids, seen and examined. No fevers or chillls. Extremely uncomfortable. Past Medical History:  has a past medical history of Kidney calculi. Past Surgical History:   Past Surgical History:   Procedure Laterality Date     SECTION  10/29/2013    CHOLECYSTECTOMY      DILATION AND CURETTAGE OF UTERUS      DILATION AND CURETTAGE OF UTERUS N/A 2019    DILATATION AND CURETTAGE SUCTION performed by Gabino Whipple MD at [address] [place] [address] History:  reports that she has never smoked. She has never used smokeless tobacco. She reports that she does not drink alcohol or use drugs. Family History: family history includes Diabetes in her paternal grandmother; High Blood Pressure in her father.     Review of Systems:   General: Completed and, except as mentioned above, was negative or noncontributory  Psychological:  Completed and, except as mentioned above, was negative or noncontributory  Ophthalmic:  Completed and, except as mentioned above, was negative or noncontributory  ENT:  Completed and, except as mentioned above, was negative or noncontributory  Allergy and Immunology:  Completed and, except as mentioned above, was negative or noncontributory  Hematological and Lymphatic:  Completed and, except as mentioned above, was negative or noncontributory  Endocrine: Completed and, except as mentioned above, was negative or noncontributory  Breast:  Completed and, except as mentioned above, was negative or noncontributory  Respiratory:  Completed and, except as mentioned above, was negative or noncontributory  Cardiovascular:  Completed and, except as mentioned above, was negative or noncontributory  Gastrointestinal: Completed and, except as mentioned above, was negative or noncontributory  Genito-Urinary:  Completed and, except as mentioned above, was negative or noncontributory  Musculoskeletal:  Completed and, except as mentioned above, was negative or noncontributory  Neurological:  Completed and, except as mentioned above, was negative or noncontributory  Dermatological:  Completed and, except as mentioned above, was negative or noncontributory    Allergies: Adhesive tape and Sulfa antibiotics    Current Meds:  Current Outpatient Medications:     Hydrocort-Pramoxine, Perianal, (ANALPRAM-HC) 2.5-1 % rectal cream, Place rectally 3 times daily, Disp: 30 g, Rfl: 0    lidocaine (XYLOCAINE) 5 % ointment, Apply topically as needed every two hours, Disp: 1 Tube, Rfl: 1    Insulin Aspart, w/Niacinamide, (FIASP SC), Inject into the skin, Disp: , Rfl:     insulin glargine (LANTUS;BASAGLAR) 100 UNIT/ML injection pen, Inject into the skin, Disp: , Rfl:     Prenatal Vit-Fe Fumarate-FA (PRENATAL VITAMINS PO), Take by mouth, Disp: , Rfl:     butalbital-acetaminophen-caffeine (FIORICET, ESGIC) -40 MG per tablet, 1-2 tablets every 6 hours as needed for headache, no more than 6 tabs in 24 hours (Patient not taking: Reported on 4/26/2021), Disp: 30 tablet, Rfl: 1    ferrous sulfate (IRON 325) 325 (65 Fe) MG tablet, Take 325 mg by mouth daily (with breakfast), Disp: , Rfl:     acetaminophen (TYLENOL) 325 MG tablet, Take 650 mg by mouth every 6 hours as needed for Pain, Disp: , Rfl:     Misc.  Devices (BREAST PUMP) MISC, Double electric pump - plans breastfeeding (Patient not taking: Reported on 4/26/2021), Disp: 1 each, Rfl: 0    ondansetron (ZOFRAN) 4 MG tablet, Take 1 tablet by mouth every 8 hours as needed for Nausea or Vomiting (Patient not taking: Reported on 4/26/2021), Disp: 30 tablet, Rfl: 1    Accu-Chek Softclix Lancets MISC, TEST BLOOD SUGAR FASTING IN AM AND 1 HOUR POSTPRANDIAL 3 TIMES DAILY, Disp: , Rfl:     Blood Glucose Monitoring Suppl (ACCU-CHEK LIZZETTE PLUS) w/Device KIT, USE AS DIRECTED, Disp: , Rfl:     metoclopramide (REGLAN) 10 MG tablet, Take 1 tablet by mouth 3 times daily (with meals) (Patient not taking: Reported on 4/22/2021), Disp: 30 tablet, Rfl: 3    blood glucose monitor supplies, One glucometer, test strips, lancets - per insurance coverage Test blood sugar fasting in am and 1 hour postprandial three times daily, Disp: 150 each, Rfl: 4    Physical Exam:  Vital signs and Nurse's note reviewed. /77   Pulse 109   Temp 98.8 °F (37.1 °C)   Ht 5' 4\" (1.626 m)   Wt 211 lb 6.4 oz (95.9 kg)   BMI 36.29 kg/m²    height is 5' 4\" (1.626 m) and weight is 211 lb 6.4 oz (95.9 kg). Her temperature is 98.8 °F (37.1 °C). Her blood pressure is 107/77 and her pulse is 109. Gen:  A&Ox3, NAD. Pleasant and cooperative. HEENT: PERRLA, EOMI, no scleral icterus  Neck:  no goiter  CVS: Regular rate and rhythm  Resp: Good bilateral air entry, no active wheezing, no labored breathing  Abd: pregnant with twins due soon.  Rectal exam: large thrombosed hemorrhoid with severe ischemia and on a vascular stalk/pedicle, very tender to touch  Ext: Moves all extremities, no gross focal motor deficits  Skin: No erythema or ulcerations     Labs:   Lab Results   Component Value Date    WBC 11.1 04/13/2021    HGB 9.5 04/13/2021    HCT 30.3 04/13/2021    MCV 90.4 04/13/2021     04/13/2021     Lab Results   Component Value Date     04/13/2021    K 3.8 04/13/2021     04/13/2021    CO2 21 04/13/2021    BUN 10 04/13/2021    CREATININE 0.62 04/13/2021    GLUCOSE 83 05/03/2021    CALCIUM 8.9 04/13/2021     Lab Results   Component Value Date    ALKPHOS 121 04/13/2021    ALT 23 04/13/2021    AST 25 04/13/2021    PROT 6.2 04/13/2021    BILITOT 0.39 04/13/2021    LABALBU 2.9 04/13/2021     No results found for: AMYLASE  No results found for: LIPASE  No results found for:

## 2021-05-14 NOTE — BRIEF OP NOTE
Brief Postoperative Note      Patient: Najma Geller  YOB: 1985  MRN: [id number]   USMAN Harley - CNP  Referring: Daniela Brewer CNM    Date of Procedure: 5/13/2021    Pre-Op Diagnosis: large thrombosed internal hemorrhoid with severe ischemia    Post-Op Diagnosis: Same       Procedure(s):  Excisional biopsy large ischemic thrombosed internal hemorrhoid    Surgeon(s):  Mackenzie Winters DO    Anesthesia: Local    Estimated Blood Loss (mL): 5 ml    Complications: None    Specimens:   ID Type Source Tests Collected by Time Destination   A :  Tissue Anus SURGICAL PATHOLOGY Mackenzie Winters DO 5/13/2021 1516          Electronically signed by Mackenzie Winters DO, FACOS, FACS on 5/13/2021 at 10:31 PM

## 2021-05-17 ENCOUNTER — ROUTINE PRENATAL (OUTPATIENT)
Dept: OBGYN | Age: 36
End: 2021-05-17
Payer: COMMERCIAL

## 2021-05-17 ENCOUNTER — ANESTHESIA (OUTPATIENT)
Dept: LABOR AND DELIVERY | Age: 36
End: 2021-05-17
Payer: COMMERCIAL

## 2021-05-17 ENCOUNTER — HOSPITAL ENCOUNTER (OUTPATIENT)
Age: 36
Discharge: HOME | End: 2021-05-17
Payer: COMMERCIAL

## 2021-05-17 ENCOUNTER — ANESTHESIA EVENT (OUTPATIENT)
Dept: LABOR AND DELIVERY | Age: 36
End: 2021-05-17
Payer: COMMERCIAL

## 2021-05-17 ENCOUNTER — HOSPITAL ENCOUNTER (INPATIENT)
Age: 36
LOS: 3 days | Discharge: HOME | End: 2021-05-20
Attending: OBSTETRICS & GYNECOLOGY | Admitting: OBSTETRICS & GYNECOLOGY
Payer: COMMERCIAL

## 2021-05-17 VITALS — WEIGHT: 211 LBS | BODY MASS INDEX: 35.66 KG/M2 | SYSTOLIC BLOOD PRESSURE: 134 MMHG | DIASTOLIC BLOOD PRESSURE: 84 MMHG

## 2021-05-17 VITALS
DIASTOLIC BLOOD PRESSURE: 49 MMHG | TEMPERATURE: 98.6 F | RESPIRATION RATE: 13 BRPM | SYSTOLIC BLOOD PRESSURE: 131 MMHG | OXYGEN SATURATION: 98 %

## 2021-05-17 DIAGNOSIS — O30.043 DICHORIONIC DIAMNIOTIC TWIN PREGNANCY IN THIRD TRIMESTER: ICD-10-CM

## 2021-05-17 DIAGNOSIS — Z3A.35 35 WEEKS GESTATION OF PREGNANCY: Primary | ICD-10-CM

## 2021-05-17 LAB
ABO/RH: NORMAL
ANTIBODY SCREEN: NEGATIVE
ARM BAND NUMBER: NORMAL
EXPIRATION DATE: NORMAL
GLUCOSE TOLERANCE TEST 2 HOUR: 129 MG/DL (ref 60–140)
HCT VFR BLD CALC: 35.6 % (ref 36.3–47.1)
HEMOGLOBIN: 11.4 G/DL (ref 11.9–15.1)
MCH RBC QN AUTO: 29.2 PG (ref 25.2–33.5)
MCHC RBC AUTO-ENTMCNC: 32 G/DL (ref 28.4–34.8)
MCV RBC AUTO: 91.3 FL (ref 82.6–102.9)
NRBC AUTOMATED: 0 PER 100 WBC
PDW BLD-RTO: 17.2 % (ref 11.8–14.4)
PLATELET # BLD: 180 K/UL (ref 138–453)
PMV BLD AUTO: 12.4 FL (ref 8.1–13.5)
RBC # BLD: 3.9 M/UL (ref 3.95–5.11)
SURGICAL PATHOLOGY REPORT: NORMAL
WBC # BLD: 10.1 K/UL (ref 3.5–11.3)

## 2021-05-17 PROCEDURE — 59510 CESAREAN DELIVERY: CPT | Performed by: OBSTETRICS & GYNECOLOGY

## 2021-05-17 PROCEDURE — 82985 ASSAY OF GLYCATED PROTEIN: CPT

## 2021-05-17 PROCEDURE — 6360000002 HC RX W HCPCS

## 2021-05-17 PROCEDURE — 2720000010 HC SURG SUPPLY STERILE: Performed by: OBSTETRICS & GYNECOLOGY

## 2021-05-17 PROCEDURE — 0502F SUBSEQUENT PRENATAL CARE: CPT | Performed by: ADVANCED PRACTICE MIDWIFE

## 2021-05-17 PROCEDURE — 3609079900 HC CESAREAN SECTION: Performed by: OBSTETRICS & GYNECOLOGY

## 2021-05-17 PROCEDURE — 86850 RBC ANTIBODY SCREEN: CPT

## 2021-05-17 PROCEDURE — 86901 BLOOD TYPING SEROLOGIC RH(D): CPT

## 2021-05-17 PROCEDURE — 36415 COLL VENOUS BLD VENIPUNCTURE: CPT

## 2021-05-17 PROCEDURE — 59025 FETAL NON-STRESS TEST: CPT | Performed by: ADVANCED PRACTICE MIDWIFE

## 2021-05-17 PROCEDURE — 2580000003 HC RX 258: Performed by: ADVANCED PRACTICE MIDWIFE

## 2021-05-17 PROCEDURE — 6360000002 HC RX W HCPCS: Performed by: ADVANCED PRACTICE MIDWIFE

## 2021-05-17 PROCEDURE — 88307 TISSUE EXAM BY PATHOLOGIST: CPT

## 2021-05-17 PROCEDURE — 6370000000 HC RX 637 (ALT 250 FOR IP): Performed by: ADVANCED PRACTICE MIDWIFE

## 2021-05-17 PROCEDURE — 59514 CESAREAN DELIVERY ONLY: CPT | Performed by: ADVANCED PRACTICE MIDWIFE

## 2021-05-17 PROCEDURE — 2580000003 HC RX 258: Performed by: OBSTETRICS & GYNECOLOGY

## 2021-05-17 PROCEDURE — 6360000002 HC RX W HCPCS: Performed by: NURSE ANESTHETIST, CERTIFIED REGISTERED

## 2021-05-17 PROCEDURE — 3700000001 HC ADD 15 MINUTES (ANESTHESIA): Performed by: OBSTETRICS & GYNECOLOGY

## 2021-05-17 PROCEDURE — 64488 TAP BLOCK BI INJECTION: CPT | Performed by: NURSE ANESTHETIST, CERTIFIED REGISTERED

## 2021-05-17 PROCEDURE — 7100000001 HC PACU RECOVERY - ADDTL 15 MIN: Performed by: OBSTETRICS & GYNECOLOGY

## 2021-05-17 PROCEDURE — 3700000000 HC ANESTHESIA ATTENDED CARE: Performed by: OBSTETRICS & GYNECOLOGY

## 2021-05-17 PROCEDURE — 7100000000 HC PACU RECOVERY - FIRST 15 MIN: Performed by: OBSTETRICS & GYNECOLOGY

## 2021-05-17 PROCEDURE — 82947 ASSAY GLUCOSE BLOOD QUANT: CPT

## 2021-05-17 PROCEDURE — 86900 BLOOD TYPING SEROLOGIC ABO: CPT

## 2021-05-17 PROCEDURE — 85027 COMPLETE CBC AUTOMATED: CPT

## 2021-05-17 PROCEDURE — 2500000003 HC RX 250 WO HCPCS: Performed by: ADVANCED PRACTICE MIDWIFE

## 2021-05-17 PROCEDURE — 6370000000 HC RX 637 (ALT 250 FOR IP): Performed by: OBSTETRICS & GYNECOLOGY

## 2021-05-17 PROCEDURE — 2709999900 HC NON-CHARGEABLE SUPPLY: Performed by: OBSTETRICS & GYNECOLOGY

## 2021-05-17 PROCEDURE — 1220000000 HC SEMI PRIVATE OB R&B

## 2021-05-17 PROCEDURE — 6360000002 HC RX W HCPCS: Performed by: OBSTETRICS & GYNECOLOGY

## 2021-05-17 RX ORDER — PRENATAL WITH FERROUS FUM AND FOLIC ACID 3080; 920; 120; 400; 22; 1.84; 3; 20; 10; 1; 12; 200; 27; 25; 2 [IU]/1; [IU]/1; MG/1; [IU]/1; MG/1; MG/1; MG/1; MG/1; MG/1; MG/1; UG/1; MG/1; MG/1; MG/1; MG/1
1 TABLET ORAL DAILY
Status: DISCONTINUED | OUTPATIENT
Start: 2021-05-17 | End: 2021-05-20 | Stop reason: HOSPADM

## 2021-05-17 RX ORDER — SODIUM CHLORIDE 0.9 % (FLUSH) 0.9 %
10 SYRINGE (ML) INJECTION PRN
Status: DISCONTINUED | OUTPATIENT
Start: 2021-05-17 | End: 2021-05-17

## 2021-05-17 RX ORDER — METHYLERGONOVINE MALEATE 0.2 MG/ML
200 INJECTION INTRAVENOUS PRN
Status: DISCONTINUED | OUTPATIENT
Start: 2021-05-17 | End: 2021-05-20 | Stop reason: HOSPADM

## 2021-05-17 RX ORDER — SODIUM CHLORIDE 0.9 % (FLUSH) 0.9 %
10 SYRINGE (ML) INJECTION EVERY 12 HOURS SCHEDULED
Status: DISCONTINUED | OUTPATIENT
Start: 2021-05-17 | End: 2021-05-17

## 2021-05-17 RX ORDER — CARBOPROST TROMETHAMINE 250 UG/ML
250 INJECTION, SOLUTION INTRAMUSCULAR PRN
Status: DISCONTINUED | OUTPATIENT
Start: 2021-05-17 | End: 2021-05-20 | Stop reason: HOSPADM

## 2021-05-17 RX ORDER — OXYCODONE HYDROCHLORIDE AND ACETAMINOPHEN 5; 325 MG/1; MG/1
2 TABLET ORAL EVERY 4 HOURS PRN
Status: DISCONTINUED | OUTPATIENT
Start: 2021-05-17 | End: 2021-05-20 | Stop reason: HOSPADM

## 2021-05-17 RX ORDER — NALBUPHINE HCL 10 MG/ML
10 AMPUL (ML) INJECTION EVERY 4 HOURS PRN
Status: DISCONTINUED | OUTPATIENT
Start: 2021-05-17 | End: 2021-05-20 | Stop reason: HOSPADM

## 2021-05-17 RX ORDER — DIPHENHYDRAMINE HYDROCHLORIDE 50 MG/ML
25 INJECTION INTRAMUSCULAR; INTRAVENOUS EVERY 6 HOURS PRN
Status: DISCONTINUED | OUTPATIENT
Start: 2021-05-17 | End: 2021-05-20 | Stop reason: HOSPADM

## 2021-05-17 RX ORDER — ONDANSETRON 2 MG/ML
INJECTION INTRAMUSCULAR; INTRAVENOUS PRN
Status: DISCONTINUED | OUTPATIENT
Start: 2021-05-17 | End: 2021-05-17 | Stop reason: SDUPTHER

## 2021-05-17 RX ORDER — SODIUM CHLORIDE 0.9 % (FLUSH) 0.9 %
10 SYRINGE (ML) INJECTION PRN
Status: DISCONTINUED | OUTPATIENT
Start: 2021-05-17 | End: 2021-05-20 | Stop reason: HOSPADM

## 2021-05-17 RX ORDER — IBUPROFEN 800 MG/1
800 TABLET ORAL EVERY 8 HOURS
Status: DISCONTINUED | OUTPATIENT
Start: 2021-05-18 | End: 2021-05-20 | Stop reason: HOSPADM

## 2021-05-17 RX ORDER — MODIFIED LANOLIN
OINTMENT (GRAM) TOPICAL
Status: DISCONTINUED | OUTPATIENT
Start: 2021-05-17 | End: 2021-05-20 | Stop reason: HOSPADM

## 2021-05-17 RX ORDER — SODIUM CHLORIDE 0.9 % (FLUSH) 0.9 %
10 SYRINGE (ML) INJECTION EVERY 12 HOURS SCHEDULED
Status: DISCONTINUED | OUTPATIENT
Start: 2021-05-17 | End: 2021-05-20 | Stop reason: HOSPADM

## 2021-05-17 RX ORDER — NALOXONE HYDROCHLORIDE 0.4 MG/ML
0.4 INJECTION, SOLUTION INTRAMUSCULAR; INTRAVENOUS; SUBCUTANEOUS PRN
Status: DISCONTINUED | OUTPATIENT
Start: 2021-05-17 | End: 2021-05-20 | Stop reason: HOSPADM

## 2021-05-17 RX ORDER — METOCLOPRAMIDE HYDROCHLORIDE 5 MG/ML
10 INJECTION INTRAMUSCULAR; INTRAVENOUS ONCE
Status: COMPLETED | OUTPATIENT
Start: 2021-05-17 | End: 2021-05-17

## 2021-05-17 RX ORDER — FENTANYL CITRATE 50 UG/ML
INJECTION, SOLUTION INTRAMUSCULAR; INTRAVENOUS PRN
Status: DISCONTINUED | OUTPATIENT
Start: 2021-05-17 | End: 2021-05-17 | Stop reason: SDUPTHER

## 2021-05-17 RX ORDER — SODIUM CHLORIDE 9 MG/ML
25 INJECTION, SOLUTION INTRAVENOUS PRN
Status: DISCONTINUED | OUTPATIENT
Start: 2021-05-17 | End: 2021-05-17

## 2021-05-17 RX ORDER — SIMETHICONE 80 MG
80 TABLET,CHEWABLE ORAL EVERY 6 HOURS PRN
Status: DISCONTINUED | OUTPATIENT
Start: 2021-05-17 | End: 2021-05-20 | Stop reason: HOSPADM

## 2021-05-17 RX ORDER — OXYTOCIN 10 [USP'U]/ML
INJECTION, SOLUTION INTRAMUSCULAR; INTRAVENOUS
Status: COMPLETED
Start: 2021-05-17 | End: 2021-05-17

## 2021-05-17 RX ORDER — ROPIVACAINE HYDROCHLORIDE 5 MG/ML
INJECTION, SOLUTION EPIDURAL; INFILTRATION; PERINEURAL PRN
Status: DISCONTINUED | OUTPATIENT
Start: 2021-05-17 | End: 2021-05-17 | Stop reason: SDUPTHER

## 2021-05-17 RX ORDER — KETOROLAC TROMETHAMINE 30 MG/ML
30 INJECTION, SOLUTION INTRAMUSCULAR; INTRAVENOUS EVERY 6 HOURS
Status: COMPLETED | OUTPATIENT
Start: 2021-05-17 | End: 2021-05-18

## 2021-05-17 RX ORDER — TERBUTALINE SULFATE 1 MG/ML
INJECTION, SOLUTION SUBCUTANEOUS
Status: COMPLETED
Start: 2021-05-17 | End: 2021-05-17

## 2021-05-17 RX ORDER — SENNA AND DOCUSATE SODIUM 50; 8.6 MG/1; MG/1
1 TABLET, FILM COATED ORAL DAILY PRN
Status: DISCONTINUED | OUTPATIENT
Start: 2021-05-17 | End: 2021-05-20 | Stop reason: HOSPADM

## 2021-05-17 RX ORDER — TERBUTALINE SULFATE 1 MG/ML
0.25 INJECTION, SOLUTION SUBCUTANEOUS ONCE
Status: COMPLETED | OUTPATIENT
Start: 2021-05-17 | End: 2021-05-17

## 2021-05-17 RX ORDER — SODIUM CHLORIDE, SODIUM LACTATE, POTASSIUM CHLORIDE, CALCIUM CHLORIDE 600; 310; 30; 20 MG/100ML; MG/100ML; MG/100ML; MG/100ML
1000 INJECTION, SOLUTION INTRAVENOUS ONCE
Status: COMPLETED | OUTPATIENT
Start: 2021-05-17 | End: 2021-05-17

## 2021-05-17 RX ORDER — KETOROLAC TROMETHAMINE 30 MG/ML
INJECTION, SOLUTION INTRAMUSCULAR; INTRAVENOUS PRN
Status: DISCONTINUED | OUTPATIENT
Start: 2021-05-17 | End: 2021-05-17 | Stop reason: SDUPTHER

## 2021-05-17 RX ORDER — SODIUM CHLORIDE 9 MG/ML
25 INJECTION, SOLUTION INTRAVENOUS PRN
Status: DISCONTINUED | OUTPATIENT
Start: 2021-05-17 | End: 2021-05-20 | Stop reason: HOSPADM

## 2021-05-17 RX ORDER — ACETAMINOPHEN 325 MG/1
650 TABLET ORAL EVERY 4 HOURS PRN
Status: DISCONTINUED | OUTPATIENT
Start: 2021-05-17 | End: 2021-05-20 | Stop reason: HOSPADM

## 2021-05-17 RX ORDER — DOCUSATE SODIUM 100 MG/1
100 CAPSULE, LIQUID FILLED ORAL 2 TIMES DAILY
Status: DISCONTINUED | OUTPATIENT
Start: 2021-05-17 | End: 2021-05-20 | Stop reason: HOSPADM

## 2021-05-17 RX ORDER — SODIUM CHLORIDE, SODIUM LACTATE, POTASSIUM CHLORIDE, CALCIUM CHLORIDE 600; 310; 30; 20 MG/100ML; MG/100ML; MG/100ML; MG/100ML
INJECTION, SOLUTION INTRAVENOUS CONTINUOUS
Status: DISCONTINUED | OUTPATIENT
Start: 2021-05-17 | End: 2021-05-17

## 2021-05-17 RX ORDER — OXYCODONE HYDROCHLORIDE AND ACETAMINOPHEN 5; 325 MG/1; MG/1
1 TABLET ORAL EVERY 4 HOURS PRN
Status: DISCONTINUED | OUTPATIENT
Start: 2021-05-17 | End: 2021-05-20 | Stop reason: HOSPADM

## 2021-05-17 RX ORDER — OXYTOCIN 10 [USP'U]/ML
INJECTION, SOLUTION INTRAMUSCULAR; INTRAVENOUS PRN
Status: DISCONTINUED | OUTPATIENT
Start: 2021-05-17 | End: 2021-05-17 | Stop reason: SDUPTHER

## 2021-05-17 RX ORDER — DEXAMETHASONE SODIUM PHOSPHATE 10 MG/ML
INJECTION, SOLUTION INTRAMUSCULAR; INTRAVENOUS PRN
Status: DISCONTINUED | OUTPATIENT
Start: 2021-05-17 | End: 2021-05-17 | Stop reason: SDUPTHER

## 2021-05-17 RX ORDER — MISOPROSTOL 100 UG/1
800 TABLET ORAL PRN
Status: DISCONTINUED | OUTPATIENT
Start: 2021-05-17 | End: 2021-05-20 | Stop reason: HOSPADM

## 2021-05-17 RX ORDER — SODIUM CHLORIDE, SODIUM LACTATE, POTASSIUM CHLORIDE, CALCIUM CHLORIDE 600; 310; 30; 20 MG/100ML; MG/100ML; MG/100ML; MG/100ML
INJECTION, SOLUTION INTRAVENOUS CONTINUOUS
Status: DISCONTINUED | OUTPATIENT
Start: 2021-05-17 | End: 2021-05-20 | Stop reason: HOSPADM

## 2021-05-17 RX ORDER — TRISODIUM CITRATE DIHYDRATE AND CITRIC ACID MONOHYDRATE 500; 334 MG/5ML; MG/5ML
30 SOLUTION ORAL ONCE
Status: COMPLETED | OUTPATIENT
Start: 2021-05-17 | End: 2021-05-17

## 2021-05-17 RX ORDER — SODIUM CHLORIDE 9 MG/ML
INJECTION, SOLUTION INTRAVENOUS
Status: DISCONTINUED
Start: 2021-05-17 | End: 2021-05-17

## 2021-05-17 RX ADMIN — FAMOTIDINE 20 MG: 10 INJECTION, SOLUTION INTRAVENOUS at 17:38

## 2021-05-17 RX ADMIN — SODIUM CHLORIDE, POTASSIUM CHLORIDE, SODIUM LACTATE AND CALCIUM CHLORIDE: 600; 310; 30; 20 INJECTION, SOLUTION INTRAVENOUS at 22:22

## 2021-05-17 RX ADMIN — OXYCODONE HYDROCHLORIDE AND ACETAMINOPHEN 1 TABLET: 5; 325 TABLET ORAL at 20:52

## 2021-05-17 RX ADMIN — PHENYLEPHRINE HYDROCHLORIDE 150 MCG: 10 INJECTION INTRAVENOUS at 17:48

## 2021-05-17 RX ADMIN — SODIUM CHLORIDE, POTASSIUM CHLORIDE, SODIUM LACTATE AND CALCIUM CHLORIDE: 600; 310; 30; 20 INJECTION, SOLUTION INTRAVENOUS at 17:59

## 2021-05-17 RX ADMIN — SODIUM CHLORIDE, POTASSIUM CHLORIDE, SODIUM LACTATE AND CALCIUM CHLORIDE: 600; 310; 30; 20 INJECTION, SOLUTION INTRAVENOUS at 19:15

## 2021-05-17 RX ADMIN — SODIUM CHLORIDE, POTASSIUM CHLORIDE, SODIUM LACTATE AND CALCIUM CHLORIDE: 600; 310; 30; 20 INJECTION, SOLUTION INTRAVENOUS at 17:31

## 2021-05-17 RX ADMIN — TERBUTALINE SULFATE 0.25 MG: 1 INJECTION, SOLUTION SUBCUTANEOUS at 17:27

## 2021-05-17 RX ADMIN — OXYTOCIN 30 UNITS: 10 INJECTION INTRAVENOUS at 18:05

## 2021-05-17 RX ADMIN — KETOROLAC TROMETHAMINE 30 MG: 30 INJECTION, SOLUTION INTRAMUSCULAR at 18:34

## 2021-05-17 RX ADMIN — FENTANYL CITRATE 50 MCG: 50 INJECTION, SOLUTION INTRAMUSCULAR; INTRAVENOUS at 18:24

## 2021-05-17 RX ADMIN — FENTANYL CITRATE 50 MCG: 50 INJECTION, SOLUTION INTRAMUSCULAR; INTRAVENOUS at 18:21

## 2021-05-17 RX ADMIN — METOCLOPRAMIDE 10 MG: 5 INJECTION, SOLUTION INTRAMUSCULAR; INTRAVENOUS at 17:39

## 2021-05-17 RX ADMIN — PHENYLEPHRINE HYDROCHLORIDE 100 MCG: 10 INJECTION INTRAVENOUS at 17:52

## 2021-05-17 RX ADMIN — DEXAMETHASONE SODIUM PHOSPHATE 10 MG: 10 INJECTION, SOLUTION INTRAMUSCULAR; INTRAVENOUS at 18:46

## 2021-05-17 RX ADMIN — PHENYLEPHRINE HYDROCHLORIDE 100 MCG: 10 INJECTION INTRAVENOUS at 18:04

## 2021-05-17 RX ADMIN — PHENYLEPHRINE HYDROCHLORIDE 150 MCG: 10 INJECTION INTRAVENOUS at 17:43

## 2021-05-17 RX ADMIN — OXYTOCIN 87.3 MILLI-UNITS/MIN: 10 INJECTION INTRAVENOUS at 19:15

## 2021-05-17 RX ADMIN — CEFOXITIN SODIUM 2000 MG: 2 POWDER, FOR SOLUTION INTRAVENOUS at 17:39

## 2021-05-17 RX ADMIN — ROPIVACAINE HYDROCHLORIDE 55 ML: 5 INJECTION, SOLUTION EPIDURAL; INFILTRATION; PERINEURAL at 18:46

## 2021-05-17 RX ADMIN — ONDANSETRON 4 MG: 2 INJECTION INTRAMUSCULAR; INTRAVENOUS at 17:59

## 2021-05-17 RX ADMIN — SODIUM CITRATE AND CITRIC ACID MONOHYDRATE 30 ML: 500; 334 SOLUTION ORAL at 17:28

## 2021-05-17 ASSESSMENT — PAIN SCALES - GENERAL: PAINLEVEL_OUTOF10: 4

## 2021-05-17 NOTE — ANESTHESIA PROCEDURE NOTES
Peripheral Block    Patient location during procedure: OR  Start time: 5/17/2021 6:40 PM  End time: 5/17/2021 6:46 PM  Staffing  Resident/CRNA: USMAN Narvaez CRNA  Preanesthetic Checklist  Completed: patient identified, IV checked, site marked, risks and benefits discussed, surgical consent, monitors and equipment checked, pre-op evaluation, timeout performed, anesthesia consent given, oxygen available and patient being monitored  Peripheral Block  Patient position: supine  Prep: ChloraPrep  Patient monitoring: continuous pulse ox, frequent blood pressure checks, IV access and cardiac monitor  Block type: TAP  Laterality: bilateral  Injection technique: single-shot  Guidance: ultrasound guided  Local infiltration: ropivacaine and decadron (See MAR for details.)  Infiltration strength: 0.5 %  Dose: 55 mL  Provider prep: mask and sterile gloves  Local infiltration: ropivacaine and decadron (See MAR for details.)  Needle  Needle type:  Other   Needle gauge: 20 G  Needle length: 11 cm  Needle localization: ultrasound guidanceOther needle type: pajunk  Assessment  Injection assessment: negative aspiration for heme, no paresthesia on injection and local visualized surrounding nerve on ultrasound  Paresthesia pain: none  Slow fractionated injection: yes  Hemodynamics: stable  Reason for block: post-op pain management and at surgeon's request

## 2021-05-17 NOTE — OP NOTE
Department of Obstetrics and Gynecology   Section Note    Indications: malpresentation - twin b transverse; prior  section; didelphys uterus; srom with active labor; insulin requiring gestational diabetes    Pre-operative Diagnosis: 35 week 2 day pregnancy. Post-operative Diagnosis: Living  infant(s), Male and Female    Surgeon: Lenetta Nissen, DO     Assistants: Aaliyah Mullen    Anesthesia: Spinal anesthesia    ASA Class: 2    Procedure Details   The patient was seen in the Holding Room. The risks, benefits, complications, treatment options, and expected outcomes were discussed with the patient. The patient concurred with the proposed plan, giving informed consent. The site of surgery properly noted/marked. The patient was taken to Operating Room # 1, identified as Anastasia Donis and the procedure verified as  Delivery. A Time Out was held and the above information confirmed. After induction of anesthesia, the patient was draped and prepped in the usual sterile manner. A Pfannenstiel incision was made and carried down through the subcutaneous tissue to the fascia. Fascial incision was made and extended transversely. The fascia was  from the underlying rectus tissue superiorly and inferiorly. The peritoneum was identified and entered. Peritoneal incision was extended longitudinally. A low transverse uterine incision was made. Delivered from vertex presentation was a viable infant with Apgar scores of 8 at one minute and 9 at five minutes. After the umbilical cord was clamped and cut the second baby was brought from transverse to vertex presentation and delivered atraumatically. Two samples of cord blood were obtained for evaluation. The placenta was removed intact and appeared normal. The uterus was exteriorized. The uterine outline was consistent with a known didelphys uterus.   There was evidence of endometriosis on the posterior uterine serosa and the tubes and ovaries in the form of clear cystic lesions and filmy adhesions. The uterine incision was closed with running locked sutures of 0 Vicryl. Hemostasis was observed. The uterus was returned to the abdomen. The gutters were cleared of all clot and debris. Hemostasis was assure. The peritoneum was closed with 3-0 Vicryl. The fascia was then reapproximated with running sutures of 0 Vicryl. The subcutaneous layer was closed with 3-0 Vicryl. The skin was reapproximated with 4-0 Monocryl. Instrument, sponge, and needle counts were correct prior the abdominal closure and at the conclusion of the case. Findings: Didelphys uterus, endometriosis on posterior uterine serosa, tubes, and ovaries    Urine: 150 ml clera    EBL: as per nursing documention         Fluids: as per anesthesia record                Specimens: placenta, cord blood                  Complications:  none                 Condition: infants stable to general nursery and mother stable    Attending Attestation: I was present and scrubbed for the entire procedure.

## 2021-05-17 NOTE — H&P
HISTORY AND PHYSICAL             Date: 2021        Patient Name: Nathaniel Shultz     YOB: 1985      Age:  38 y.o. Chief Complaint   No chief complaint on file. History Obtained From   patient    History of Present Illness    at 35 wks 2 days with Di/Di twins who presents with SROM in active labor. Pregnancy complicated gestational diabetes on insulin    Past Medical History     Past Medical History:   Diagnosis Date    Kidney calculi         Past Surgical History     Past Surgical History:   Procedure Laterality Date     SECTION  10/29/2013    CHOLECYSTECTOMY      DILATION AND CURETTAGE OF UTERUS      DILATION AND CURETTAGE OF UTERUS N/A 2019    DILATATION AND CURETTAGE SUCTION performed by Vannessa Hoffmann MD at [address] 2021    HEMORRHOIDECTOMY EXCISION [address] performed by Aimee Earl DO at [address]        Medications Prior to Admission     Prior to Admission medications    Medication Sig Start Date End Date Taking?  Authorizing Provider   clindamycin (CLEOCIN) 300 MG capsule Take 1 capsule by mouth 4 times daily for 7 days 21  USMAN Loyd CNM   Hydrocort-Pramoxine, Perianal, [facility]) 2.5-1 % rectal cream Place rectally 3 times daily 5/10/21   USMAN Loyd CNM   lidocaine (XYLOCAINE) 5 % ointment Apply topically as needed every two hours 5/10/21   USMAN Loyd CNM   butalbital-acetaminophen-caffeine (FIORICET, ESGIC) -14 MG per tablet 1-2 tablets every 6 hours as needed for headache, no more than 6 tabs in 24 hours  Patient not taking: Reported on 2021   USMAN Loyd CNM   ferrous sulfate (IRON 325) 325 (65 Fe) MG tablet Take 325 mg by mouth daily (with breakfast)    Historical Provider, MD   acetaminophen (TYLENOL) 325 MG tablet Take 650 mg by mouth every 6 hours as needed for Pain    Historical Provider, discharge (clear fluid). Physical Exam   Temp 97.6 °F (36.4 °C) (Oral)   Resp 18     Physical Exam  Constitutional:       Appearance: Normal appearance. HENT:      Head: Normocephalic and atraumatic. Eyes:      Extraocular Movements: Extraocular movements intact. Pupils: Pupils are equal, round, and reactive to light. Cardiovascular:      Rate and Rhythm: Normal rate. Pulmonary:      Effort: Pulmonary effort is normal.   Musculoskeletal:         General: Normal range of motion. Cervical back: Normal range of motion. Skin:     General: Skin is warm and dry. Neurological:      Mental Status: She is alert and oriented to person, place, and time. Psychiatric:         Mood and Affect: Mood normal.         Behavior: Behavior normal.         Thought Content: Thought content normal.         Judgment: Judgment normal.         Labs      Recent Results (from the past 24 hour(s))   Glucose 2 hour pp    Collection Time: 05/17/21  1:42 PM   Result Value Ref Range    Glucose, GTT - 2 Hour 129 60 - 140 mg/dL        Imaging/Diagnostics Last 24 Hours   No results found. Assessment    IUP at 35 weeks  Di/Di twins  SROM   Active labor    Plan   1.  Primary section    Consultations Ordered:  None    Electronically signed by Chilo Colon DO on 5/17/21 at 5:24 PM EDT

## 2021-05-17 NOTE — PROGRESS NOTES
Jonathan Boston is here at 35w2d for:    Chief Complaint   Patient presents with    Routine Prenatal Visit     Follow up in house NST. Estimated Due Date: Estimated Date of Delivery: 21    OB History    Para Term  AB Living   5 1 1   2 1   SAB TAB Ectopic Molar Multiple Live Births   2                # Outcome Date GA Lbr Luis/2nd Weight Sex Delivery Anes PTL Lv   5 Current            4 Term 10/29/13 39w2d  7 lb 9.2 oz (3.435 kg) M CS-Unspec      3 2012 6w0d          2 2011 6w0d          1                  Past Medical History:   Diagnosis Date    Kidney calculi        Past Surgical History:   Procedure Laterality Date     SECTION  10/29/2013    CHOLECYSTECTOMY      DILATION AND CURETTAGE OF UTERUS      DILATION AND CURETTAGE OF UTERUS N/A 2019    DILATATION AND CURETTAGE SUCTION performed by Peter Tavera MD at [address] N/A 2021    HEMORRHOIDECTOMY EXCISION [place] [address] performed by Tye Trimble DO at [facility] History     Tobacco Use   Smoking Status Never Smoker   Smokeless Tobacco Never Used        Social History     Substance and Sexual Activity   Alcohol Use No       No results found for this visit on 21. Vitals:  /84   Wt 211 lb (95.7 kg)   BMI 35.66 kg/m²   Estimated body mass index is 35.66 kg/m² as calculated from the following:    Height as of 21: 5' 4.5\" (1.638 m). Weight as of this encounter: 211 lb (95.7 kg). HPI: here for routine ob visit and fetal surveillance, sugars still well controlled with care by Dr. Sommer Shelby, hemorrhoidectomy is healing well     PT denies fever, chills, nausea and vomiting       Abdomen: enlarged, gravid, soft, nontender     Hemorrhoid site without signs of infection  Results reviewed today:  NST reactive x2    No results found for this visit on 21.     See prenatal vital sign section and fetal assessment

## 2021-05-17 NOTE — ANESTHESIA PROCEDURE NOTES
Spinal Block    Patient location during procedure: OR  Start time: 5/17/2021 5:35 PM  End time: 5/17/2021 5:43 PM  Reason for block: primary anesthetic  Staffing  Performed: resident/CRNA   Resident/CRNA: USMAN Johnson CRNA  Preanesthetic Checklist  Completed: patient identified, IV checked, site marked, risks and benefits discussed, surgical consent, monitors and equipment checked, pre-op evaluation, timeout performed, anesthesia consent given, oxygen available and patient being monitored  Spinal Block  Patient position: sitting  Prep: ChloraPrep and site prepped and draped  Patient monitoring: continuous pulse ox and frequent blood pressure checks  Approach: midline  Location: L4/L5  Guidance: anatomy guided.   Provider prep: mask and sterile gloves  Local infiltration: lidocaine  Agent: bupivacaine  Dose: 2  Dose: 2  Needle  Needle type: Pencan   Needle gauge: 25 G  Needle length: 3.5 in  Kit: zhu lesly  Lot number: [id number]  Expiration date: 1/31/2022  Assessment  Sensory level: T4  Swirl obtained: Yes  CSF: clear  Attempts: 1  Hemodynamics: stable

## 2021-05-17 NOTE — ANESTHESIA PRE PROCEDURE
Department of Anesthesiology  Preprocedure Note       Name:  Nathaniel Shultz   Age:  38 y.o.  :  1985                                          MRN:  [id number]         Date:  2021      Surgeon: Ivonne Recinos):  Cassia Olson DO    Procedure: Procedure(s):   SECTION    Medications prior to admission:   Prior to Admission medications    Medication Sig Start Date End Date Taking? Authorizing Provider   clindamycin (CLEOCIN) 300 MG capsule Take 1 capsule by mouth 4 times daily for 7 days 21  USMAN Loyd CNM   Hydrocort-Pramoxine, Perianal, [facility]) 2.5-1 % rectal cream Place rectally 3 times daily 5/10/21   USMAN Loyd CNM   lidocaine (XYLOCAINE) 5 % ointment Apply topically as needed every two hours 5/10/21   USMAN Loyd CNM   butalbital-acetaminophen-caffeine (FIORICET, ESGIC) -99 MG per tablet 1-2 tablets every 6 hours as needed for headache, no more than 6 tabs in 24 hours  Patient not taking: Reported on 2021   USMAN Loyd CNM   ferrous sulfate (IRON 325) 325 (65 Fe) MG tablet Take 325 mg by mouth daily (with breakfast)    Historical Provider, MD   acetaminophen (TYLENOL) 325 MG tablet Take 650 mg by mouth every 6 hours as needed for Pain    Historical Provider, MD   Misc.  Devices (BREAST PUMP) [facility] [place] Double electric pump - plans breastfeeding  Patient not taking: Reported on 2021 3/30/21   USMAN Loyd CNM   ondansetron [facility]) 4 MG tablet Take 1 tablet by mouth every 8 hours as needed for Nausea or Vomiting  Patient not taking: Reported on 2021   USMAN Loyd CNM   Insulin Aspart, w/Niacinamide, (FIASP SC) Inject into the skin    Historical Provider, MD   insulin glargine (LANTUS;BASAGLAR) 100 UNIT/ML injection pen Inject into the skin 20   Historical Provider, MD   Accu-Chek Softclix Lancets [facility] [place] TEST BLOOD SUGAR FASTING IN AM AND 1 HOUR POSTPRANDIAL 3 TIMES DAILY 11/6/20   Historical Provider, MD   Blood Glucose Monitoring Suppl (ACCU-CHEK LIZZETTE PLUS) w/Device KIT USE AS DIRECTED 11/6/20   Historical Provider, MD   metoclopramide (REGLAN) 10 MG tablet Take 1 tablet by mouth 3 times daily (with meals)  Patient not taking: Reported on 4/22/2021 11/16/20   Ben Morton, [address] [place]   blood glucose monitor supplies One glucometer, test strips, lancets - per insurance coverage Test blood sugar fasting in am and 1 hour postprandial three times daily 11/5/20   Ben Morton APRN - CNAIRAM   Prenatal Vit-Fe Fumarate-FA (PRENATAL VITAMINS PO) Take by mouth    Historical Provider, MD       Current medications:    Current Facility-Administered Medications   Medication Dose Route Frequency Provider Last Rate Last Admin    lactated ringers infusion   Intravenous Continuous Ben Morton, APRN - CNM        lactated ringers infusion 1,000 mL  1,000 mL Intravenous Once Ben Morton, APRN - CNM        sodium chloride flush 0.9 % injection 10 mL  10 mL Intravenous 2 times per day Ben Morton, APRN - CNM        sodium chloride flush 0.9 % injection 10 mL  10 mL Intravenous PRN Ben Praveen, APRN - CNM        0.9 % sodium chloride infusion  25 mL Intravenous PRN Ben Morton, APRN - CNM        cefOXitin (MEFOXIN) 2000 mg in dextrose 5% 50 mL (mini-bag)  2,000 mg Intravenous On Call to [employer], USMAN - MEERA        citric acid-sodium citrate (BICITRA) solution 30 mL  30 mL Oral Once Ben Morton APRN - CNM        famotidine (PEPCID) injection 20 mg  20 mg Intravenous Once Ben Morton, APRN - CNM        metoclopramide (REGLAN) injection 10 mg  10 mg Intravenous Once Ben Morton APRN - CNAIRAM        terbutaline (BRETHINE) injection 0.25 mg  0.25 mg Subcutaneous Once Ben Morton, APRN - CNM        terbutaline (BRETHINE) 1 MG/ML injection Allergies: Allergies   Allergen Reactions    Adhesive Tape Dermatitis    Sulfa Antibiotics        Problem List:    Patient Active Problem List   Diagnosis Code    Bicornuate uterus Q51.3    Insulin controlled gestational diabetes mellitus (GDM) in second trimester O24.414    Insulin controlled gestational diabetes mellitus (GDM) in third trimester O22.5   Phoebe Conine Dichorionic diamniotic twin pregnancy in third trimester O34.200    Dichorionic diamniotic twin pregnancy in third trimester O34.200    Hemorrhoids, internal, thrombosed K64.5       Past Medical History:        Diagnosis Date    Kidney calculi        Past Surgical History:        Procedure Laterality Date     SECTION  10/29/2013    CHOLECYSTECTOMY      DILATION AND CURETTAGE OF UTERUS      DILATION AND CURETTAGE OF UTERUS N/A 2019    [facility]AT[facility] [address]    HEMORRHOIDECTOMY EXCISION [place] [address] performed by Mireya Ruiz DO at 10 [facility] History:    Social History     Tobacco Use    Smoking status: Never Smoker    Smokeless tobacco: Never Used   Substance Use Topics    Alcohol use:  No                                Counseling given: Not Answered      Vital Signs (Current):   Vitals:    21 1702   Resp: 18   Temp: 36.4 °C (97.6 °F)   TempSrc: Oral                                              BP Readings from Last 3 Encounters:   21 134/84   21 132/80   21 122/60       NPO Status:        Patient had Ice cream at 1530         Patient had water at 1700                                                                      BMI:   Wt Readings from Last 3 Encounters:   21 211 lb (95.7 kg)   21 211 lb (95.7 kg)   21 211 lb (95.7 kg)     There is no height or weight on file to calculate BMI.    CBC:   Lab Results   Component Value Date    WBC 11.1 2021    RBC 3.35 2021 HGB 9.5 04/13/2021    HCT 30.3 04/13/2021    MCV 90.4 04/13/2021    RDW 14.1 04/13/2021     04/13/2021       CMP:   Lab Results   Component Value Date     04/13/2021    K 3.8 04/13/2021     04/13/2021    CO2 21 04/13/2021    BUN 10 04/13/2021    CREATININE 0.62 04/13/2021    GFRAA >60 04/13/2021    LABGLOM >60 04/13/2021    GLUCOSE 83 05/03/2021    PROT 6.2 04/13/2021    CALCIUM 8.9 04/13/2021    BILITOT 0.39 04/13/2021    ALKPHOS 121 04/13/2021    AST 25 04/13/2021    ALT 23 04/13/2021       POC Tests: No results for input(s): POCGLU, POCNA, POCK, POCCL, POCBUN, POCHEMO, POCHCT in the last 72 hours. Coags: No results found for: PROTIME, INR, APTT    HCG (If Applicable):   Lab Results   Component Value Date    HCGQUANT 810 (H) 10/14/2020        ABGs: No results found for: PHART, PO2ART, MPE5MBP, ORT2IZM, BEART, H5HGHSZY     Type & Screen (If Applicable):  No results found for: LABABO, LABRH    Drug/Infectious Status (If Applicable):  Lab Results   Component Value Date    HEPCAB REACTIVE 11/02/2020       COVID-19 Screening (If Applicable): No results found for: COVID19        Anesthesia Evaluation  Patient summary reviewed and Nursing notes reviewed no history of anesthetic complications:   Airway: Mallampati: III  TM distance: >3 FB   Neck ROM: full  Mouth opening: > = 3 FB Dental:          Pulmonary:Negative Pulmonary ROS and normal exam  breath sounds clear to auscultation                             Cardiovascular:Negative CV ROS  Exercise tolerance: good (>4 METS),           Rhythm: regular  Rate: normal           Beta Blocker:  Not on Beta Blocker         Neuro/Psych:   Negative Neuro/Psych ROS              GI/Hepatic/Renal:   (+) GERD: well controlled, renal disease: kidney stones,           Endo/Other:    (+) Diabetes (gestational.)using insulin, . Abdominal:           Vascular: negative vascular ROS.                                        Anesthesia Plan      spinal ASA 3             Anesthetic plan and risks discussed with patient. Plan discussed with CRNA.                   USMAN Kate - SHAHZAD   5/17/2021

## 2021-05-18 LAB
CHP ED QC CHECK: NORMAL
GLUCOSE BLD-MCNC: 107 MG/DL (ref 74–100)
GLUCOSE BLD-MCNC: 130 MG/DL
GLUCOSE BLD-MCNC: 139 MG/DL (ref 74–100)
HEMOGLOBIN: 9.3 G/DL (ref 11.9–15.1)

## 2021-05-18 PROCEDURE — 36415 COLL VENOUS BLD VENIPUNCTURE: CPT

## 2021-05-18 PROCEDURE — 6360000002 HC RX W HCPCS: Performed by: OBSTETRICS & GYNECOLOGY

## 2021-05-18 PROCEDURE — 85018 HEMOGLOBIN: CPT

## 2021-05-18 PROCEDURE — 6370000000 HC RX 637 (ALT 250 FOR IP): Performed by: OBSTETRICS & GYNECOLOGY

## 2021-05-18 PROCEDURE — 1220000000 HC SEMI PRIVATE OB R&B

## 2021-05-18 PROCEDURE — 82947 ASSAY GLUCOSE BLOOD QUANT: CPT

## 2021-05-18 PROCEDURE — 99024 POSTOP FOLLOW-UP VISIT: CPT | Performed by: ADVANCED PRACTICE MIDWIFE

## 2021-05-18 RX ORDER — ONDANSETRON 2 MG/ML
4 INJECTION INTRAMUSCULAR; INTRAVENOUS EVERY 6 HOURS PRN
Status: DISCONTINUED | OUTPATIENT
Start: 2021-05-18 | End: 2021-05-20 | Stop reason: HOSPADM

## 2021-05-18 RX ADMIN — DOCUSATE SODIUM 100 MG: 100 CAPSULE ORAL at 08:45

## 2021-05-18 RX ADMIN — OXYCODONE HYDROCHLORIDE AND ACETAMINOPHEN 2 TABLET: 5; 325 TABLET ORAL at 21:03

## 2021-05-18 RX ADMIN — ENOXAPARIN SODIUM 40 MG: 40 INJECTION SUBCUTANEOUS at 06:33

## 2021-05-18 RX ADMIN — OXYCODONE HYDROCHLORIDE AND ACETAMINOPHEN 2 TABLET: 5; 325 TABLET ORAL at 14:05

## 2021-05-18 RX ADMIN — PRENATAL WITH FERROUS FUM AND FOLIC ACID 1 TABLET: 3080; 920; 120; 400; 22; 1.84; 3; 20; 10; 1; 12; 200; 27; 25; 2 TABLET ORAL at 08:45

## 2021-05-18 RX ADMIN — ONDANSETRON 4 MG: 2 INJECTION INTRAMUSCULAR; INTRAVENOUS at 18:40

## 2021-05-18 RX ADMIN — OXYCODONE HYDROCHLORIDE AND ACETAMINOPHEN 2 TABLET: 5; 325 TABLET ORAL at 05:52

## 2021-05-18 RX ADMIN — OXYCODONE HYDROCHLORIDE AND ACETAMINOPHEN 2 TABLET: 5; 325 TABLET ORAL at 10:08

## 2021-05-18 RX ADMIN — DOCUSATE SODIUM 100 MG: 100 CAPSULE ORAL at 21:18

## 2021-05-18 RX ADMIN — KETOROLAC TROMETHAMINE 30 MG: 30 INJECTION, SOLUTION INTRAMUSCULAR; INTRAVENOUS at 18:40

## 2021-05-18 RX ADMIN — KETOROLAC TROMETHAMINE 30 MG: 30 INJECTION, SOLUTION INTRAMUSCULAR; INTRAVENOUS at 00:25

## 2021-05-18 RX ADMIN — KETOROLAC TROMETHAMINE 30 MG: 30 INJECTION, SOLUTION INTRAMUSCULAR; INTRAVENOUS at 06:33

## 2021-05-18 RX ADMIN — KETOROLAC TROMETHAMINE 30 MG: 30 INJECTION, SOLUTION INTRAMUSCULAR; INTRAVENOUS at 12:39

## 2021-05-18 RX ADMIN — OXYCODONE HYDROCHLORIDE AND ACETAMINOPHEN 2 TABLET: 5; 325 TABLET ORAL at 01:53

## 2021-05-18 ASSESSMENT — PAIN SCALES - GENERAL
PAINLEVEL_OUTOF10: 9
PAINLEVEL_OUTOF10: 5
PAINLEVEL_OUTOF10: 8
PAINLEVEL_OUTOF10: 5
PAINLEVEL_OUTOF10: 8
PAINLEVEL_OUTOF10: 9
PAINLEVEL_OUTOF10: 8

## 2021-05-18 ASSESSMENT — PAIN DESCRIPTION - DESCRIPTORS: DESCRIPTORS: BURNING;CRAMPING

## 2021-05-18 NOTE — LACTATION NOTE
Lactation education:    [x] Latch/ good latch vs shallow latch/ steps to obtaining deep latch    [x] How to know if infant is eating enough/ feedings per 24 hours, wet/dirty diapers    [x] Feeding/satiety cues      Lactation education resources given:     [x]  How to Breastfeed your baby - [address] publication      [x]  Information on feeding cues     [x]  Support group handout    [x]  Breastpump cleaning guidelines - [facility]     [x]  Breastfeeding & Safe Sleep handout - [address] publication    [x]  Calling All Dads! Handout - [address] publication    []  Breast and Nipple Care - [place]     []  [address]    []  [place]    []  Going Back to Work - [place]    []  Preventing Engorgement - [facility]    Supplies given:    [x]  Brush, soap and basin for breastpump cleaning    []  Insurance pump provided     [x]  [facility] pump set up for patient to use    Explained to patient, patient verbalizes understanding.         Signed:  Nuria Mayen RN, BSN, IBCLC

## 2021-05-18 NOTE — PROGRESS NOTES
Deann ESTES instructs RN to give the oral med (bicitra) and then take patient to OR and she can have her IV meds given in OR once she is back there and sitting for her spinal.

## 2021-05-18 NOTE — FLOWSHEET NOTE
Talked with Dr Luan Hein, orders clarified for POCT blood sugar in am at 0600 and patient may take own Clindamycin as per ordered for post op antibiotic for hemorrhoid surgery. Dr Luan Hein also informed of patient doing own blood sugar do to being shaky, blood sugar 160.

## 2021-05-18 NOTE — LACTATION NOTE
This note was copied from a baby's chart. Feeding Plan: /Late  Infant (35-39 weeks)     If Breastfeeding WELL:    Feed baby on cue, waking as needed for at least 8 times in 24 hours. Avoid pacifier (may use for medical procedures). Hand express and/or pump 15 minutes each side after breastfeeding 3 or more times in 24 hours. Give any expressed breastmilk to infant after the next breastfeeding. Continue this plan until mother's milk volume increases and baby has 3 or more yellow stools daily. If Breastfeeding POORLY:           *May need to use nipple shield*    Feed baby on cue, waking as needed for at least 8 times in 24 hours. Avoid pacifier (may use for medical procedures). Give the following amounts after or in place of breastfeeding. Used pumped milk - may add formula if not enough pumped milk. 0-12 hours:    up to 5 ml every 3 hours   12-24 hours:   5-10 ml every 3 hours   24-48 hours:   5-15 ml every 3 hours   48-72 hours:   15-30 ml every 3 hours   72-96 hours:   30-45 ml every 3 hours   Over 96 hours:   45-60 ml every 3 hours    If baby cues for more food, repeat feeding until satisfied. (Breastfeed if infant is able). Hand express and/or pump 15 minutes each side after breastfeeding attempts. Reduce pumping time if making more than one bottle daily that baby does not need. Continue to pump and feed expressed breastmilk every feeding until baby is nursing well, has enough wet and dirty diapers, and formula is not needed.

## 2021-05-18 NOTE — PROGRESS NOTES
Pt here accompanied by her . Pt states she had an office visit earlier today then she went home and her membranes ruptured. Pt breathing with contractions and squeezing 's hand. Pt asking how soon she can get her spinal because she needs something for contraction pain.

## 2021-05-18 NOTE — PROGRESS NOTES
C/S  Labor and Delivery       Post Partum Progress Note           SUBJECTIVE:  1st day s/p repeat  with twins and GDM insulin controlled, this am fasting insulin 107  Flatus:Present  BM:no  Diet: Tolerating regular diet   Ambulation: Ambulateswith assist difficulty    OBJECTIVE:      Vitals:  BP (!) 100/55   Pulse 80   Temp 98.1 °F (36.7 °C) (Oral)   Resp 24   Ht 5' 4\" (1.626 m)   Wt 211 lb (95.7 kg)   SpO2 91%   Breastfeeding Unknown   BMI 36.22 kg/m²   Patient Vitals for the past 24 hrs:   BP Temp Temp src Pulse Resp SpO2 Height Weight   21 0732 (!) 100/55 98.1 °F (36.7 °C) Oral 80 24 -- -- --   21 0305 111/62 98.1 °F (36.7 °C) Oral 81 16 -- -- --   21 0037 (!) 114/91 98.3 °F (36.8 °C) Oral 73 18 -- -- --   21 -- -- -- -- -- 91 % -- --   21 -- -- -- -- -- 98 % -- --   21 -- -- -- -- -- (!) 89 % -- --   21 -- -- -- -- -- 99 % -- --   21 -- -- -- -- -- 98 % -- --   21 -- -- -- -- -- 96 % -- --   21 -- -- -- -- -- 95 % -- --   21 -- -- -- -- -- 96 % -- --   21 -- -- -- -- -- 96 % -- --   21 -- -- -- -- -- 96 % -- --   21 -- -- -- -- -- 96 % -- --   21 -- -- -- -- -- (!) 85 % -- --   21 -- -- -- -- -- 100 % -- --   21 -- -- -- -- -- 99 % -- --   21 126/63 -- -- 87 -- -- -- --   21 -- -- -- -- -- 97 % -- --   21 -- -- -- -- -- 99 % -- --   21 -- -- -- -- -- 96 % -- --   21 -- -- -- -- -- 93 % -- --   21 133/68 -- -- 90 -- -- -- --   21 -- -- -- -- -- 95 % -- --   21 -- -- -- -- -- -- 5' 4\" (1.626 m) 211 lb (95.7 kg)   21 -- -- -- -- -- 96 % -- --   21 -- -- -- -- -- 96 % -- --   21 124/68 -- -- 83 -- -- -- --   21 -- -- -- -- -- 96 % -- --   21 -- -- -- -- -- 96 % -- --   21 -- -- -- -- -- 96 % -- --   05/17/21 2125 125/65 -- -- 82 -- -- -- --   05/17/21 2124 -- -- -- -- -- 96 % -- --   05/17/21 2119 -- -- -- -- -- 96 % -- --   05/17/21 2114 -- -- -- -- -- 96 % -- --   05/17/21 2110 130/73 -- -- 78 -- -- -- --   05/17/21 2109 -- -- -- -- -- 96 % -- --   05/17/21 2104 -- -- -- -- -- 97 % -- --   05/17/21 2059 -- -- -- -- -- 96 % -- --   05/17/21 2055 133/73 98.2 °F (36.8 °C) Oral 71 18 -- -- --   05/17/21 2054 -- -- -- -- -- 97 % -- --   05/17/21 2049 -- -- -- -- -- 97 % -- --   05/17/21 2044 -- -- -- -- -- 96 % -- --   05/17/21 2040 (!) 141/76 -- -- 73 -- -- -- --   05/17/21 2039 -- -- -- -- -- 97 % -- --   05/17/21 2034 -- -- -- -- -- 97 % -- --   05/17/21 2029 -- -- -- -- -- 96 % -- --   05/17/21 2024 124/78 -- -- 78 16 96 % -- --   05/17/21 2019 -- -- -- -- -- 98 % -- --   05/17/21 2014 -- -- -- -- -- 97 % -- --   05/17/21 2009 129/72 -- -- 91 16 98 % -- --   05/17/21 2004 -- -- -- -- -- 97 % -- --   05/17/21 1959 -- -- -- -- -- 97 % -- --   05/17/21 1954 (!) 130/55 -- -- 92 16 96 % -- --   05/17/21 1949 -- -- -- -- -- 96 % -- --   05/17/21 1944 -- -- -- -- -- 95 % -- --   05/17/21 1939 (!) 143/60 -- -- 95 16 95 % -- --   05/17/21 1938 -- -- -- -- -- 91 % -- --   05/17/21 1934 -- -- -- -- -- 94 % -- --   05/17/21 1929 -- -- -- -- -- 94 % -- --   05/17/21 1927 -- -- -- -- -- 94 % -- --   05/17/21 1924 134/79 98 °F (36.7 °C) Oral 97 16 95 % -- --   05/17/21 1921 -- -- -- -- -- 91 % -- --   05/17/21 1910 123/64 -- -- 102 -- -- -- --   05/17/21 1909 123/64 -- -- 101 16 99 % -- --   05/17/21 1904 (!) 136/58 -- -- 100 16 98 % -- --   05/17/21 1900 117/63 -- -- -- -- -- -- --   05/17/21 1859 117/63 -- -- 99 16 -- -- --   05/17/21 1858 -- -- -- -- -- 93 % -- --   05/17/21 1857 -- -- -- -- -- 93 % -- --   05/17/21 1854 (!) 139/100 97.1 °F (36.2 °C) Oral 104 16 94 % -- --   05/17/21 1708 125/82 -- -- 99 -- -- -- --   05/17/21 1702 -- 97.6 °F (36.4 °C) Oral -- 18 -- -- --       ABDOMEN:  No scars, normal bowel sounds, soft, non-distended, non-tender, no masses palpated, no hepatosplenomegally  INCISION: dressing in place, clean, dry and intact  GENITAL/URINARY:  External Genitalia:  General appearance; normal, Hair distribution; normal, Lesions absent  Cor: RRR no Murmurs  Pulmonary: clear to auscultation anterior and posterior  Extremities: no Clubbing cyanosis or ecchymosis    DATA:    CBC:   Lab Results   Component Value Date    WBC 10.1 2021    RBC 3.90 2021    HGB 9.3 2021    HCT 35.6 2021    MCV 91.3 2021    MCH 29.2 2021    MCHC 32.0 2021    RDW 17.2 2021     2021    MPV 12.4 2021       ASSESSMENT:      Active Problems:    35 weeks gestation of pregnancy  Plan: delivered    Uterus didelphys in pregnancy, third trimester  Plan: delivered    Previous  section  Plan: delivered    Active  labor  Plan: delivered     delivery delivered         S/P C/S post op day # 1     PLAN:  Monitor sugars unmedicated, patient to call Dr. Killian Barbosa for plan. Continue clindamycin for s/p hemorrhoidectomy last week.   Routine orders and instructions

## 2021-05-19 LAB
FRUCTOSAMINE: 192 UMOL/L (ref 170–285)
SURGICAL PATHOLOGY REPORT: NORMAL

## 2021-05-19 PROCEDURE — 1220000000 HC SEMI PRIVATE OB R&B

## 2021-05-19 PROCEDURE — 6360000002 HC RX W HCPCS: Performed by: OBSTETRICS & GYNECOLOGY

## 2021-05-19 PROCEDURE — 6370000000 HC RX 637 (ALT 250 FOR IP): Performed by: OBSTETRICS & GYNECOLOGY

## 2021-05-19 RX ADMIN — IBUPROFEN 800 MG: 800 TABLET, FILM COATED ORAL at 18:08

## 2021-05-19 RX ADMIN — OXYCODONE HYDROCHLORIDE AND ACETAMINOPHEN 2 TABLET: 5; 325 TABLET ORAL at 08:37

## 2021-05-19 RX ADMIN — IBUPROFEN 800 MG: 800 TABLET, FILM COATED ORAL at 02:29

## 2021-05-19 RX ADMIN — OXYCODONE HYDROCHLORIDE AND ACETAMINOPHEN 1 TABLET: 5; 325 TABLET ORAL at 15:05

## 2021-05-19 RX ADMIN — OXYCODONE HYDROCHLORIDE AND ACETAMINOPHEN 1 TABLET: 5; 325 TABLET ORAL at 04:18

## 2021-05-19 RX ADMIN — IBUPROFEN 800 MG: 800 TABLET, FILM COATED ORAL at 10:19

## 2021-05-19 RX ADMIN — PRENATAL WITH FERROUS FUM AND FOLIC ACID 1 TABLET: 3080; 920; 120; 400; 22; 1.84; 3; 20; 10; 1; 12; 200; 27; 25; 2 TABLET ORAL at 08:37

## 2021-05-19 RX ADMIN — ENOXAPARIN SODIUM 40 MG: 40 INJECTION SUBCUTANEOUS at 09:45

## 2021-05-19 RX ADMIN — DOCUSATE SODIUM 100 MG: 100 CAPSULE ORAL at 08:37

## 2021-05-19 RX ADMIN — DOCUSATE SODIUM 100 MG: 100 CAPSULE ORAL at 20:43

## 2021-05-19 ASSESSMENT — PAIN SCALES - GENERAL
PAINLEVEL_OUTOF10: 6
PAINLEVEL_OUTOF10: 5
PAINLEVEL_OUTOF10: 7

## 2021-05-19 ASSESSMENT — PAIN DESCRIPTION - DESCRIPTORS: DESCRIPTORS: CRAMPING

## 2021-05-19 NOTE — PROGRESS NOTES
C/S  Labor and Delivery                                                                                     Post Partum Progress Note             Flatus:+  Bm: no  Diet: Tolerating regular diet   Ambulation: Ambulates    OBJECTIVE:      Vitals:  VSS    ABDOMEN:  NT  INCISION: C/D NSI  GENITAL/URINARY:  normal  Pulmonary: no resp distress  Extremities: WNL    DATA:      ASSESSMENT:         S/P C/S post op day # 2     PLAN:  Planning discharge tomorrow

## 2021-05-19 NOTE — PLAN OF CARE
signs or symptoms of venous thromboembolism  Outcome: Ongoing  Goal: Absence of signs or symptoms of impaired coagulation  Description: Absence of signs or symptoms of impaired coagulation  Outcome: Ongoing

## 2021-05-19 NOTE — LACTATION NOTE
Lactation note:    [] Feeding observed, see lactation flowsheet. [x] Patient states breastfeeding is going better than yesterday for both babies: has been pumping and feeding expressed milk as well:  no complaints. Denies questions or concerns. [] Patient has questions/concerns. [x] Verbalizes understanding, advised to follow up with lactation consultant if necessary.

## 2021-05-19 NOTE — PLAN OF CARE
RN  Outcome: Ongoing     Problem: Nausea/Vomiting:  Goal: Absence of nausea/vomiting  Description: Absence of nausea/vomiting  5/19/2021 1816 by Berto Peters RN  Outcome: Ongoing  5/19/2021 0850 by April Sparrow RN  Outcome: Ongoing     Problem: Pain - Acute:  Goal: Pain level will decrease  Description: Pain level will decrease  5/19/2021 1816 by Berto Peters RN  Outcome: Ongoing  5/19/2021 0850 by April Sparrow RN  Outcome: Ongoing  5/19/2021 0850 by April Sparrow RN  Outcome: Ongoing     Problem: Urinary Retention:  Goal: Urinary elimination within specified parameters  Description: Urinary elimination within specified parameters  5/19/2021 1816 by Berto Peters RN  Outcome: Ongoing  5/19/2021 0850 by April Sparrow RN  Outcome: Ongoing     Problem: Venous Thromboembolism:  Goal: Will show no signs or symptoms of venous thromboembolism  Description: Will show no signs or symptoms of venous thromboembolism  5/19/2021 1816 by Berto Peters RN  Outcome: Ongoing  5/19/2021 0850 by April Sparrow RN  Outcome: Ongoing  Goal: Absence of signs or symptoms of impaired coagulation  Description: Absence of signs or symptoms of impaired coagulation  5/19/2021 1816 by Berto Peters RN  Outcome: Ongoing  5/19/2021 0850 by April Sparrow RN  Outcome: Ongoing

## 2021-05-20 VITALS
RESPIRATION RATE: 20 BRPM | WEIGHT: 211 LBS | TEMPERATURE: 98 F | BODY MASS INDEX: 36.02 KG/M2 | HEIGHT: 64 IN | SYSTOLIC BLOOD PRESSURE: 129 MMHG | DIASTOLIC BLOOD PRESSURE: 84 MMHG | HEART RATE: 88 BPM | OXYGEN SATURATION: 91 %

## 2021-05-20 PROCEDURE — 6360000002 HC RX W HCPCS: Performed by: OBSTETRICS & GYNECOLOGY

## 2021-05-20 PROCEDURE — 6370000000 HC RX 637 (ALT 250 FOR IP): Performed by: OBSTETRICS & GYNECOLOGY

## 2021-05-20 RX ORDER — IBUPROFEN 800 MG/1
800 TABLET ORAL EVERY 8 HOURS
Qty: 120 TABLET | Refills: 3 | Status: SHIPPED | OUTPATIENT
Start: 2021-05-20 | End: 2021-11-05 | Stop reason: ALTCHOICE

## 2021-05-20 RX ORDER — PSEUDOEPHEDRINE HCL 30 MG
100 TABLET ORAL 2 TIMES DAILY PRN
Qty: 30 CAPSULE | Refills: 0 | Status: SHIPPED | OUTPATIENT
Start: 2021-05-20 | End: 2021-11-05 | Stop reason: ALTCHOICE

## 2021-05-20 RX ORDER — OXYCODONE HYDROCHLORIDE AND ACETAMINOPHEN 5; 325 MG/1; MG/1
1 TABLET ORAL EVERY 6 HOURS PRN
Qty: 12 TABLET | Refills: 0 | Status: SHIPPED | OUTPATIENT
Start: 2021-05-20 | End: 2021-05-23

## 2021-05-20 RX ADMIN — IBUPROFEN 800 MG: 800 TABLET, FILM COATED ORAL at 02:10

## 2021-05-20 RX ADMIN — DOCUSATE SODIUM 100 MG: 100 CAPSULE ORAL at 09:18

## 2021-05-20 RX ADMIN — OXYCODONE HYDROCHLORIDE AND ACETAMINOPHEN 1 TABLET: 5; 325 TABLET ORAL at 01:34

## 2021-05-20 RX ADMIN — IBUPROFEN 800 MG: 800 TABLET, FILM COATED ORAL at 10:10

## 2021-05-20 RX ADMIN — PRENATAL WITH FERROUS FUM AND FOLIC ACID 1 TABLET: 3080; 920; 120; 400; 22; 1.84; 3; 20; 10; 1; 12; 200; 27; 25; 2 TABLET ORAL at 09:18

## 2021-05-20 RX ADMIN — ENOXAPARIN SODIUM 40 MG: 40 INJECTION SUBCUTANEOUS at 09:18

## 2021-05-20 ASSESSMENT — PAIN SCALES - GENERAL: PAINLEVEL_OUTOF10: 6

## 2021-05-20 ASSESSMENT — PAIN DESCRIPTION - DESCRIPTORS: DESCRIPTORS: CRAMPING

## 2021-05-20 NOTE — DISCHARGE SUMMARY
Obstetrical Discharge Form        Gestational Age:35w2d    Antepartum complications: gestational diabetes    Date of Delivery: 5/17/21    Type of Delivery: R C/S       Delivered By:  Dr. Xochitl Back By:NILE OLEARY}      Baby: twins, boy & girl    Anesthesia:  spinal      Intrapartum complications: None    Feeding method: breast    Blood type: O POSITIVE      Rubella:    Rubella Antibody, IGG   Date Value Ref Range Status   11/02/2020 54.6 IU/mL Final     Comment:                 REFERENCE RANGE:  <5.0       NON-REACTIVE (non-immune)  5.0 TO 9.9 EQUIVOCAL  >=10.0     REACTIVE     (immune)             T. Pallidium, IGG:    T. pallidum, IgG   Date Value Ref Range Status   11/02/2020 NONREACTIVE NONREACTIVE Final     Comment:           T. pallidum antibodies are not detected. There is no serological evidence of infection with T. pallidum (early primary syphilis   cannot be excluded). Retest in 2-4 weeks if syphilis is clinically suspect.              Hepatitis B Surface Antigen:   Hepatitis B Surface Ag   Date Value Ref Range Status   11/02/2020 NONREACTIVE NONREACTIVE Final     HIV:    HIV 1/2 Antibody   Date Value Ref Range Status   03/26/2013 non reactive  Final       Results for orders placed or performed during the hospital encounter of 05/17/21   CBC   Result Value Ref Range    WBC 10.1 3.5 - 11.3 k/uL    RBC 3.90 (L) 3.95 - 5.11 m/uL    Hemoglobin 11.4 (L) 11.9 - 15.1 g/dL    Hematocrit 35.6 (L) 36.3 - 47.1 %    MCV 91.3 82.6 - 102.9 fL    MCH 29.2 25.2 - 33.5 pg    MCHC 32.0 28.4 - 34.8 g/dL    RDW 17.2 (H) 11.8 - 14.4 %    Platelets 214 488 - 110 k/uL    MPV 12.4 8.1 - 13.5 fL    NRBC Automated 0.0 0.0 per 100 WBC   Hemoglobin   Result Value Ref Range    Hemoglobin 9.3 (L) 11.9 - 15.1 g/dL   Glucose, Whole Blood   Result Value Ref Range    POC Glucose 107 (H) 74 - 100 mg/dL   Glucose, Whole Blood   Result Value Ref Range    POC Glucose 139 (H) 74 - 100 mg/dL   Surgical Pathology   Result Value Ref Range    Surgical Pathology Report       -- Diagnosis --  PLACENTA, DELIVERED:  TWIN A:   -THIRD TRIMESTER PLACENTA WITH THREE-VESSEL CORD WITH SQUAMOUS  METAPLASIA OF AMNION SURFACE. -MEMBRANES WITH FOCAL EARLY MINIMAL ACUTE CHORIOAMNIONITIS. -PLACENTAL DISC WITH FOCAL VILLOUS EDEMA AND FOCAL INCREASED SYNCYTIAL  KNOTS. TWIN B:  -THIRD TRIMESTER PLACENTA WITH THREE-VESSEL CORD WITH SQUAMOUS  METAPLASIA OF AMNION SURFACE. -MEMBRANES ARE UNREMARKABLE. -PLACENTAL DISC WITH FOCAL CHRONIC VILLITIS, FOCAL INCREASED SYNCYTIAL  KNOTS, AND PARTIALLY FRAGMENTED/TORN MATERNAL SURFACE APPROXIMATELY  10%) AND COMPLETENESS CANNOT BE DETERMINED. Chris Bermudez M.D  **Electronically Signed Out**         eyad/5/19/2021       Clinical Information  Operative Findings:  PLACENTA, BABY A WITH STITCH    Source of Specimen  1: BABY A PLACENTA WITH STITCH, BABY B PLACENTA    Gross Description  \"MICKI GARCES, BABY A PLACENTA WITH STITCH, BABY B PLACENTA\" Two  separate placentas with attached membranes and umbilical cord  segments. Alberta Shree ched to one cord is a suture designating baby A. BABY A    UMBILICAL CORD         Length:     18.0 cm       Diameter:     1.4 cm  True knots:     No  Number of vessels:     3  Spiraling:     Normal  Insertion into fetal surface:     Paracentral    MEMBRANES  Color:     Purple-gray, focally opacified with a partial  circummarginate insertion over 30% of the disc.   The remaining  insertion is marginal.  Meconium staining:     No    FETAL SURFACE  Color:     Purple-gray, with a normal array of surface vessels  Subchorionic fibrin:     Patchy and marginal and involves  approximately 10% of the disc    MATERNAL SURFACE  [place]yledons:            -All present and intact:     Yes  -Focal lesions:     No    Placental size:     14.5 x 14.5 x 3.0 cm  Shape:     Circular  Weight:     264 grams    BABY B    UMBILICAL CORD         Length:     21.5 cm       Diameter:     1.4 cm  True knots:     No  Number of Result Value Ref Range    Expiration Date 05/20/2021,2359     Arm Band Number [id number]     ABO/Rh O POSITIVE     Antibody Screen NEGATIVE          Postpartum complications: none    Discharge Medication:    Shiloh Alexandre   Home Medication Instructions LPI:[id number]    Printed on:05/20/21 0582   Medication Information                      butalbital-acetaminophen-caffeine (FIORICET, ESGIC) -40 MG per tablet  1-2 tablets every 6 hours as needed for headache, no more than 6 tabs in 24 hours             docusate sodium (COLACE, DULCOLAX) 100 MG CAPS  Take 100 mg by mouth 2 times daily as needed for Constipation             ferrous sulfate (IRON 325) 325 (65 Fe) MG tablet  Take 325 mg by mouth daily (with breakfast)             Hydrocort-Pramoxine, Perianal, (ANALPRAM-HC) 2.5-1 % rectal cream  Place rectally 3 times daily             ibuprofen (ADVIL;MOTRIN) 800 MG tablet  Take 1 tablet by mouth every 8 hours             ondansetron (ZOFRAN) 4 MG tablet  Take 1 tablet by mouth every 8 hours as needed for Nausea or Vomiting             oxyCODONE-acetaminophen (PERCOCET) 5-325 MG per tablet  Take 1 tablet by mouth every 6 hours as needed for Pain for up to 3 days.              Prenatal Vit-Fe Fumarate-FA (PRENATAL VITAMINS PO)  Take by mouth                    Admit date: 5/17/2021  4:58 PM    Discharge Date: 5/20/2021    Discharged to: Home in stable condition        Plan:   Follow up with Zac Mckeon CNM in 1 week for incision check

## 2021-05-24 ENCOUNTER — POSTPARTUM VISIT (OUTPATIENT)
Dept: OBGYN | Age: 36
End: 2021-05-24
Payer: COMMERCIAL

## 2021-05-24 ENCOUNTER — TELEPHONE (OUTPATIENT)
Dept: OBGYN | Age: 36
End: 2021-05-24

## 2021-05-24 VITALS
BODY MASS INDEX: 32.42 KG/M2 | WEIGHT: 189.9 LBS | HEIGHT: 64 IN | SYSTOLIC BLOOD PRESSURE: 126 MMHG | DIASTOLIC BLOOD PRESSURE: 74 MMHG

## 2021-05-24 DIAGNOSIS — Z23 NEED FOR TDAP VACCINATION: ICD-10-CM

## 2021-05-24 PROCEDURE — 99024 POSTOP FOLLOW-UP VISIT: CPT | Performed by: ADVANCED PRACTICE MIDWIFE

## 2021-05-24 NOTE — PROGRESS NOTES
POSTPARTUM EXAM    Date of service: 2021    Varun Retana  Is a 38 y.o.  female    PT's PCP is: USMAN Oh CNP     : 1985     OB History    Para Term  AB Living   5 2 1 1 2 3   SAB TAB Ectopic Molar Multiple Live Births   2       1 2      # Outcome Date GA Lbr Luis/2nd Weight Sex Delivery Anes PTL Lv   5A  21 35w2d  5 lb 2 oz (2.324 kg) M CS-LTranv Spinal Y KHADIJAH   5B  21 35w2d  5 lb 8.7 oz (2.515 kg) F CS-LTranv Spinal Y KHADIJAH   4 Term 10/29/13 39w2d  7 lb 9.2 oz (3.435 kg) M CS-Unspec      3 2012 6w0d          2 2011 6w0d          1                  Social History     Tobacco Use   Smoking Status Never Smoker   Smokeless Tobacco Never Used         Social History     Substance and Sexual Activity   Alcohol Use No         Delivery date 2021    Type of delivery:  Repeat  section    Laceration:Yes,     Infant gender: male, female    Infant name: Radha Jenningsic    Are you breast or bottle feeding? Breast    Have you been sexually active since delivery? No    Vital Signs: Blood pressure 126/74, height 5' 4\" (1.626 m), weight 189 lb 14.4 oz (86.1 kg), currently breastfeeding.      Labs:    Blood Type and Rh: O POSITIVE          Allergies: Adhesive tape and Sulfa antibiotics      Current Outpatient Medications:     ibuprofen (ADVIL;MOTRIN) 800 MG tablet, Take 1 tablet by mouth every 8 hours, Disp: 120 tablet, Rfl: 3    docusate sodium (COLACE, DULCOLAX) 100 MG CAPS, Take 100 mg by mouth 2 times daily as needed for Constipation, Disp: 30 capsule, Rfl: 0    Hydrocort-Pramoxine, Perianal, (ANALPRAM-HC) 2.5-1 % rectal cream, Place rectally 3 times daily, Disp: 30 g, Rfl: 0    Prenatal Vit-Fe Fumarate-FA (PRENATAL VITAMINS PO), Take by mouth, Disp: , Rfl:     butalbital-acetaminophen-caffeine (FIORICET, ESGIC) -40 MG per tablet, 1-2 tablets every 6 hours as needed for headache, no more than 6 tabs in 24 hours

## 2021-05-25 PROCEDURE — 90471 IMMUNIZATION ADMIN: CPT | Performed by: ADVANCED PRACTICE MIDWIFE

## 2021-05-25 PROCEDURE — 90715 TDAP VACCINE 7 YRS/> IM: CPT | Performed by: ADVANCED PRACTICE MIDWIFE

## 2021-05-25 NOTE — PATIENT INSTRUCTIONS
Patient Education        Tdap (Tetanus, Diphtheria, Pertussis) Vaccine: What You Need to Know  Why get vaccinated? Tdap vaccine can prevent tetanus, diphtheria, and pertussis. Diphtheria and pertussis spread from person to person. Tetanus enters the body through cuts or wounds. · TETANUS (T) causes painful stiffening of the muscles. Tetanus can lead to serious health problems, including being unable to open the mouth, having trouble swallowing and breathing, or death. · DIPHTHERIA (D) can lead to difficulty breathing, heart failure, paralysis, or death. · PERTUSSIS (aP), also known as \"whooping cough,\" can cause uncontrollable, violent coughing which makes it hard to breathe, eat, or drink. Pertussis can be extremely serious in babies and young children, causing pneumonia, convulsions, brain damage, or death. In teens and adults, it can cause weight loss, loss of bladder control, passing out, and rib fractures from severe coughing. Tdap vaccine  Tdap is only for children 7 years and older, adolescents, and adults. Adolescents should receive a single dose of Tdap, preferably at age 6 or 15 years. Pregnant women should get a dose of Tdap during every pregnancy, to protect the  from pertussis. Infants are most at risk for severe, life threatening complications from pertussis. Adults who have never received Tdap should get a dose of Tdap. Also, adults should receive a booster dose every 10 years, or earlier in the case of a severe and dirty wound or burn. Booster doses can be either Tdap or Td (a different vaccine that protects against tetanus and diphtheria but not pertussis). Tdap may be given at the same time as other vaccines. Talk with your health care provider  Tell your vaccine provider if the person getting the vaccine:  · Has had an allergic reaction after a previous dose of any vaccine that protects against tetanus, diphtheria, or pertussis, or has any severe, life threatening allergies. · Has had a coma, decreased level of consciousness, or prolonged seizures within 7 days after a previous dose of any pertussis vaccine (DTP, DTaP, or Tdap). · Has seizures or another nervous system problem. · Has ever had Guillain-Barré Syndrome (also called GBS). · Has had severe pain or swelling after a previous dose of any vaccine that protects against tetanus or diphtheria. In some cases, your health care provider may decide to postpone Tdap vaccination to a future visit. People with minor illnesses, such as a cold, may be vaccinated. People who are moderately or severely ill should usually wait until they recover before getting Tdap vaccine. Your health care provider can give you more information. Risks of a vaccine reaction  · Pain, redness, or swelling where the shot was given, mild fever, headache, feeling tired, and nausea, vomiting, diarrhea, or stomachache sometimes happen after Tdap vaccine. People sometimes faint after medical procedures, including vaccination. Tell your provider if you feel dizzy or have vision changes or ringing in the ears. As with any medicine, there is a very remote chance of a vaccine causing a severe allergic reaction, other serious injury, or death. What if there is a serious problem? An allergic reaction could occur after the vaccinated person leaves the clinic. If you see signs of a severe allergic reaction (hives, swelling of the face and throat, difficulty breathing, a fast heartbeat, dizziness, or weakness), call 9-1-1 and get the person to the nearest hospital.  For other signs that concern you, call your health care provider. Adverse reactions should be reported to the Vaccine Adverse Event Reporting System (VAERS). Your health care provider will usually file this report, or you can do it yourself. Visit the VAERS website at www.vaers. hhs.gov or call 8-603.274.9034. VAERS is only for reporting reactions, and VAERS staff do not give medical advice.   The National Vaccine Injury Compensation Program  The National Vaccine Injury Compensation Program (VICP) is a federal program that was created to compensate people who may have been injured by certain vaccines. Visit the VICP website at www.[facility].gov/vaccinecompensation or call 0-388.487.3652 to learn about the program and about filing a claim. There is a time limit to file a claim for compensation. How can I learn more? · Ask your health care provider. · Call your local or state health department. · Contact the Centers for Disease Control and Prevention (CDC):  ? Call 9-553.562.1267 (1-800-CDC-INFO) or  ? Visit CDC's website at www.cdc.gov/vaccines  Vaccine Information Statement (Interim)  Tdap (Tetanus, Diphtheria, Pertussis) Vaccine  04/01/2020  42 PADMINI Red[address] 993AP-[id number]  Department of Health and Human Services  Centers for Disease Control and Prevention  Many Vaccine Information Statements are available in [place] and other languages. See www.immunize.org/vis. Muchas hojas de información sobre vacunas están disponibles en español y en otros idiomas. Visite www.immunize.org/vis. Care instructions adapted under license by [facility] ([facility]). If you have questions about a medical condition or this instruction, always ask your healthcare professional. [address] any warranty or liability for your use of this information.

## 2021-06-09 ENCOUNTER — TELEPHONE (OUTPATIENT)
Dept: OBGYN | Age: 36
End: 2021-06-09

## 2021-06-09 DIAGNOSIS — N61.0 MASTITIS: Primary | ICD-10-CM

## 2021-06-09 NOTE — TELEPHONE ENCOUNTER
Lets just treat with dicloxacillin 500 QID x 10 days, should feel better in 24-48 hours if not call back to be seen

## 2021-06-10 ENCOUNTER — TELEPHONE (OUTPATIENT)
Dept: OBGYN | Age: 36
End: 2021-06-10

## 2021-06-10 NOTE — TELEPHONE ENCOUNTER
Spoke with patient, she is feeling better today, fever is down, low grade  temp. Only today, has been alternating Tylenol and Ibuprofen and has 5 doses of antibiotic.

## 2021-06-10 NOTE — TELEPHONE ENCOUNTER
Phone call,( message left)  inquring how she is feeling today. Patient was started on treatment yesterday for mastitis. Will await return call from patient.

## 2021-07-06 ENCOUNTER — POSTPARTUM VISIT (OUTPATIENT)
Dept: OBGYN | Age: 36
End: 2021-07-06
Payer: COMMERCIAL

## 2021-07-06 VITALS
SYSTOLIC BLOOD PRESSURE: 124 MMHG | BODY MASS INDEX: 29.71 KG/M2 | WEIGHT: 174 LBS | HEIGHT: 64 IN | DIASTOLIC BLOOD PRESSURE: 74 MMHG

## 2021-07-06 DIAGNOSIS — Z12.4 SCREENING FOR CERVICAL CANCER: ICD-10-CM

## 2021-07-06 LAB — HGB, POC: 13.9

## 2021-07-06 PROCEDURE — 0503F POSTPARTUM CARE VISIT: CPT | Performed by: ADVANCED PRACTICE MIDWIFE

## 2021-07-06 NOTE — PROGRESS NOTES
POSTPARTUM EXAM    Date of service: 2021    Stacey Vazquez  Is a 38 y.o.  female    PT's PCP is: Naomi Patient, APRN - CNP     : 1985     OB History    Para Term  AB Living   5 2 1 1 2 3   SAB TAB Ectopic Molar Multiple Live Births   2       1 2      # Outcome Date GA Lbr Luis/2nd Weight Sex Delivery Anes PTL Lv   5A  21 35w2d  5 lb 2 oz (2.324 kg) M CS-LTranv Spinal Y KHADIJAH   5B  21 35w2d  5 lb 8.7 oz (2.515 kg) F CS-LTranv Spinal Y KHADIJAH   4 Term 10/29/13 39w2d  7 lb 9.2 oz (3.435 kg) M CS-Unspec      3 2012 6w0d          2 2011 6w0d          1                  Social History     Tobacco Use   Smoking Status Never Smoker   Smokeless Tobacco Never Used         Social History     Substance and Sexual Activity   Alcohol Use No         Delivery date 2021    Type of delivery:  Repeat  section        Infant gender: male. female    Infant name: Ritehs Lopez    Are you breast or bottle feeding? Breast    Have you been sexually active since delivery? No    Vital Signs: Blood pressure 124/74, height 5' 4\" (1.626 m), weight 174 lb (78.9 kg), currently breastfeeding.      Labs:    Blood Type and Rh: O POSITIVE          Allergies: Adhesive tape and Sulfa antibiotics      Current Outpatient Medications:     Prenatal Vit-Fe Fumarate-FA (PRENATAL VITAMINS PO), Take by mouth, Disp: , Rfl:     ibuprofen (ADVIL;MOTRIN) 800 MG tablet, Take 1 tablet by mouth every 8 hours (Patient not taking: Reported on 2021), Disp: 120 tablet, Rfl: 3    docusate sodium (COLACE, DULCOLAX) 100 MG CAPS, Take 100 mg by mouth 2 times daily as needed for Constipation (Patient not taking: Reported on 2021), Disp: 30 capsule, Rfl: 0    Hydrocort-Pramoxine, Perianal, (ANALPRAM-HC) 2.5-1 % rectal cream, Place rectally 3 times daily (Patient not taking: Reported on 2021), Disp: 30 g, Rfl: 0    butalbital-acetaminophen-caffeine (FIORICET, ESGIC) -07 MG per tablet, 1-2 tablets every 6 hours as needed for headache, no more than 6 tabs in 24 hours (Patient not taking: Reported on 2021), Disp: 30 tablet, Rfl: 1    ferrous sulfate (IRON 325) 325 (65 Fe) MG tablet, Take 325 mg by mouth daily (with breakfast) (Patient not taking: Reported on 2021), Disp: , Rfl:     ondansetron (ZOFRAN) 4 MG tablet, Take 1 tablet by mouth every 8 hours as needed for Nausea or Vomiting (Patient not taking: Reported on 2021), Disp: 30 tablet, Rfl: 1    Last Yearly:  2018    Last pap: 2018    Last HPV:     Chief Complaint   Patient presents with    Postpartum Care     Postpartum exam. Patient had  2021. How many Hours of sleep do you get a night: 6    Do you have a normal appetite: yes    Any problems or pain: no    Do you feel like you coping well: fair    Is baby sleeping:fair    How is baby eating: good    How did first pediatrician visit go: good    EPPDS:   Postpartum Depression Scale 2021   I have been able to laugh and see the funny side of things As much as I always could   I have looked forward with enjoyment to things As much as I ever did   I have blamed myself unnecessarily when things went wrong Yes, some of the time   I have been anxious or worried for no good reason Hardly ever   I have felt scared or panicky for no good reason No, not much   I haven't been able to cope lately No, most of the time I have coped quite well   I have been so unhappy that I have had difficulty sleeping Not at all   I have felt sad or miserable No, not at all   I have been so unhappy that I have been crying Only occasionally   The thought of harming myself has occurred to me Never   Total 6         No results found for this visit on 21. Nurse: Roxana ELLER    HPI:  PT presents for Post partum exam Follow up 6 weeks after delivery. Objective       GI: Abdomen soft, non-tender.  BS normal. No masses,  No organomegaly, healing well Extremities: normal strength, tone, and muscle mass   Breasts: Breast:lactating   Pelvic Exam: GENITAL/URINARY:  External Genitalia:  General appearance; normal, Hair distribution; normal, Lesions absent  Urethral Meatus:  Size normal, Location normal, Lesions absent, Prolapse absent  Urethra: Fullness absent, Masses absent  Bladder:  Fullness absent, Masses absent, Tenderness absent, Cystocele absent  Vagina:  General appearance normal, Estrogen effect normal, Discharge absent, Lesions absent, Pelvic support normal  Cervix:  General appearance normal, Lesions absent, Discharge absent, Tenderness absent, Enlargement absent, Nodularity absent  Uterus:  Size normal, Tenderness absent  Adenexa: Masses absent, Tenderness absent  Anus/Perineum:  Lesions absent and Masses absent                                     Assessment and Plan          Diagnosis Orders   1. Postpartum care and examination  POCT hemoglobin   2. Postpartum care following  delivery     3. Screening for cervical cancer  PAP SMEAR             I am having Morris Su. Bebo maintain her Prenatal Vit-Fe Fumarate-FA (PRENATAL VITAMINS PO), ondansetron, ferrous sulfate, butalbital-acetaminophen-caffeine, Hydrocort-Pramoxine (Perianal), ibuprofen, and docusate. Return in about 6 months (around 2022) for yearly. There are no Patient Instructions on file for this visit. Over 50% of time spent on counseling and care coordination on: see assessment and plan,  She was also counseled on her preventative health maintenance recommendations and follow-up.         FF time: 30 min      USMAN Jose CNM,2021 10:59 PM

## 2021-07-09 ENCOUNTER — HOSPITAL ENCOUNTER (OUTPATIENT)
Age: 36
Discharge: HOME | End: 2021-07-09
Payer: COMMERCIAL

## 2021-07-09 LAB
ESTIMATED AVERAGE GLUCOSE: 103 MG/DL
GLUCOSE BLD-MCNC: 81 MG/DL (ref 70–99)
GYNECOLOGY CYTOLOGY REPORT: NORMAL
HBA1C MFR BLD: 5.2 % (ref 4–6)
HPV SPECIMEN TYPE: NORMAL
HPV, GENOTYPE 16: NEGATIVE
HPV, GENOTYPE 18: NEGATIVE
OTHER HR HPV GENOTYPES: NEGATIVE

## 2021-07-09 PROCEDURE — 82947 ASSAY GLUCOSE BLOOD QUANT: CPT

## 2021-07-09 PROCEDURE — 83036 HEMOGLOBIN GLYCOSYLATED A1C: CPT

## 2021-07-09 PROCEDURE — 82985 ASSAY OF GLYCATED PROTEIN: CPT

## 2021-07-09 PROCEDURE — 36415 COLL VENOUS BLD VENIPUNCTURE: CPT

## 2021-07-12 LAB — FRUCTOSAMINE: 234 UMOL/L (ref 170–285)

## 2021-07-20 ENCOUNTER — PATIENT MESSAGE (OUTPATIENT)
Dept: OBGYN | Age: 36
End: 2021-07-20

## 2021-07-20 RX ORDER — NYSTATIN 100000 U/G
CREAM TOPICAL
Qty: 1 TUBE | Refills: 1 | Status: SHIPPED | OUTPATIENT
Start: 2021-07-20 | End: 2021-11-05 | Stop reason: ALTCHOICE

## 2021-07-20 NOTE — TELEPHONE ENCOUNTER
From: Bobby Amin  To: USMAN Gunderson - MAMTAM  Sent: 7/20/2021 2:44 PM EDT  Subject: Prescription Question    Hi,  Susana still has thrush and  said I should get treated for it too. Could you send a prescription to [facility] please?   Thanks  [employer]

## 2021-08-02 DIAGNOSIS — B37.9 YEAST INFECTION: Primary | ICD-10-CM

## 2021-08-02 RX ORDER — FLUCONAZOLE 100 MG/1
100 TABLET ORAL 2 TIMES DAILY
Qty: 20 TABLET | Refills: 0 | Status: SHIPPED | OUTPATIENT
Start: 2021-08-02 | End: 2021-08-12

## 2021-11-04 ENCOUNTER — OFFICE VISIT (OUTPATIENT)
Dept: OBGYN | Age: 36
End: 2021-11-04
Payer: COMMERCIAL

## 2021-11-04 VITALS
SYSTOLIC BLOOD PRESSURE: 122 MMHG | BODY MASS INDEX: 29.6 KG/M2 | WEIGHT: 173.4 LBS | HEIGHT: 64 IN | DIASTOLIC BLOOD PRESSURE: 74 MMHG

## 2021-11-04 DIAGNOSIS — N92.1 MENORRHAGIA WITH IRREGULAR CYCLE: Primary | ICD-10-CM

## 2021-11-04 PROCEDURE — 99213 OFFICE O/P EST LOW 20 MIN: CPT | Performed by: ADVANCED PRACTICE MIDWIFE

## 2021-11-04 RX ORDER — ACETAMINOPHEN AND CODEINE PHOSPHATE 120; 12 MG/5ML; MG/5ML
1 SOLUTION ORAL DAILY
Qty: 84 TABLET | Refills: 3 | Status: SHIPPED | OUTPATIENT
Start: 2021-11-04 | End: 2022-04-30 | Stop reason: ALTCHOICE

## 2021-11-04 NOTE — PROGRESS NOTES
PROBLEM VISIT     Date of service: 2021    Holland Xavier  Is a 39 y.o.  female    PT's PCP is: USMAN Altamirano - BRENDA     : 1985                                             Subjective:       No LMP recorded. OB History    Para Term  AB Living   5 2 1 1 2 3   SAB TAB Ectopic Molar Multiple Live Births   2       1 2      # Outcome Date GA Lbr Luis/2nd Weight Sex Delivery Anes PTL Lv   5A  21 35w2d  5 lb 2 oz (2.324 kg) M CS-LTranv Spinal Y KHADIJAH   5B  21 35w2d  5 lb 8.7 oz (2.515 kg) F CS-LTranv Spinal Y KHADIJAH   4 Term 10/29/13 39w2d  7 lb 9.2 oz (3.435 kg) M CS-Unspec      3 2012 6w0d          2 2011 6w0d          1                  Social History     Tobacco Use   Smoking Status Never Smoker   Smokeless Tobacco Never Used        Social History     Substance and Sexual Activity   Alcohol Use No       Social History     Substance and Sexual Activity   Sexual Activity Yes    Partners: Male       Allergies: Adhesive tape and Sulfa antibiotics    Chief Complaint   Patient presents with    Menstrual Problem     Discuss cycle control, patient is currently nursing twins. Last Yearly:  2021    Last pap: 2021    Last HPV: 2021    Have you had a positive covid test: No    Have you had the covid immunization: Yes    PE:  Vital Signs  Blood pressure 122/74, height 5' 4\" (1.626 m), weight 173 lb 6.4 oz (78.7 kg), currently breastfeeding. Estimated body mass index is 29.76 kg/m² as calculated from the following:    Height as of this encounter: 5' 4\" (1.626 m). Weight as of this encounter: 173 lb 6.4 oz (78.7 kg). No data recorded      NURSE: Roxana ELLER    HPI: here to discuss cycle control, has history of heavy periods      PT denies fever, chills, nausea and vomiting       Objective: No acute distress  Excellent communications  Well-nourished    Results reviewed today:    No results found for this visit on 21. Assessment and Plan          Diagnosis Orders   1. Menorrhagia with irregular cycle  norethindrone (ORTHO MICRONOR) 0.35 MG tablet   2. Lactating mother         is considering nexplanon, reviewed use      I am having Merrie McburneyTree Lagunas start on norethindrone. I am also having her maintain her Prenatal Vit-Fe Fumarate-FA (PRENATAL VITAMINS PO), ondansetron, ferrous sulfate, butalbital-acetaminophen-caffeine, Hydrocort-Pramoxine (Perianal), ibuprofen, docusate, and nystatin. No follow-ups on file. There are no Patient Instructions on file for this visit. Over 50% of time spent on counseling and care coordination on: see assessment and plan,  She was also counseled on her preventative health maintenance recommendations and follow-up.         FF time: 20 min      Ratna Guevara, [id number] 9:56 AM

## 2021-11-17 ENCOUNTER — HOSPITAL ENCOUNTER (OUTPATIENT)
Age: 36
Setting detail: SPECIMEN
Discharge: HOME | End: 2021-11-17

## 2021-11-17 LAB
CHOLESTEROL, FASTING: 130 MG/DL
CHOLESTEROL/HDL RATIO: 2.7
GLUCOSE FASTING: 73 MG/DL (ref 70–99)
HDLC SERPL-MCNC: 49 MG/DL
LDL CHOLESTEROL: 71 MG/DL (ref 0–130)
TRIGLYCERIDE, FASTING: 49 MG/DL
VLDLC SERPL CALC-MCNC: NORMAL MG/DL (ref 1–30)

## 2022-03-24 ENCOUNTER — TELEPHONE (OUTPATIENT)
Dept: OBGYN | Age: 37
End: 2022-03-24

## 2022-03-24 DIAGNOSIS — N91.2 AMENORRHEA: Primary | ICD-10-CM

## 2022-04-18 NOTE — TELEPHONE ENCOUNTER
appt made for 4/28/2022. HCG ordered placed and pt aware to have done in the am the day prior to her appt.

## 2022-04-27 ENCOUNTER — HOSPITAL ENCOUNTER (OUTPATIENT)
Age: 37
Discharge: HOME | End: 2022-04-27
Payer: COMMERCIAL

## 2022-04-27 DIAGNOSIS — N91.2 AMENORRHEA: ICD-10-CM

## 2022-04-27 LAB — HCG QUANTITATIVE: <1 MIU/ML

## 2022-04-27 PROCEDURE — 84702 CHORIONIC GONADOTROPIN TEST: CPT

## 2022-04-27 PROCEDURE — 36415 COLL VENOUS BLD VENIPUNCTURE: CPT

## 2022-04-28 ENCOUNTER — PROCEDURE VISIT (OUTPATIENT)
Dept: OBGYN | Age: 37
End: 2022-04-28
Payer: COMMERCIAL

## 2022-04-28 VITALS
BODY MASS INDEX: 31.24 KG/M2 | WEIGHT: 183 LBS | SYSTOLIC BLOOD PRESSURE: 125 MMHG | DIASTOLIC BLOOD PRESSURE: 78 MMHG | HEIGHT: 64 IN

## 2022-04-28 DIAGNOSIS — N92.6 IRREGULAR MENSES: Primary | ICD-10-CM

## 2022-04-28 PROCEDURE — 11981 INSERTION DRUG DLVR IMPLANT: CPT | Performed by: ADVANCED PRACTICE MIDWIFE

## 2022-04-28 NOTE — PROGRESS NOTES
PROBLEM VISIT     Date of service: 2022    Vidhya Carson  Is a 39 y.o.  female    PT's PCP is: USMAN Cast - BRENDA     : 1985                                             Subjective:       Patient's last menstrual period was 2022 (approximate). OB History    Para Term  AB Living   5 2 1 1 2 3   SAB IAB Ectopic Molar Multiple Live Births   2       1 2      # Outcome Date GA Lbr Luis/2nd Weight Sex Delivery Anes PTL Lv   5A  21 35w2d  5 lb 2 oz (2.324 kg) M CS-LTranv Spinal Y KHADIJAH   5B  21 35w2d  5 lb 8.7 oz (2.515 kg) F CS-LTranv Spinal Y KHADIJAH   4 Term 10/29/13 39w2d  7 lb 9.2 oz (3.435 kg) M CS-Unspec      3 2012 6w0d          2 2011 6w0d          1                  Social History     Tobacco Use   Smoking Status Never Smoker   Smokeless Tobacco Never Used        Social History     Substance and Sexual Activity   Alcohol Use Yes    Comment: socail       Allergies: Adhesive tape and Sulfa antibiotics      Current Outpatient Medications:     norethindrone (ORTHO MICRONOR) 0.35 MG tablet, Take 1 tablet by mouth daily, Disp: 84 tablet, Rfl: 3    Prenatal Vit-Fe Fumarate-FA (PRENATAL VITAMINS PO), Take by mouth, Disp: , Rfl:     Social History     Substance and Sexual Activity   Sexual Activity Yes    Partners: Male    Birth control/protection: Pill       Last Yearly:  2022    Last pap: 2021    Last HPV: 2021    Have you had a positive covid test: No    Have you had the covid immunization: Yes    Chief Complaint   Patient presents with    Menstrual Problem     Patient presents for insertion of Nexplanon, patient had negative serum HCG 2022. PE:  Vital Signs  Blood pressure 125/78, height 5' 4\" (1.626 m), weight 183 lb (83 kg), last menstrual period 2022, currently breastfeeding.   Estimated body mass index is 31.41 kg/m² as calculated from the following:    Height as of this encounter: 5' 4\" (1.626 m). Weight as of this encounter: 183 lb (83 kg). No data recorded    NURSE: Cristian ELLER    Patient supplied nexplanon    39 y.o.  2022      Nichole Mayen is a 39 y.o. female is requesting to have her an Nexplanon placed. She does have any other problems today, irregular menses. She is switching from POPs. She is aware that she may have irregular bleeding. We discussed that sometimes women will need additional doses of oral contraceptive pills to control the irregular bleeding. Past Medical History:   Diagnosis Date    Diabetes mellitus ([facility] [address]     Gestational diabetes    Heart abnormality     thrombosed hemorrhoid    Kidney calculi          Past Surgical History:   Procedure Laterality Date     SECTION  10/29/2013     SECTION N/A 2021     SECTION performed by Demetrius Crowell DO at [facility] OR    CHOLECYSTECTOMY      DILATION AND CURETTAGE OF UTERUS      DILATION AND CURETTAGE OF UTERUS N/A 2019    DILATATION AND CURETTAGE SUCTION performed by Peggy Reyes MD at [facility] N/A 2021    HEMORRHOIDECTOMY EXCISION [place] [address] performed by Gisselle Rodriguez DO at [facility] OR       Family History   Problem Relation Age of Onset    Other Father     High Blood Pressure Father     Diabetes Paternal Grandmother        Social History     Tobacco Use    Smoking status: Never Smoker    Smokeless tobacco: Never Used   Vaping Use    Vaping Use: Never used   Substance Use Topics    Alcohol use: Yes     Comment: socail    Drug use: Not Currently       Current Outpatient Medications on File Prior to Visit   Medication Sig Dispense Refill    norethindrone (ORTHO MICRONOR) 0.35 MG tablet Take 1 tablet by mouth daily 84 tablet 3    Prenatal Vit-Fe Fumarate-FA (PRENATAL VITAMINS PO) Take by mouth       No current facility-administered medications on file prior to visit.        Allergies as of 2022 - Fully Reviewed 2022   Allergen Reaction Noted    Adhesive tape Dermatitis 2019    Sulfa antibiotics  08/10/2013         OB History    Para Term  AB Living   5 2 1 1 2 3   SAB IAB Ectopic Molar Multiple Live Births   2       1 2      # Outcome Date GA Lbr Luis/2nd Weight Sex Delivery Anes PTL Lv   5A  21 35w2d  5 lb 2 oz (2.324 kg) M CS-LTranv Spinal Y KHADIJAH   5B  21 35w2d  5 lb 8.7 oz (2.515 kg) F CS-LTranv Spinal Y KHADIJAH   4 Term 10/29/13 39w2d  7 lb 9.2 oz (3.435 kg) M CS-Unspec      3 SAB  6w0d          2 SAB  6w0d          1                   Blood pressure 125/78, height 5' 4\" (1.626 m), weight 183 lb (83 kg), last menstrual period 2022, currently breastfeeding. PROCEDURE:  The patient was positioned comfortably on our procedure table. She was consented earlier in the appointment and the procedure risk and complications were reviewed. She was marked. A sterile prep and drape was completed and a 1% xylocaine for local anesthetic was utilized, 1 ml. The nexplanon randy was noted within the applicator. The Nexplanon was inserted per protocol. Placement was confirmed by palpating the device by me and the patient. A steri strip was applied. A pressure wrap was applied. The patient tolerated the procedure well. Formal restrictions were discussed in detail. She is to notify our office if any swelling, redness, temperature, or limb restriction or numbness. No baths, Pools or Lakes until Follow up. She may take Tylenol for any pain. Assessment:   Diagnosis Orders   1.  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

## 2022-11-28 ENCOUNTER — HOSPITAL ENCOUNTER (OUTPATIENT)
Age: 37
Setting detail: SPECIMEN
Discharge: HOME | End: 2022-11-28

## 2022-11-28 LAB
ABSOLUTE EOS #: 0.12 K/UL (ref 0–0.44)
ABSOLUTE IMMATURE GRANULOCYTE: 0.04 K/UL (ref 0–0.3)
ABSOLUTE LYMPH #: 1.67 K/UL (ref 1.1–3.7)
ABSOLUTE MONO #: 0.69 K/UL (ref 0.1–1.2)
ALBUMIN SERPL-MCNC: 4.4 G/DL (ref 3.5–5.2)
ALBUMIN/GLOBULIN RATIO: 1.4 (ref 1–2.5)
ALP BLD-CCNC: 66 U/L (ref 35–104)
ALT SERPL-CCNC: 14 U/L (ref 5–33)
ANION GAP SERPL CALCULATED.3IONS-SCNC: 15 MMOL/L (ref 9–17)
AST SERPL-CCNC: 19 U/L
BASOPHILS # BLD: 1 % (ref 0–2)
BASOPHILS ABSOLUTE: 0.06 K/UL (ref 0–0.2)
BILIRUB SERPL-MCNC: 0.6 MG/DL (ref 0.3–1.2)
BUN BLDV-MCNC: 12 MG/DL (ref 6–20)
CALCIUM SERPL-MCNC: 9.1 MG/DL (ref 8.6–10.4)
CHLORIDE BLD-SCNC: 103 MMOL/L (ref 98–107)
CHOLESTEROL, FASTING: 106 MG/DL
CHOLESTEROL/HDL RATIO: 3.1
CO2: 23 MMOL/L (ref 20–31)
CREAT SERPL-MCNC: 0.71 MG/DL (ref 0.5–0.9)
EOSINOPHILS RELATIVE PERCENT: 1 % (ref 1–4)
GFR SERPL CREATININE-BSD FRML MDRD: >60 ML/MIN/1.73M2
GLUCOSE BLD-MCNC: 110 MG/DL (ref 70–99)
HCT VFR BLD CALC: 42.7 % (ref 36.3–47.1)
HDLC SERPL-MCNC: 34 MG/DL
HEMOGLOBIN: 13.2 G/DL (ref 11.9–15.1)
IMMATURE GRANULOCYTES: 0 %
LDL CHOLESTEROL: 48 MG/DL (ref 0–130)
LYMPHOCYTES # BLD: 18 % (ref 24–43)
MCH RBC QN AUTO: 30.1 PG (ref 25.2–33.5)
MCHC RBC AUTO-ENTMCNC: 30.9 G/DL (ref 28.4–34.8)
MCV RBC AUTO: 97.3 FL (ref 82.6–102.9)
MONOCYTES # BLD: 7 % (ref 3–12)
NRBC AUTOMATED: 0 PER 100 WBC
PDW BLD-RTO: 12.6 % (ref 11.8–14.4)
PLATELET # BLD: 316 K/UL (ref 138–453)
PMV BLD AUTO: 11 FL (ref 8.1–13.5)
POTASSIUM SERPL-SCNC: 4.3 MMOL/L (ref 3.7–5.3)
RBC # BLD: 4.39 M/UL (ref 3.95–5.11)
SEG NEUTROPHILS: 73 % (ref 36–65)
SEGMENTED NEUTROPHILS ABSOLUTE COUNT: 6.86 K/UL (ref 1.5–8.1)
SODIUM BLD-SCNC: 141 MMOL/L (ref 135–144)
T3 TOTAL: 129 NG/DL (ref 60–181)
TOTAL PROTEIN: 7.6 G/DL (ref 6.4–8.3)
TRIGLYCERIDE, FASTING: 122 MG/DL
TSH SERPL DL<=0.05 MIU/L-ACNC: 1.42 UIU/ML (ref 0.3–5)
WBC # BLD: 9.4 K/UL (ref 3.5–11.3)

## 2022-11-29 LAB — T4 TOTAL: 7.3 UG/DL (ref 4.5–10.9)

## 2022-12-28 RX ORDER — FLUCONAZOLE 150 MG/1
150 TABLET ORAL ONCE
Qty: 1 TABLET | Refills: 0 | Status: SHIPPED | OUTPATIENT
Start: 2022-12-28 | End: 2022-12-28

## 2023-12-14 ENCOUNTER — OFFICE VISIT (OUTPATIENT)
Dept: OBGYN | Age: 38
End: 2023-12-14
Payer: COMMERCIAL

## 2023-12-14 VITALS
DIASTOLIC BLOOD PRESSURE: 74 MMHG | WEIGHT: 202 LBS | SYSTOLIC BLOOD PRESSURE: 116 MMHG | HEIGHT: 64 IN | BODY MASS INDEX: 34.49 KG/M2

## 2023-12-14 DIAGNOSIS — D25.9 UTERINE LEIOMYOMA, UNSPECIFIED LOCATION: ICD-10-CM

## 2023-12-14 DIAGNOSIS — R68.82 DECREASED LIBIDO: ICD-10-CM

## 2023-12-14 DIAGNOSIS — N92.6 IRREGULAR MENSES: ICD-10-CM

## 2023-12-14 DIAGNOSIS — Z01.419 ENCOUNTER FOR ANNUAL ROUTINE GYNECOLOGICAL EXAMINATION: Primary | ICD-10-CM

## 2023-12-14 PROCEDURE — 99395 PREV VISIT EST AGE 18-39: CPT | Performed by: ADVANCED PRACTICE MIDWIFE

## 2023-12-14 RX ORDER — METFORMIN HYDROCHLORIDE 500 MG/1
TABLET, EXTENDED RELEASE ORAL
COMMUNITY
Start: 2023-11-16

## 2023-12-14 RX ORDER — BUSPIRONE HYDROCHLORIDE 5 MG/1
TABLET ORAL
COMMUNITY
Start: 2023-12-04

## 2023-12-14 RX ORDER — AMOXICILLIN AND CLAVULANATE POTASSIUM 875; 125 MG/1; MG/1
TABLET, FILM COATED ORAL
COMMUNITY
Start: 2023-12-13

## 2023-12-14 ASSESSMENT — PATIENT HEALTH QUESTIONNAIRE - PHQ9
2. FEELING DOWN, DEPRESSED OR HOPELESS: 1
SUM OF ALL RESPONSES TO PHQ9 QUESTIONS 1 & 2: 2
SUM OF ALL RESPONSES TO PHQ QUESTIONS 1-9: 2
SUM OF ALL RESPONSES TO PHQ QUESTIONS 1-9: 2
1. LITTLE INTEREST OR PLEASURE IN DOING THINGS: 1
SUM OF ALL RESPONSES TO PHQ QUESTIONS 1-9: 2
SUM OF ALL RESPONSES TO PHQ QUESTIONS 1-9: 2

## 2023-12-14 NOTE — PROGRESS NOTES
results found for this visit on 12/14/23. PHQ-9 Total Score: 2 (12/14/2023  1:16 PM)      NURSE: Cristian ELLER    HPI: here for annual gyn exam, c/o irregular bleeding on nexplanon and intermittantly painful as well. C/o decreased libido, since postpartum and nexplanon placement    Review of Systems   Constitutional: Negative. HENT:  Negative for congestion. Respiratory:  Negative for shortness of breath. Cardiovascular:  Negative for chest pain. Gastrointestinal:  Negative for abdominal pain. Genitourinary:  Positive for menstrual problem. Negative for dysuria. Decreased libido   Musculoskeletal:  Negative for back pain. Skin:  Negative for rash. Neurological:  Negative for headaches. Psychiatric/Behavioral:  The patient is not nervous/anxious. Objective  Lymphatic:   no lymphadenopathy  Heent:   negative   Cor: regular rate and rhythm, no murmurs              Pul:clear to auscultation bilaterally- no wheezes, rales or rhonchi, normal air movement, no respiratory distress      GI: Abdomen soft, non-tender. BS normal. No masses,  No organomegaly           Extremities: normal strength, tone, and muscle mass   Breasts: Breast:normal appearance, no masses or tenderness   Pelvic Exam: GENITAL/URINARY:  External Genitalia:  General appearance; normal, Hair distribution; normal, Lesions absent  Urethral Meatus:  Size normal, Location normal, Lesions absent, Prolapse absent  Urethra: Fullness absent, Masses absent  Bladder:  Fullness absent, Masses absent, Tenderness absent, Cystocele absent  Vagina:  General appearance normal, Estrogen effect normal, Discharge absent, Lesions absent, Pelvic support normal  Cervix:  General appearance normal, Lesions absent, Discharge absent, Tenderness absent, Enlargement absent, Nodularity absent  Uterus:  Size normal, Tenderness absent  Adenexa:   Masses absent, Tenderness absent  Anus/Perineum:  Lesions absent and Masses absent

## 2024-01-03 ENCOUNTER — OFFICE VISIT (OUTPATIENT)
Dept: OBGYN | Age: 39
End: 2024-01-03
Payer: COMMERCIAL

## 2024-01-03 ENCOUNTER — TELEPHONE (OUTPATIENT)
Dept: OBGYN | Age: 39
End: 2024-01-03

## 2024-01-03 VITALS
WEIGHT: 204.2 LBS | BODY MASS INDEX: 34.86 KG/M2 | SYSTOLIC BLOOD PRESSURE: 116 MMHG | DIASTOLIC BLOOD PRESSURE: 76 MMHG | HEIGHT: 64 IN

## 2024-01-03 DIAGNOSIS — R10.9 ABDOMINAL CRAMPING: Primary | ICD-10-CM

## 2024-01-03 DIAGNOSIS — N92.6 IRREGULAR MENSES: ICD-10-CM

## 2024-01-03 PROCEDURE — 99213 OFFICE O/P EST LOW 20 MIN: CPT | Performed by: ADVANCED PRACTICE MIDWIFE

## 2024-01-03 NOTE — TELEPHONE ENCOUNTER
Would you please look at this imaging and let me know what you think.  This patient really wanted a salpingectomy/ ablation procedure and I told her we'd have to look at her sono.  Her uterus almost appears as didelphic with a very deep septum.  She would like to avoid hyst but is not happy with her nexplanon and has problems with painful periods and decreased libido. I think she would like to avoid other combination hormonal cycle control and an IUD could not be placed in that uterus.  Any ideas?   Is there any older ablation tools that would work?

## 2024-01-03 NOTE — PROGRESS NOTES
PROBLEM VISIT     Date of service: 1/3/2024    Dulce Lagunas  Is a 38 y.o. , female    PT's PCP is: Kath Manzano APRN - CNP     : 1985                                             Subjective:       Patient's last menstrual period was 2023 (approximate).     OB History    Para Term  AB Living   5 2 1 1 2 3   SAB IAB Ectopic Molar Multiple Live Births   2       1 2      # Outcome Date GA Lbr Luis/2nd Weight Sex Delivery Anes PTL Lv   5A  21 35w2d  2.324 kg (5 lb 2 oz) M CS-LTranv Spinal Y KHADIJAH   5B  21 35w2d  2.515 kg (5 lb 8.7 oz) F CS-LTranv Spinal Y KHADIJAH   4  2020           3 Term 10/29/13 39w2d  3.435 kg (7 lb 9.2 oz) M CS-Unspec      2 2012 6w0d          1 2011 6w0d               Social History     Tobacco Use   Smoking Status Never   Smokeless Tobacco Never        Social History     Substance and Sexual Activity   Alcohol Use Yes    Comment: socail       Social History     Substance and Sexual Activity   Sexual Activity Yes    Partners: Male    Birth control/protection: Implant       Allergies: Adhesive tape and Sulfa antibiotics    Chief Complaint   Patient presents with    Menstrual Problem     Follow up in house ultrasound. H/O of off and on  periods and pain. Patient has Nexplanon that was placed 2022.        Last Yearly date:  2023    Last pap date and results: 2021 negative    Last HPV date and results: 2021 negative    Have you had a positive covid test: Yes    Have you had the covid immunization: Yes    PE:  Vital Signs  Blood pressure 116/76, height 1.626 m (5' 4\"), weight 92.6 kg (204 lb 3.2 oz), last menstrual period 2023, not currently breastfeeding.  Estimated body mass index is 35.05 kg/m² as calculated from the following:    Height as of this encounter: 1.626 m (5' 4\").    Weight as of this encounter: 92.6 kg (204 lb 3.2 oz).    No data recorded      NURSE: Cristian ELLER    HPI: here for w/u

## 2024-02-06 ENCOUNTER — OFFICE VISIT (OUTPATIENT)
Dept: OBGYN | Age: 39
End: 2024-02-06
Payer: COMMERCIAL

## 2024-02-06 VITALS
BODY MASS INDEX: 34.31 KG/M2 | DIASTOLIC BLOOD PRESSURE: 74 MMHG | SYSTOLIC BLOOD PRESSURE: 122 MMHG | HEIGHT: 64 IN | WEIGHT: 201 LBS

## 2024-02-06 DIAGNOSIS — D25.9 UTERINE LEIOMYOMA, UNSPECIFIED LOCATION: ICD-10-CM

## 2024-02-06 DIAGNOSIS — N80.03 ADENOMYOSIS: ICD-10-CM

## 2024-02-06 DIAGNOSIS — N92.6 IRREGULAR MENSES: Primary | ICD-10-CM

## 2024-02-06 DIAGNOSIS — Q51.3 BICORNUATE UTERUS: ICD-10-CM

## 2024-02-06 DIAGNOSIS — R10.2 PELVIC PAIN: ICD-10-CM

## 2024-02-06 PROCEDURE — 99213 OFFICE O/P EST LOW 20 MIN: CPT | Performed by: OBSTETRICS & GYNECOLOGY

## 2024-02-06 NOTE — PROGRESS NOTES
Birth control/protection: Implant       REVIEW OF SYSTEMS:  Review of Systems   Constitutional:  Negative for chills and fever.   Genitourinary:  Positive for menstrual problem and pelvic pain. Negative for dysuria and vaginal discharge.       PHYSICAL EXAM:  /74   Ht 1.626 m (5' 4\")   Wt 91.2 kg (201 lb)   BMI 34.50 kg/m²   Physical Exam  Constitutional:       Appearance: Normal appearance.   HENT:      Head: Normocephalic and atraumatic.   Eyes:      Extraocular Movements: Extraocular movements intact.      Pupils: Pupils are equal, round, and reactive to light.   Cardiovascular:      Rate and Rhythm: Normal rate.   Pulmonary:      Effort: Pulmonary effort is normal.   Musculoskeletal:         General: Normal range of motion.   Neurological:      Mental Status: She is alert and oriented to person, place, and time.   Skin:     General: Skin is warm and dry.   Psychiatric:         Mood and Affect: Mood normal.         Behavior: Behavior normal.     The patient, Dulce Lagunas is a 38 y.o. female, was seen with a total time spent of 20 minutes for the visit on this date of service by the E/M provider. The time component had both face to face and non face to face time spent in determining the total time component.  Counseling and education regarding her diagnosis listed below and her options regarding those diagnoses were also included in determining her time component.      Diagnosis Orders   1. Irregular menses        2. Pelvic pain        3. Bicornuate uterus        4. Adenomyosis        5. Uterine leiomyoma, unspecified location             The patient had her preventative health maintenance recommendations and follow-up reviewed with her at the completion of her visit.      ASSESSMENT:      1. Irregular menses    2. Pelvic pain    3. Bicornuate uterus    4. Adenomyosis    5. Uterine leiomyoma, unspecified location        PLAN:  No orders of the defined types were placed in this encounter.    Return if

## 2024-02-26 RX ORDER — ACETAMINOPHEN AND CODEINE PHOSPHATE 120; 12 MG/5ML; MG/5ML
1 SOLUTION ORAL DAILY
Qty: 84 TABLET | Refills: 1 | Status: SHIPPED | OUTPATIENT
Start: 2024-02-26

## 2024-03-12 ENCOUNTER — TELEPHONE (OUTPATIENT)
Dept: OBGYN CLINIC | Age: 39
End: 2024-03-12

## 2024-03-12 NOTE — TELEPHONE ENCOUNTER
Dulce Lagunas     1985        female    [address] [place] [address]         [id number]           Legal Guardian NO   If yes, Name:       Skilled Facility NO     If yes, Name:                                             Home Phone: [phone]         Cell Phone:    Telephone Information:   Mobile [phone]                                           Surgeon: Chance Surgery Date: 7/15/2024                    Time: as indicated    Procedure: Hysterectomy vaginal laparoscopic robotic, possible bilateral Salping-oophorectomy, possible Laparoscopic Colpopexy  Duration: 100 minutes    Diagnosis: irregular menses, pelvic pain, bicornuate uterus, adenomyosis, uterine fibroid  CPT Codes: 65712    Important Medical History:  In Epic    First Assistant Yes, Maria Guadalupe  Special Inst/Equip/Implants: Regular    Nickel allergy NO:    NO  Latex Allergy: No         Cardiac Device:  No  If yes, need most recent pacemaker interrogation from Cardiologist:  Type of pacemaker:    Anesthesia:    General                       Admission Type:  Same Day                        Admit Prior to Day of Surgery: No    Case Location:  Main OR            Preadmission Testing:  Phone Call             PAT Date and Time: as indicated    Need Preop Cardiac Clearance: No  Need Pre-op/Medical Clearance:NO    Does Patient have Cardiologist/physician? Name of Physician:    NA    Special Needs Communication:  Bear Lift NO          needed NO            Does patient sign for self Yes

## 2024-03-13 ENCOUNTER — TELEPHONE (OUTPATIENT)
Dept: OBGYN CLINIC | Age: 39
End: 2024-03-13

## 2024-03-13 DIAGNOSIS — Z01.812 ENCOUNTER FOR PRE-OPERATIVE LABORATORY TESTING: Primary | ICD-10-CM

## 2024-03-13 DIAGNOSIS — N92.6 IRREGULAR MENSES: ICD-10-CM

## 2024-03-13 DIAGNOSIS — N80.03 ADENOMYOSIS: ICD-10-CM

## 2024-03-13 DIAGNOSIS — R10.2 PELVIC PAIN: ICD-10-CM

## 2024-03-13 DIAGNOSIS — Q51.3 BICORNUATE UTERUS: ICD-10-CM

## 2024-03-13 DIAGNOSIS — N92.6 IRREGULAR MENSES: Primary | ICD-10-CM

## 2024-03-13 DIAGNOSIS — Z01.812 ENCOUNTER FOR PRE-OPERATIVE LABORATORY TESTING: ICD-10-CM

## 2024-03-13 DIAGNOSIS — E11.9 TYPE 2 DIABETES MELLITUS WITHOUT COMPLICATION, WITHOUT LONG-TERM CURRENT USE OF INSULIN (HCC): ICD-10-CM

## 2024-03-13 NOTE — TELEPHONE ENCOUNTER
Patient called Vilma on 3/12 stating that she was expecting a call from our office to schedule a hysterectomy.  Patient had a visit with Dr. Yanez recently and was going to consider surgery vs medication.  She is taking the Micronor, but wanted to get penciled in for a hysterectomy.  I attempted to call patient to confirm surgery details and had to leave a message.  She is penciled in for a RA TLH, poss BSO, poss Lap Colpo at [facility] on 7/15/2024.  She is aware that a nurse from [facility] will call her to complete a phone interview and arrange COVID testing if needed.  She will let me know if she is needing a note for work.  Patient will come in to see Dr. Yanez prior to surgery and will get labs at that visit.  We will also follow up 2 and 6 weeks after surgery.  Patient instructed to call back to schedule her visits.  Patient to call with any further questions/concerns.

## 2024-03-21 RX ORDER — FLUCONAZOLE 150 MG/1
150 TABLET ORAL DAILY PRN
Qty: 3 TABLET | Refills: 0 | Status: SHIPPED | OUTPATIENT
Start: 2024-03-21 | End: 2024-03-24

## 2024-05-10 ENCOUNTER — HOSPITAL ENCOUNTER (OUTPATIENT)
Age: 39
Setting detail: SPECIMEN
Discharge: HOME | End: 2024-05-10

## 2024-05-10 LAB
ALBUMIN SERPL-MCNC: 4.4 G/DL (ref 3.5–5.2)
ALBUMIN/GLOB SERPL: 2 {RATIO} (ref 1–2.5)
ALP SERPL-CCNC: 61 U/L (ref 35–104)
ALT SERPL-CCNC: 13 U/L (ref 10–35)
ANION GAP SERPL CALCULATED.3IONS-SCNC: 12 MMOL/L (ref 9–16)
AST SERPL-CCNC: 22 U/L (ref 10–35)
BASOPHILS # BLD: 0.06 K/UL (ref 0–0.2)
BASOPHILS NFR BLD: 1 % (ref 0–2)
BILIRUB SERPL-MCNC: 0.4 MG/DL (ref 0–1.2)
BUN SERPL-MCNC: 13 MG/DL (ref 6–20)
CALCIUM SERPL-MCNC: 9.1 MG/DL (ref 8.6–10.4)
CHLORIDE SERPL-SCNC: 104 MMOL/L (ref 98–107)
CHOLEST SERPL-MCNC: 108 MG/DL (ref 0–199)
CHOLESTEROL/HDL RATIO: 3
CO2 SERPL-SCNC: 25 MMOL/L (ref 20–31)
CREAT SERPL-MCNC: 0.9 MG/DL (ref 0.5–0.9)
EOSINOPHIL # BLD: 0.18 K/UL (ref 0–0.44)
EOSINOPHILS RELATIVE PERCENT: 2 % (ref 1–4)
ERYTHROCYTE [DISTWIDTH] IN BLOOD BY AUTOMATED COUNT: 13.2 % (ref 11.8–14.4)
GFR, ESTIMATED: 86 ML/MIN/1.73M2
GLUCOSE SERPL-MCNC: 131 MG/DL (ref 74–99)
HCT VFR BLD AUTO: 41.5 % (ref 36.3–47.1)
HDLC SERPL-MCNC: 35 MG/DL
HGB BLD-MCNC: 13 G/DL (ref 11.9–15.1)
IMM GRANULOCYTES # BLD AUTO: 0.03 K/UL (ref 0–0.3)
IMM GRANULOCYTES NFR BLD: 0 %
IRON SATN MFR SERPL: 16 % (ref 20–55)
IRON SERPL-MCNC: 49 UG/DL (ref 37–145)
LDLC SERPL CALC-MCNC: 54 MG/DL (ref 0–100)
LYMPHOCYTES NFR BLD: 1.88 K/UL (ref 1.1–3.7)
LYMPHOCYTES RELATIVE PERCENT: 25 % (ref 24–43)
MCH RBC QN AUTO: 29.7 PG (ref 25.2–33.5)
MCHC RBC AUTO-ENTMCNC: 31.3 G/DL (ref 28.4–34.8)
MCV RBC AUTO: 94.7 FL (ref 82.6–102.9)
MONOCYTES NFR BLD: 0.62 K/UL (ref 0.1–1.2)
MONOCYTES NFR BLD: 8 % (ref 3–12)
NEUTROPHILS NFR BLD: 64 % (ref 36–65)
NEUTS SEG NFR BLD: 4.79 K/UL (ref 1.5–8.1)
NRBC BLD-RTO: 0 PER 100 WBC
PLATELET # BLD AUTO: 275 K/UL (ref 138–453)
PMV BLD AUTO: 11.3 FL (ref 8.1–13.5)
POTASSIUM SERPL-SCNC: 3.9 MMOL/L (ref 3.7–5.3)
PROT SERPL-MCNC: 7.3 G/DL (ref 6.6–8.7)
RBC # BLD AUTO: 4.38 M/UL (ref 3.95–5.11)
SODIUM SERPL-SCNC: 141 MMOL/L (ref 136–145)
T3FREE SERPL-MCNC: 3.7 PG/ML (ref 2–4.4)
T4 FREE SERPL-MCNC: 1.2 NG/DL (ref 0.92–1.68)
TIBC SERPL-MCNC: 310 UG/DL (ref 250–450)
TRIGL SERPL-MCNC: 99 MG/DL (ref 0–149)
TSH SERPL DL<=0.05 MIU/L-ACNC: 1.63 UIU/ML (ref 0.27–4.2)
UNSATURATED IRON BINDING CAPACITY: 261 UG/DL (ref 112–347)
VLDLC SERPL CALC-MCNC: 20 MG/DL
WBC OTHER # BLD: 7.6 K/UL (ref 3.5–11.3)

## 2024-07-02 ENCOUNTER — OFFICE VISIT (OUTPATIENT)
Dept: OBGYN | Age: 39
End: 2024-07-02
Payer: COMMERCIAL

## 2024-07-02 VITALS
HEIGHT: 64 IN | DIASTOLIC BLOOD PRESSURE: 82 MMHG | SYSTOLIC BLOOD PRESSURE: 122 MMHG | BODY MASS INDEX: 34.31 KG/M2 | WEIGHT: 201 LBS

## 2024-07-02 DIAGNOSIS — Q51.3 BICORNUATE UTERUS: ICD-10-CM

## 2024-07-02 DIAGNOSIS — R10.2 PELVIC PAIN: ICD-10-CM

## 2024-07-02 DIAGNOSIS — N80.03 ADENOMYOSIS: ICD-10-CM

## 2024-07-02 DIAGNOSIS — N92.6 IRREGULAR MENSES: Primary | ICD-10-CM

## 2024-07-02 PROCEDURE — 99213 OFFICE O/P EST LOW 20 MIN: CPT | Performed by: OBSTETRICS & GYNECOLOGY

## 2024-07-02 RX ORDER — DOXYCYCLINE HYCLATE 100 MG/1
CAPSULE ORAL
COMMUNITY
Start: 2024-03-29 | End: 2024-07-03 | Stop reason: ALTCHOICE

## 2024-07-02 NOTE — PROGRESS NOTES
were placed in this encounter.    Return for RA TLH/poss BSO.       Electronically signed by Krystyna Jackson DO on 07/02/24

## 2024-07-02 NOTE — H&P (VIEW-ONLY)
DATE OF VISIT:  7/2/24    PATIENT NAME:  Dulce Lagunas     YOB: 1985    REASON FOR VISIT:    Chief Complaint   Patient presents with    Consultation     Pt here today to discuss surgery. Pt states no complaints or concerns today.         HISTORY OF PRESENT ILLNESS:  Pt with intermittent pelvic pain and irregular bleeding; has bicornuate uterus so iud not an option to control symptoms; has nexplanon but continues to have symptoms; has also noticed decreased libido since nexplanon; tried slynd rx but too expensive and has been on micronor since feb; pt interested in definitive treatment; disc'd ra tlh/poss bso; r/b/a reviewed; restrictions and recovery disc'd        Patient's last menstrual period was 06/27/2024.  Vitals:    07/02/24 1523   BP: 122/82   Position: Sitting   Weight: 91.2 kg (201 lb)   Height: 1.626 m (5' 4\")     Body mass index is 34.5 kg/m².  Allergies   Allergen Reactions    Adhesive Tape Dermatitis    Sulfa Antibiotics      Current Outpatient Medications   Medication Sig Dispense Refill    norethindrone (ORTHO MICRONOR) 0.35 MG tablet Take 1 tablet by mouth daily 84 tablet 1    metFORMIN (GLUCOPHAGE-XR) 500 MG extended release tablet TAKE 1 TABLET BY MOUTH EVERY DAY WITH FOOD      doxycycline hyclate (VIBRAMYCIN) 100 MG capsule  (Patient not taking: Reported on 7/2/2024)      Drospirenone 4 MG TABS Take 1 tablet by mouth daily (Patient not taking: Reported on 7/2/2024) 28 tablet 11     Current Facility-Administered Medications   Medication Dose Route Frequency Provider Last Rate Last Admin    etonogestrel (NEXPLANON) implant 68 mg  68 mg Subdermal Once Maryann Candelario, USMAN - MAMTAM   68 mg at 04/28/22 1445     Social History     Socioeconomic History    Marital status:      Spouse name: Spike    Number of children: None    Years of education: None    Highest education level: None   Tobacco Use    Smoking status: Never    Smokeless tobacco: Never   Vaping Use    Vaping Use:

## 2024-07-03 NOTE — PROGRESS NOTES
Patient instructed on the pre-operative, intra-operative, and post-operative process. Patient instructed on NPO status. Medication instructions and pre operative instruction sheet reviewed with the patient. CHG skin prep instructions reviewed with patient.    No

## 2024-07-12 ENCOUNTER — ANESTHESIA EVENT (OUTPATIENT)
Dept: OPERATING ROOM | Age: 39
End: 2024-07-12
Payer: COMMERCIAL

## 2024-07-15 ENCOUNTER — HOSPITAL ENCOUNTER (OUTPATIENT)
Age: 39
Setting detail: OUTPATIENT SURGERY
Discharge: HOME | End: 2024-07-15
Attending: OBSTETRICS & GYNECOLOGY | Admitting: OBSTETRICS & GYNECOLOGY
Payer: COMMERCIAL

## 2024-07-15 ENCOUNTER — ANESTHESIA (OUTPATIENT)
Dept: OPERATING ROOM | Age: 39
End: 2024-07-15
Payer: COMMERCIAL

## 2024-07-15 VITALS
HEIGHT: 64 IN | RESPIRATION RATE: 16 BRPM | TEMPERATURE: 97.5 F | HEART RATE: 78 BPM | SYSTOLIC BLOOD PRESSURE: 117 MMHG | BODY MASS INDEX: 34.31 KG/M2 | WEIGHT: 201 LBS | DIASTOLIC BLOOD PRESSURE: 72 MMHG | OXYGEN SATURATION: 96 %

## 2024-07-15 DIAGNOSIS — G89.18 POSTOPERATIVE PAIN: Primary | ICD-10-CM

## 2024-07-15 DIAGNOSIS — N92.6 IRREGULAR MENSES: ICD-10-CM

## 2024-07-15 DIAGNOSIS — R10.2 PELVIC PAIN: ICD-10-CM

## 2024-07-15 LAB — HCG UR QL: NEGATIVE

## 2024-07-15 PROCEDURE — 2580000003 HC RX 258: Performed by: NURSE ANESTHETIST, CERTIFIED REGISTERED

## 2024-07-15 PROCEDURE — 3600000009 HC SURGERY ROBOT BASE: Performed by: OBSTETRICS & GYNECOLOGY

## 2024-07-15 PROCEDURE — 6360000002 HC RX W HCPCS

## 2024-07-15 PROCEDURE — 3700000001 HC ADD 15 MINUTES (ANESTHESIA): Performed by: OBSTETRICS & GYNECOLOGY

## 2024-07-15 PROCEDURE — 3600000019 HC SURGERY ROBOT ADDTL 15MIN: Performed by: OBSTETRICS & GYNECOLOGY

## 2024-07-15 PROCEDURE — 7100000001 HC PACU RECOVERY - ADDTL 15 MIN: Performed by: OBSTETRICS & GYNECOLOGY

## 2024-07-15 PROCEDURE — S2900 ROBOTIC SURGICAL SYSTEM: HCPCS | Performed by: OBSTETRICS & GYNECOLOGY

## 2024-07-15 PROCEDURE — 2500000003 HC RX 250 WO HCPCS: Performed by: NURSE ANESTHETIST, CERTIFIED REGISTERED

## 2024-07-15 PROCEDURE — 6360000002 HC RX W HCPCS: Performed by: NURSE ANESTHETIST, CERTIFIED REGISTERED

## 2024-07-15 PROCEDURE — 2580000003 HC RX 258

## 2024-07-15 PROCEDURE — 7100000000 HC PACU RECOVERY - FIRST 15 MIN: Performed by: OBSTETRICS & GYNECOLOGY

## 2024-07-15 PROCEDURE — 6370000000 HC RX 637 (ALT 250 FOR IP): Performed by: NURSE ANESTHETIST, CERTIFIED REGISTERED

## 2024-07-15 PROCEDURE — 2500000003 HC RX 250 WO HCPCS

## 2024-07-15 PROCEDURE — 88307 TISSUE EXAM BY PATHOLOGIST: CPT

## 2024-07-15 PROCEDURE — 7100000011 HC PHASE II RECOVERY - ADDTL 15 MIN: Performed by: OBSTETRICS & GYNECOLOGY

## 2024-07-15 PROCEDURE — 2709999900 HC NON-CHARGEABLE SUPPLY: Performed by: OBSTETRICS & GYNECOLOGY

## 2024-07-15 PROCEDURE — 7100000010 HC PHASE II RECOVERY - FIRST 15 MIN: Performed by: OBSTETRICS & GYNECOLOGY

## 2024-07-15 PROCEDURE — 64488 TAP BLOCK BI INJECTION: CPT

## 2024-07-15 PROCEDURE — 58571 TLH W/T/O 250 G OR LESS: CPT | Performed by: OBSTETRICS & GYNECOLOGY

## 2024-07-15 PROCEDURE — 3700000000 HC ANESTHESIA ATTENDED CARE: Performed by: OBSTETRICS & GYNECOLOGY

## 2024-07-15 PROCEDURE — 81025 URINE PREGNANCY TEST: CPT

## 2024-07-15 RX ORDER — HYDROCODONE BITARTRATE AND ACETAMINOPHEN 5; 325 MG/1; MG/1
1 TABLET ORAL EVERY 6 HOURS PRN
Qty: 10 TABLET | Refills: 0 | Status: SHIPPED | OUTPATIENT
Start: 2024-07-15 | End: 2024-07-20

## 2024-07-15 RX ORDER — SODIUM CHLORIDE 0.9 % (FLUSH) 0.9 %
5-40 SYRINGE (ML) INJECTION PRN
Status: DISCONTINUED | OUTPATIENT
Start: 2024-07-15 | End: 2024-07-15 | Stop reason: HOSPADM

## 2024-07-15 RX ORDER — LIDOCAINE HYDROCHLORIDE 20 MG/ML
INJECTION, SOLUTION EPIDURAL; INFILTRATION; INTRACAUDAL; PERINEURAL PRN
Status: DISCONTINUED | OUTPATIENT
Start: 2024-07-15 | End: 2024-07-15 | Stop reason: SDUPTHER

## 2024-07-15 RX ORDER — SODIUM CHLORIDE, SODIUM LACTATE, POTASSIUM CHLORIDE, CALCIUM CHLORIDE 600; 310; 30; 20 MG/100ML; MG/100ML; MG/100ML; MG/100ML
INJECTION, SOLUTION INTRAVENOUS CONTINUOUS
Status: DISCONTINUED | OUTPATIENT
Start: 2024-07-15 | End: 2024-07-15 | Stop reason: HOSPADM

## 2024-07-15 RX ORDER — ROCURONIUM BROMIDE 10 MG/ML
INJECTION, SOLUTION INTRAVENOUS PRN
Status: DISCONTINUED | OUTPATIENT
Start: 2024-07-15 | End: 2024-07-15 | Stop reason: SDUPTHER

## 2024-07-15 RX ORDER — ONDANSETRON 2 MG/ML
INJECTION INTRAMUSCULAR; INTRAVENOUS PRN
Status: DISCONTINUED | OUTPATIENT
Start: 2024-07-15 | End: 2024-07-15 | Stop reason: SDUPTHER

## 2024-07-15 RX ORDER — DEXAMETHASONE SODIUM PHOSPHATE 4 MG/ML
INJECTION, SOLUTION INTRA-ARTICULAR; INTRALESIONAL; INTRAMUSCULAR; INTRAVENOUS; SOFT TISSUE PRN
Status: DISCONTINUED | OUTPATIENT
Start: 2024-07-15 | End: 2024-07-15 | Stop reason: SDUPTHER

## 2024-07-15 RX ORDER — NALOXONE HYDROCHLORIDE 0.4 MG/ML
INJECTION, SOLUTION INTRAMUSCULAR; INTRAVENOUS; SUBCUTANEOUS PRN
Status: DISCONTINUED | OUTPATIENT
Start: 2024-07-15 | End: 2024-07-15 | Stop reason: HOSPADM

## 2024-07-15 RX ORDER — LABETALOL HYDROCHLORIDE 5 MG/ML
10 INJECTION, SOLUTION INTRAVENOUS
Status: DISCONTINUED | OUTPATIENT
Start: 2024-07-15 | End: 2024-07-15 | Stop reason: HOSPADM

## 2024-07-15 RX ORDER — OXYCODONE HYDROCHLORIDE 5 MG/1
10 TABLET ORAL PRN
Status: COMPLETED | OUTPATIENT
Start: 2024-07-15 | End: 2024-07-15

## 2024-07-15 RX ORDER — OXYCODONE HYDROCHLORIDE 5 MG/1
5 TABLET ORAL PRN
Status: COMPLETED | OUTPATIENT
Start: 2024-07-15 | End: 2024-07-15

## 2024-07-15 RX ORDER — DEXAMETHASONE SODIUM PHOSPHATE 10 MG/ML
INJECTION, SOLUTION INTRAMUSCULAR; INTRAVENOUS PRN
Status: DISCONTINUED | OUTPATIENT
Start: 2024-07-15 | End: 2024-07-15 | Stop reason: SDUPTHER

## 2024-07-15 RX ORDER — SODIUM CHLORIDE 9 MG/ML
INJECTION, SOLUTION INTRAMUSCULAR; INTRAVENOUS; SUBCUTANEOUS PRN
Status: DISCONTINUED | OUTPATIENT
Start: 2024-07-15 | End: 2024-07-15 | Stop reason: SDUPTHER

## 2024-07-15 RX ORDER — SODIUM CHLORIDE 0.9 % (FLUSH) 0.9 %
5-40 SYRINGE (ML) INJECTION EVERY 12 HOURS SCHEDULED
Status: DISCONTINUED | OUTPATIENT
Start: 2024-07-15 | End: 2024-07-15 | Stop reason: HOSPADM

## 2024-07-15 RX ORDER — SODIUM CHLORIDE 9 MG/ML
INJECTION, SOLUTION INTRAVENOUS PRN
Status: DISCONTINUED | OUTPATIENT
Start: 2024-07-15 | End: 2024-07-15 | Stop reason: HOSPADM

## 2024-07-15 RX ORDER — ONDANSETRON 2 MG/ML
4 INJECTION INTRAMUSCULAR; INTRAVENOUS
Status: DISCONTINUED | OUTPATIENT
Start: 2024-07-15 | End: 2024-07-15 | Stop reason: HOSPADM

## 2024-07-15 RX ORDER — FENTANYL CITRATE 50 UG/ML
50 INJECTION, SOLUTION INTRAMUSCULAR; INTRAVENOUS EVERY 5 MIN PRN
Status: DISCONTINUED | OUTPATIENT
Start: 2024-07-15 | End: 2024-07-15 | Stop reason: HOSPADM

## 2024-07-15 RX ORDER — MIDAZOLAM HYDROCHLORIDE 1 MG/ML
INJECTION INTRAMUSCULAR; INTRAVENOUS PRN
Status: DISCONTINUED | OUTPATIENT
Start: 2024-07-15 | End: 2024-07-15 | Stop reason: SDUPTHER

## 2024-07-15 RX ORDER — ACETAMINOPHEN 325 MG/1
650 TABLET ORAL ONCE
Status: COMPLETED | OUTPATIENT
Start: 2024-07-15 | End: 2024-07-15

## 2024-07-15 RX ORDER — PROPOFOL 10 MG/ML
INJECTION, EMULSION INTRAVENOUS PRN
Status: DISCONTINUED | OUTPATIENT
Start: 2024-07-15 | End: 2024-07-15 | Stop reason: SDUPTHER

## 2024-07-15 RX ORDER — PROCHLORPERAZINE EDISYLATE 5 MG/ML
5 INJECTION INTRAMUSCULAR; INTRAVENOUS
Status: DISCONTINUED | OUTPATIENT
Start: 2024-07-15 | End: 2024-07-15 | Stop reason: HOSPADM

## 2024-07-15 RX ORDER — DIMENHYDRINATE 50 MG
50 TABLET ORAL ONCE
Status: COMPLETED | OUTPATIENT
Start: 2024-07-15 | End: 2024-07-15

## 2024-07-15 RX ORDER — GABAPENTIN 300 MG/1
300 CAPSULE ORAL ONCE
Status: COMPLETED | OUTPATIENT
Start: 2024-07-15 | End: 2024-07-15

## 2024-07-15 RX ORDER — ROPIVACAINE HYDROCHLORIDE 5 MG/ML
INJECTION, SOLUTION EPIDURAL; INFILTRATION; PERINEURAL PRN
Status: DISCONTINUED | OUTPATIENT
Start: 2024-07-15 | End: 2024-07-15 | Stop reason: SDUPTHER

## 2024-07-15 RX ORDER — FENTANYL CITRATE 50 UG/ML
INJECTION, SOLUTION INTRAMUSCULAR; INTRAVENOUS PRN
Status: DISCONTINUED | OUTPATIENT
Start: 2024-07-15 | End: 2024-07-15 | Stop reason: SDUPTHER

## 2024-07-15 RX ORDER — HYDRALAZINE HYDROCHLORIDE 20 MG/ML
10 INJECTION INTRAMUSCULAR; INTRAVENOUS
Status: DISCONTINUED | OUTPATIENT
Start: 2024-07-15 | End: 2024-07-15 | Stop reason: HOSPADM

## 2024-07-15 RX ORDER — KETOROLAC TROMETHAMINE 30 MG/ML
INJECTION, SOLUTION INTRAMUSCULAR; INTRAVENOUS PRN
Status: DISCONTINUED | OUTPATIENT
Start: 2024-07-15 | End: 2024-07-15 | Stop reason: SDUPTHER

## 2024-07-15 RX ORDER — KETOROLAC TROMETHAMINE 10 MG/1
10 TABLET, FILM COATED ORAL EVERY 6 HOURS PRN
Qty: 20 TABLET | Refills: 0 | Status: SHIPPED | OUTPATIENT
Start: 2024-07-15 | End: 2025-07-15

## 2024-07-15 RX ORDER — ENOXAPARIN SODIUM 100 MG/ML
40 INJECTION SUBCUTANEOUS ONCE
Status: COMPLETED | OUTPATIENT
Start: 2024-07-15 | End: 2024-07-15

## 2024-07-15 RX ORDER — CEFAZOLIN SODIUM IN 0.9 % NACL 2 G/100 ML
2000 PLASTIC BAG, INJECTION (ML) INTRAVENOUS
Status: COMPLETED | OUTPATIENT
Start: 2024-07-15 | End: 2024-07-15

## 2024-07-15 RX ADMIN — PHENYLEPHRINE HYDROCHLORIDE 100 MCG: 10 INJECTION INTRAVENOUS at 13:04

## 2024-07-15 RX ADMIN — DIMENHYDRINATE 50 MG: 50 TABLET ORAL at 10:18

## 2024-07-15 RX ADMIN — FENTANYL CITRATE 50 MCG: 50 INJECTION, SOLUTION INTRAMUSCULAR; INTRAVENOUS at 14:47

## 2024-07-15 RX ADMIN — PROPOFOL 200 MG: 10 INJECTION, EMULSION INTRAVENOUS at 12:47

## 2024-07-15 RX ADMIN — ROPIVACAINE HYDROCHLORIDE 50 ML: 5 INJECTION EPIDURAL; INFILTRATION; PERINEURAL at 12:35

## 2024-07-15 RX ADMIN — ONDANSETRON 4 MG: 2 INJECTION INTRAMUSCULAR; INTRAVENOUS at 14:20

## 2024-07-15 RX ADMIN — DEXAMETHASONE SODIUM PHOSPHATE 10 MG: 10 INJECTION, SOLUTION INTRAMUSCULAR; INTRAVENOUS at 12:35

## 2024-07-15 RX ADMIN — ROCURONIUM BROMIDE 50 MG: 10 INJECTION, SOLUTION INTRAVENOUS at 12:47

## 2024-07-15 RX ADMIN — SODIUM CHLORIDE, POTASSIUM CHLORIDE, SODIUM LACTATE AND CALCIUM CHLORIDE: 600; 310; 30; 20 INJECTION, SOLUTION INTRAVENOUS at 12:44

## 2024-07-15 RX ADMIN — ACETAMINOPHEN 650 MG: 325 TABLET ORAL at 10:18

## 2024-07-15 RX ADMIN — ONDANSETRON 4 MG: 2 INJECTION INTRAMUSCULAR; INTRAVENOUS at 12:47

## 2024-07-15 RX ADMIN — SUGAMMADEX 100 MG: 100 INJECTION, SOLUTION INTRAVENOUS at 14:24

## 2024-07-15 RX ADMIN — SUGAMMADEX 100 MG: 100 INJECTION, SOLUTION INTRAVENOUS at 14:20

## 2024-07-15 RX ADMIN — Medication 2000 MG: at 12:40

## 2024-07-15 RX ADMIN — DEXAMETHASONE SODIUM PHOSPHATE 4 MG: 4 INJECTION, SOLUTION INTRA-ARTICULAR; INTRALESIONAL; INTRAMUSCULAR; INTRAVENOUS; SOFT TISSUE at 12:52

## 2024-07-15 RX ADMIN — KETOROLAC TROMETHAMINE 30 MG: 30 INJECTION, SOLUTION INTRAMUSCULAR at 14:20

## 2024-07-15 RX ADMIN — ROCURONIUM BROMIDE 20 MG: 10 INJECTION, SOLUTION INTRAVENOUS at 13:44

## 2024-07-15 RX ADMIN — OXYCODONE HYDROCHLORIDE 10 MG: 5 TABLET ORAL at 15:26

## 2024-07-15 RX ADMIN — LIDOCAINE HYDROCHLORIDE 5 ML: 20 INJECTION, SOLUTION EPIDURAL; INFILTRATION; INTRACAUDAL; PERINEURAL at 12:47

## 2024-07-15 RX ADMIN — GABAPENTIN 300 MG: 300 CAPSULE ORAL at 10:18

## 2024-07-15 RX ADMIN — FENTANYL CITRATE 50 MCG: 50 INJECTION INTRAMUSCULAR; INTRAVENOUS at 12:29

## 2024-07-15 RX ADMIN — PHENYLEPHRINE HYDROCHLORIDE 100 MCG: 10 INJECTION INTRAVENOUS at 13:09

## 2024-07-15 RX ADMIN — SODIUM CHLORIDE, POTASSIUM CHLORIDE, SODIUM LACTATE AND CALCIUM CHLORIDE: 600; 310; 30; 20 INJECTION, SOLUTION INTRAVENOUS at 14:01

## 2024-07-15 RX ADMIN — SODIUM CHLORIDE 30 ML: 9 INJECTION INTRAMUSCULAR; INTRAVENOUS; SUBCUTANEOUS at 12:35

## 2024-07-15 RX ADMIN — ENOXAPARIN SODIUM 40 MG: 100 INJECTION SUBCUTANEOUS at 12:40

## 2024-07-15 RX ADMIN — MIDAZOLAM 2 MG: 1 INJECTION INTRAMUSCULAR; INTRAVENOUS at 12:29

## 2024-07-15 RX ADMIN — SODIUM CHLORIDE, POTASSIUM CHLORIDE, SODIUM LACTATE AND CALCIUM CHLORIDE: 600; 310; 30; 20 INJECTION, SOLUTION INTRAVENOUS at 10:18

## 2024-07-15 RX ADMIN — FENTANYL CITRATE 50 MCG: 50 INJECTION INTRAMUSCULAR; INTRAVENOUS at 12:47

## 2024-07-15 ASSESSMENT — PAIN DESCRIPTION - ONSET
ONSET: ON-GOING

## 2024-07-15 ASSESSMENT — PAIN SCALES - GENERAL
PAINLEVEL_OUTOF10: 7
PAINLEVEL_OUTOF10: 5
PAINLEVEL_OUTOF10: 8
PAINLEVEL_OUTOF10: 8

## 2024-07-15 ASSESSMENT — PAIN DESCRIPTION - FREQUENCY
FREQUENCY: CONTINUOUS
FREQUENCY: INTERMITTENT
FREQUENCY: CONTINUOUS

## 2024-07-15 ASSESSMENT — PAIN DESCRIPTION - PAIN TYPE
TYPE: SURGICAL PAIN
TYPE: ACUTE PAIN;SURGICAL PAIN
TYPE: SURGICAL PAIN

## 2024-07-15 ASSESSMENT — PAIN DESCRIPTION - DESCRIPTORS
DESCRIPTORS: CRAMPING

## 2024-07-15 ASSESSMENT — PAIN DESCRIPTION - ORIENTATION
ORIENTATION: LOWER

## 2024-07-15 ASSESSMENT — PAIN DESCRIPTION - LOCATION
LOCATION: ABDOMEN

## 2024-07-15 ASSESSMENT — PAIN - FUNCTIONAL ASSESSMENT
PAIN_FUNCTIONAL_ASSESSMENT: ACTIVITIES ARE NOT PREVENTED
PAIN_FUNCTIONAL_ASSESSMENT: NONE - DENIES PAIN
PAIN_FUNCTIONAL_ASSESSMENT: ACTIVITIES ARE NOT PREVENTED
PAIN_FUNCTIONAL_ASSESSMENT: ACTIVITIES ARE NOT PREVENTED

## 2024-07-15 NOTE — PROGRESS NOTES
Discharge instructions reviewed with pt and  Spike. All questions answered. Pt verbalizes readiness to go home.    Discharge Criteria    Inpatients must meet Criteria 1 through 7. All other patients are either YES or N/A. If a NO is chosen then Anesthesia or Surgeon must be notified.      1.  Minimum 30 minutes after last dose of sedative medication.    Yes      2.  Systolic BP between 90 - 160. Diastolic BP between 60 - 90.    Yes      3.  Pulse between 60 - 120    Yes      4.  Respirations between 8 - 25.    Yes      5.  SpO2 92% - 100%.    Yes      6.  Able to cough and swallow or return to baseline function.    Yes      7.  Alert and oriented or return to baseline mental status.    Yes      8.  Demonstrates controlled, coordinated movements, ambulates with steady gait, or return to baseline activity function.    Yes      9.  Minimal or no pain or nausea, or at a level tolerable and acceptable to patient.    Yes      10. Takes and retains oral fluids as allowed.    Yes      11. Procedural / perioperative site stable.  Minimal or no bleeding.    Yes          12. If GI endoscopy procedure, minimal or no abdominal distention or passing flatus.    N/A      13. Written discharge instructions and emergency telephone number provided.    Yes      14. Accompanied by a responsible adult.    Yes

## 2024-07-15 NOTE — OP NOTE
Preoperative diagnosis: 1.  Chronic pelvic pain  2.  Irregular menstruation  3.  Bicornuate uterus    Postoperative diagnosis: Same    Procedure:  Robotic-assisted Total Laparoscopic Hysterectomy with Bilateral Salpingectomy    Surgeon: Dr. Krystyna Jackson    Asst.: Jovana Kam PGY3    Anesthesia: Gen.    Estimated blood loss: 25 mL    Fluids: LR    Urine: 700 mL of clear urine    Condition: Stable    Complications: None    Findings: The patient had an anteverted uterus which sounded to 8 cm in depth.  The uterus was adenomyotic in appearance and bicornuate. The tubes and ovaries were grossly within normal limits.  Bilateral ureteral peristalsis was noted throughout the case and after closure of the vaginal cuff.    Specimen removed: Uterus and Bilateral tubes    Operative note: The patient was taken to the operating room where general anesthesia was obtained without difficulty.  She was prepped and draped usual sterile fashion in a dorsal lithotomy position.  Timeout was performed.  A weighted speculum was placed in the patient's vagina and the anterior lip of the cervix was grasped with a single-tooth tenaculum.  The cervix was progressively dilated and the uterus was sounded with the findings noted above.  A V care uterine manipulator was then placed.  A Cohen catheter was placed and left to gravity drainage.  At this point attention was turned to the patient's abdomen where a supraumbilical incision was made with a scalpel.  An 8 mm skin incision was made.  A veress needle was then carefully introduced at a 45° angle while tenting the abdominal wall.  Intraabdominal placement was confirmed by use of water-filled syringe and drop in intra-abdominal pressure with insufflation of CO2.  Pneumoperitoneum was obtained with 2.5 liters of CO2.  An 8 mm robotic port was then placed without difficulty and intra-abdominal placement was confirmed by laparoscopy.  Second and third ports were

## 2024-07-15 NOTE — ANESTHESIA PROCEDURE NOTES
Peripheral Block    Patient location during procedure: pre-op  Reason for block: post-op pain management and at surgeon's request  Start time: 7/15/2024 12:28 PM  End time: 7/15/2024 1:38 PM  Staffing  Performed: resident/CRNA   Resident/CRNA: Alfa Olivas APRN - CRNA  Performed by: Alfa Olivas APRN - CRNA  Authorized by: Lilly Sheridan APRN - CRNA    Preanesthetic Checklist  Completed: patient identified, IV checked, site marked, risks and benefits discussed, surgical/procedural consents, equipment checked, pre-op evaluation, timeout performed, anesthesia consent given, oxygen available, monitors applied/VS acknowledged and fire risk safety assessment completed and verbalized  Peripheral Block   Patient position: supine  Prep: ChloraPrep  Provider prep: sterile gloves and mask  Patient monitoring: cardiac monitor, continuous pulse ox, IV access and frequent blood pressure checks  Block type: TAP and Rectus sheath  Laterality: bilateral  Injection technique: single-shot  Guidance: ultrasound guided  Local infiltration: decadron and ropivacaine  Infiltration strength: 0.5 %  Local infiltration: decadron and ropivacaine  Dose: 50 mL    Needle   Needle type: Other   Needle gauge: 22 G  Needle localization: ultrasound guidance  Needle length: 11 cmOther needle type: Pajunk  Assessment   Injection assessment: negative aspiration for heme, no paresthesia on injection, local visualized surrounding nerve on ultrasound and no intravascular symptoms  Paresthesia pain: none  Slow fractionated injection: yes  Hemodynamics: stable  Outcomes: uncomplicated and patient tolerated procedure well    Additional Notes  0.5% Ropivacaine 50ml, PFNS 30ml, PF Decadron 10mg      BERNADETTE Goyal placed block under direct supervision of SHAHZAD Olivas

## 2024-07-15 NOTE — ANESTHESIA POSTPROCEDURE EVALUATION
Department of Anesthesiology  Postprocedure Note    Patient: Dulce Lagunas  MRN: [id number]  YOB: 1985  Date of evaluation: 7/15/2024    Procedure Summary       Date: 07/15/24 Room / Location: [address]    Anesthesia Start: 1244 Anesthesia Stop: 1432    Procedure: HYSTERECTOMY VAGINAL LAPAROSCOPIC ROBOTIC ASSISTED-POSSIBLE BILATERAL SALPINGOOPHORECTOMY, POSSIBLE LAPAROSCOPIC COLPOPEXY Diagnosis:       Irregular menses      Pelvic pain      (Irregular menses [N92.6])      (Pelvic pain [R10.2])    Surgeons: Krystyna Chi DO Responsible Provider: Lilly Sheridan APRN - CRNA    Anesthesia Type: general ASA Status: 2            Anesthesia Type: No value filed.    Hollis Phase I: Hollis Score: 10    Hollis Phase II: Hollis Score: 10    Anesthesia Post Evaluation    Patient location during evaluation: PACU  Patient participation: complete - patient participated  Level of consciousness: awake  Pain score: 5  Airway patency: patent  Nausea & Vomiting: no vomiting and no nausea  Cardiovascular status: blood pressure returned to baseline and hemodynamically stable  Respiratory status: acceptable, spontaneous ventilation and room air  Hydration status: stable  Multimodal analgesia pain management approach  Pain management: satisfactory to patient        No notable events documented.

## 2024-07-15 NOTE — ANESTHESIA PRE PROCEDURE
131/73   Pulse:   84   Resp:   14   Temp:   36.4 °C (97.6 °F)   TempSrc:   Temporal   SpO2:   95%   Weight: 91.2 kg (201 lb) 91.2 kg (201 lb)    Height: 1.626 m (5' 4\") 1.626 m (5' 4\")                                               BP Readings from Last 3 Encounters:   07/15/24 131/73   07/02/24 122/82   02/06/24 122/74       NPO Status: Time of last liquid consumption: 2345      Patient had Ice cream at 1530         Patient had water at 1700               Time of last solid consumption: 2345                        Date of last liquid consumption: 07/14/24                        Date of last solid food consumption: 07/14/24    BMI:   Wt Readings from Last 3 Encounters:   07/15/24 91.2 kg (201 lb)   07/02/24 91.2 kg (201 lb)   02/06/24 91.2 kg (201 lb)     Body mass index is 34.5 kg/m².    CBC:   Lab Results   Component Value Date/Time    WBC 7.6 05/10/2024 08:15 AM    RBC 4.38 05/10/2024 08:15 AM    HGB 13.0 05/10/2024 08:15 AM    HCT 41.5 05/10/2024 08:15 AM    MCV 94.7 05/10/2024 08:15 AM    RDW 13.2 05/10/2024 08:15 AM     05/10/2024 08:15 AM       CMP:   Lab Results   Component Value Date/Time     05/10/2024 08:15 AM    K 3.9 05/10/2024 08:15 AM     05/10/2024 08:15 AM    CO2 25 05/10/2024 08:15 AM    BUN 13 05/10/2024 08:15 AM    CREATININE 0.9 05/10/2024 08:15 AM    GFRAA >60 04/13/2021 06:00 AM    LABGLOM 86 05/10/2024 08:15 AM    LABGLOM >60 11/28/2022 08:37 AM    GLUCOSE 131 05/10/2024 08:15 AM    CALCIUM 9.1 05/10/2024 08:15 AM    BILITOT 0.4 05/10/2024 08:15 AM    ALKPHOS 61 05/10/2024 08:15 AM    AST 22 05/10/2024 08:15 AM    ALT 13 05/10/2024 08:15 AM       POC Tests: No results for input(s): \"POCGLU\", \"POCNA\", \"POCK\", \"POCCL\", \"POCBUN\", \"POCHEMO\", \"POCHCT\" in the last 72 hours.    Coags: No results found for: \"PROTIME\", \"INR\", \"APTT\"    HCG (If Applicable):   Lab Results   Component Value Date    PREGTESTUR NEGATIVE 07/15/2024    HCGQUANT <1 04/27/2022        ABGs: No results found

## 2024-07-17 LAB — SURGICAL PATHOLOGY REPORT: NORMAL

## 2024-07-24 ENCOUNTER — OFFICE VISIT (OUTPATIENT)
Dept: OBGYN | Age: 39
End: 2024-07-24

## 2024-07-24 VITALS
DIASTOLIC BLOOD PRESSURE: 84 MMHG | WEIGHT: 202 LBS | SYSTOLIC BLOOD PRESSURE: 122 MMHG | BODY MASS INDEX: 34.49 KG/M2 | HEIGHT: 64 IN

## 2024-07-24 DIAGNOSIS — Z09 POSTOPERATIVE EXAMINATION: Primary | ICD-10-CM

## 2024-07-24 DIAGNOSIS — Z30.46 ENCOUNTER FOR REMOVAL OF SUBDERMAL CONTRACEPTIVE IMPLANT: ICD-10-CM

## 2024-07-24 RX ORDER — LIDOCAINE HYDROCHLORIDE 10 MG/ML
2 INJECTION, SOLUTION EPIDURAL; INFILTRATION; INTRACAUDAL; PERINEURAL ONCE
Status: CANCELLED | OUTPATIENT
Start: 2024-07-24 | End: 2024-07-24

## 2024-07-24 ASSESSMENT — PATIENT HEALTH QUESTIONNAIRE - PHQ9
SUM OF ALL RESPONSES TO PHQ QUESTIONS 1-9: 1
SUM OF ALL RESPONSES TO PHQ QUESTIONS 1-9: 1
SUM OF ALL RESPONSES TO PHQ9 QUESTIONS 1 & 2: 1
SUM OF ALL RESPONSES TO PHQ QUESTIONS 1-9: 1
1. LITTLE INTEREST OR PLEASURE IN DOING THINGS: SEVERAL DAYS
2. FEELING DOWN, DEPRESSED OR HOPELESS: NOT AT ALL
SUM OF ALL RESPONSES TO PHQ QUESTIONS 1-9: 1

## 2024-07-31 ENCOUNTER — HOSPITAL ENCOUNTER (OUTPATIENT)
Age: 39
Setting detail: SPECIMEN
Discharge: HOME | End: 2024-07-31
Payer: COMMERCIAL

## 2024-07-31 ENCOUNTER — OFFICE VISIT (OUTPATIENT)
Dept: OBGYN | Age: 39
End: 2024-07-31

## 2024-07-31 VITALS
BODY MASS INDEX: 34.49 KG/M2 | DIASTOLIC BLOOD PRESSURE: 72 MMHG | HEIGHT: 64 IN | SYSTOLIC BLOOD PRESSURE: 118 MMHG | WEIGHT: 202 LBS

## 2024-07-31 DIAGNOSIS — R10.2 VAGINAL PAIN: Primary | ICD-10-CM

## 2024-07-31 DIAGNOSIS — R10.2 VAGINAL PAIN: ICD-10-CM

## 2024-07-31 DIAGNOSIS — Z09 POSTOPERATIVE EXAMINATION: ICD-10-CM

## 2024-07-31 LAB
CANDIDA SPECIES: NEGATIVE
GARDNERELLA VAGINALIS: POSITIVE
SOURCE: ABNORMAL
TRICHOMONAS: NEGATIVE

## 2024-07-31 PROCEDURE — 87660 TRICHOMONAS VAGIN DIR PROBE: CPT

## 2024-07-31 PROCEDURE — 87510 GARDNER VAG DNA DIR PROBE: CPT

## 2024-07-31 PROCEDURE — 87480 CANDIDA DNA DIR PROBE: CPT

## 2024-07-31 PROCEDURE — 99024 POSTOP FOLLOW-UP VISIT: CPT

## 2024-08-01 ENCOUNTER — TELEPHONE (OUTPATIENT)
Dept: OBGYN | Age: 39
End: 2024-08-01

## 2024-08-01 RX ORDER — METRONIDAZOLE 500 MG/1
500 TABLET ORAL 2 TIMES DAILY
Qty: 14 TABLET | Refills: 0 | Status: SHIPPED | OUTPATIENT
Start: 2024-08-01 | End: 2024-08-08

## 2024-08-01 NOTE — TELEPHONE ENCOUNTER
Pt called in as she seen my chart culture results showed positive. Can you review today when your able please.

## 2024-08-02 NOTE — PROGRESS NOTES
Postop Progress Note    Subjective    Dulce Lagunas presents to the office for postop follow up.  Chief Complaint   Patient presents with    Post-Op Check      Robotic-assisted Total Laparoscopic Hysterectomy with Bilateral Salpingectomy was performed on 7/15/24   Pt states she is having vaginal pain since her surgery. Pt states she would like eduardo to look at her incision site because she tried to take the band off and felt like she was also taking the steri strips of.        Objective    Vitals:    07/24/24 0945   BP: 122/84     General: alert, cooperative and no distress  Incision: healing well    Assessment  Doing well postoperatively.  Reports vaginal pain near lac repair.  Denies bleeding, discharge.  Steri-strips removed in clinic today.  Abdominal incisions healing well.  Aware of benign pathology.  Doing well with restrictions.  Patient also requests to have Nexplanon removed at this time.     PROCEDURE:  The patient was positioned comfortably on our procedure table. She was consented earlier in the appointment and the procedure risk and complications were reviewed. Procedure being completed on left upper arm.  A sterile prep with betadine x1 swabs was completed and 1ml of lidocaine with epinephrine for local anesthetic was utilized. The skin had a small incision just beyond the proximal tip of the insert and utilizing blunt dissection the stylet was grasped and removed. Device was intact and visualized by patient and I.  A sterile dressing and steri-strip was applied. The patient tolerated the procedure well.  Formal restrictions were discussed in detail. She is to notify our office if any swelling, redness, temperature, or limb restriction or numbness. Showers are allowed in 24 hours. She may take Tylenol for any pain.      Plan  1. Continue any current medications  2. Wound care discussed  3. Pt is to continue with activity restrictions  4. Usual diet  5. Follow up: 4 weeks    Electronically signed by

## 2024-08-15 RX ORDER — FLUCONAZOLE 150 MG/1
150 TABLET ORAL
Qty: 2 TABLET | Refills: 0 | Status: SHIPPED | OUTPATIENT
Start: 2024-08-15 | End: 2024-08-19

## 2024-08-20 ENCOUNTER — OFFICE VISIT (OUTPATIENT)
Dept: OBGYN | Age: 39
End: 2024-08-20

## 2024-08-20 VITALS
DIASTOLIC BLOOD PRESSURE: 84 MMHG | BODY MASS INDEX: 34.49 KG/M2 | SYSTOLIC BLOOD PRESSURE: 126 MMHG | WEIGHT: 202 LBS | HEIGHT: 64 IN

## 2024-08-20 DIAGNOSIS — Z09 POSTOPERATIVE EXAMINATION: Primary | ICD-10-CM

## 2024-08-20 PROCEDURE — 99024 POSTOP FOLLOW-UP VISIT: CPT | Performed by: OBSTETRICS & GYNECOLOGY

## 2024-08-20 RX ORDER — FLUCONAZOLE 150 MG/1
TABLET ORAL
COMMUNITY
Start: 2024-08-15

## 2024-08-20 NOTE — PROGRESS NOTES
Postop Progress Note    Subjective    Dulce Lagunas presents to the office for postop follow up.    Chief Complaint   Patient presents with    Post-Op Check     Pt had a HYSTERECTOMY VAGINAL LAPAROSCOPIC ROBOTIC ASSISTED-POSSIBLE BILATERAL SALPINGOOPHORECTOMY, POSSIBLE LAPAROSCOPIC COLPOPEXY on 7/15/24. Pt states her previous yeast infection is clearing up.          Objective    Vitals:    08/20/24 1552   BP: 126/84     General: alert, cooperative and no distress  Incision: healing well, cuff well supported and intact    Assessment  Doing well postoperatively.    Plan  1. Continue any current medications  2. Wound care discussed  3. Pt is to increase activities as tolerated after 8 weeks  4. Usual diet  5. Follow up: as needed/annual    Electronically signed by Krystyna Jackson DO on 8/20/2024 at 4:03 PM

## 2025-01-07 ENCOUNTER — OFFICE VISIT (OUTPATIENT)
Dept: OBGYN | Age: 40
End: 2025-01-07
Payer: COMMERCIAL

## 2025-01-07 VITALS
HEIGHT: 64 IN | SYSTOLIC BLOOD PRESSURE: 122 MMHG | DIASTOLIC BLOOD PRESSURE: 74 MMHG | BODY MASS INDEX: 35.61 KG/M2 | WEIGHT: 208.6 LBS

## 2025-01-07 DIAGNOSIS — Z01.419 ENCOUNTER FOR ANNUAL ROUTINE GYNECOLOGICAL EXAMINATION: Primary | ICD-10-CM

## 2025-01-07 DIAGNOSIS — Z12.31 SCREENING MAMMOGRAM, ENCOUNTER FOR: ICD-10-CM

## 2025-01-07 DIAGNOSIS — N62 MACROMASTIA: ICD-10-CM

## 2025-01-07 DIAGNOSIS — N64.4 BILATERAL MASTODYNIA: ICD-10-CM

## 2025-01-07 PROCEDURE — 99396 PREV VISIT EST AGE 40-64: CPT | Performed by: ADVANCED PRACTICE MIDWIFE

## 2025-01-07 RX ORDER — PRAVASTATIN SODIUM 20 MG
20 TABLET ORAL
COMMUNITY
Start: 2024-11-07

## 2025-01-07 RX ORDER — OMEPRAZOLE 40 MG/1
40 CAPSULE, DELAYED RELEASE ORAL DAILY
COMMUNITY
Start: 2024-12-12

## 2025-01-07 RX ORDER — L.ACID/L.CASEI/B.BIF/B.LON/FOS 2B CELL-50
CAPSULE ORAL DAILY
COMMUNITY

## 2025-01-07 RX ORDER — SEMAGLUTIDE 0.68 MG/ML
INJECTION, SOLUTION SUBCUTANEOUS
COMMUNITY
Start: 2024-12-27

## 2025-01-07 SDOH — ECONOMIC STABILITY: FOOD INSECURITY: WITHIN THE PAST 12 MONTHS, THE FOOD YOU BOUGHT JUST DIDN'T LAST AND YOU DIDN'T HAVE MONEY TO GET MORE.: NEVER TRUE

## 2025-01-07 SDOH — ECONOMIC STABILITY: INCOME INSECURITY: HOW HARD IS IT FOR YOU TO PAY FOR THE VERY BASICS LIKE FOOD, HOUSING, MEDICAL CARE, AND HEATING?: NOT HARD AT ALL

## 2025-01-07 SDOH — ECONOMIC STABILITY: FOOD INSECURITY: WITHIN THE PAST 12 MONTHS, YOU WORRIED THAT YOUR FOOD WOULD RUN OUT BEFORE YOU GOT MONEY TO BUY MORE.: NEVER TRUE

## 2025-01-07 ASSESSMENT — ENCOUNTER SYMPTOMS
SHORTNESS OF BREATH: 0
BACK PAIN: 1
ABDOMINAL PAIN: 0

## 2025-01-07 ASSESSMENT — PATIENT HEALTH QUESTIONNAIRE - PHQ9
SUM OF ALL RESPONSES TO PHQ QUESTIONS 1-9: 0
SUM OF ALL RESPONSES TO PHQ9 QUESTIONS 1 & 2: 0
SUM OF ALL RESPONSES TO PHQ QUESTIONS 1-9: 0
2. FEELING DOWN, DEPRESSED OR HOPELESS: NOT AT ALL
1. LITTLE INTEREST OR PLEASURE IN DOING THINGS: NOT AT ALL

## 2025-01-07 NOTE — PROGRESS NOTES
YEARLY PHYSICAL    Date of service: 2025    Dulce Lagunas  Is a 39 y.o.  , female    PT's PCP is: Kath Manzano APRN - CNP     : 1985                                             Subjective:       Patient's last menstrual period was 2024.     Are your menses regular: not applicable    OB History    Para Term  AB Living   5 2 1 1 2 3   SAB IAB Ectopic Molar Multiple Live Births   2       1 2      # Outcome Date GA Lbr Luis/2nd Weight Sex Type Anes PTL Lv   5A  20 35w2d  2.324 kg (5 lb 2 oz) M CS-LTranv Spinal Y KHADIJAH   5B  20 35w2d  2.515 kg (5 lb 8.7 oz) F CS-LTranv Spinal Y KHADIJAH   4  2020           3 Term 10/29/13 39w2d  3.435 kg (7 lb 9.2 oz) M CS-Unspec      2 2012 6w0d          1 2011 6w0d               Social History     Tobacco Use   Smoking Status Never   Smokeless Tobacco Never        Social History     Substance and Sexual Activity   Alcohol Use Yes    Alcohol/week: 1.0 standard drink of alcohol    Types: 1 Glasses of wine per week    Comment: social       Family History   Problem Relation Age of Onset    Ulcerative Colitis Mother     Diabetes Father     Other Father         prostate issues    High Blood Pressure Father     Deep Vein Thrombosis Father     Diabetes Paternal Grandmother        Any family history of breast or ovarian cancer: No    Any family history of blood clots: Yes    Allergies: Adhesive tape and Sulfa antibiotics      Current Outpatient Medications:     pravastatin (PRAVACHOL) 20 MG tablet, Take 1 tablet by mouth nightly, Disp: , Rfl:     omeprazole (PRILOSEC) 40 MG delayed release capsule, Take 1 capsule by mouth daily, Disp: , Rfl:     OZEMPIC, 0.25 OR 0.5 MG/DOSE, 2 MG/3ML SOPN, INJECT 0.5 mg UNDER THE SKIN EVERY monday, Disp: , Rfl:     probiotic (ALIGN/RISAQUAD) CAPS capsule, Take by mouth daily, Disp: , Rfl:     Loratadine (CLARITIN PO),

## (undated) DEVICE — CHLORAPREP 26ML ORANGE

## (undated) DEVICE — CATHETER URETH 10FR L16IN SIL INTMIT TIEM TAPR COUDE TIP

## (undated) DEVICE — GOWN,AURORA,NON-REINFORCED,2XL: Brand: MEDLINE

## (undated) DEVICE — PACK PROCEDURE SURG C SECT CUST STRL

## (undated) DEVICE — COVER LT HNDL BLU PLAS

## (undated) DEVICE — SEAL

## (undated) DEVICE — CLAMP,UMBILCIAL CORD,PLASTIC,WHITE: Brand: MEDLINE INDUSTRIES, INC.

## (undated) DEVICE — PAD PT POS 36 IN SURGYPAD DISP

## (undated) DEVICE — SYRINGE,EAR/ULCER, 2 OZ, STERILE: Brand: MEDLINE

## (undated) DEVICE — PACK,LITHOTOMY: Brand: MEDLINE

## (undated) DEVICE — 3M™ STERI-STRIP™ REINFORCED ADHESIVE SKIN CLOSURES, R1547, 1/2 IN X 4 IN (12 MM X 100 MM), 6 STRIPS/ENVELOPE: Brand: 3M™ STERI-STRIP™

## (undated) DEVICE — SUTURE VCRL SZ 3-0 L36IN ABSRB UD L36MM CT-1 1/2 CIR J944H

## (undated) DEVICE — PREP PAD BNS: Brand: CONVERTORS

## (undated) DEVICE — VCARE MEDIUM, UTERINE MANIPULATOR, VAGINAL-CERVICAL-AHLUWALIA'S-RETRACTOR-ELEVATOR: Brand: VCARE

## (undated) DEVICE — BLADELESS OBTURATOR: Brand: WECK VISTA

## (undated) DEVICE — SOLUTION IRRIG 1000ML 0.9% SOD CHL USP POUR PLAS BTL

## (undated) DEVICE — Z DISCONTINUED BY MEDLINE USE 2711682 TRAY SKIN PREP DRY W/ PREM GLV

## (undated) DEVICE — SEALER LAP SM L18.8CM OPN JAW HAND/FOOT SWCH FORCETRIAD

## (undated) DEVICE — PAD N ADH W3XL4IN POLY COT SFT PERF FLM EASILY CUT ABSRB

## (undated) DEVICE — SOLUTION IV IRRIG POUR BRL 0.9% SODIUM CHL 2F7124

## (undated) DEVICE — ENDOSCOPIC KIT CLN SWAB MICROFIBER CLTH SCP CLEANOR DISP

## (undated) DEVICE — Z DISCONTINUED USE 2257856 SUTURE VICRYL 3-0 L36IN ABSRB UD CT L40MM 1/2 CIR TAPERPOINT J956H

## (undated) DEVICE — ARM DRAPE

## (undated) DEVICE — SOLUTION SURG PREP ANTIMICROBIAL 4 OZ SKIN WND EXIDINE

## (undated) DEVICE — PICO 7 10CM X 30CM: Brand: PICO™ 7

## (undated) DEVICE — CURETTE VAC SZ 08MM CLR PLAS MTL HNDL CVD RIG

## (undated) DEVICE — GAMMEX® NON-LATEX PI ORTHO SIZE 7, STERILE POLYISOPRENE POWDER-FREE SURGICAL GLOVE: Brand: GAMMEX

## (undated) DEVICE — Device

## (undated) DEVICE — ELECTRO LUBE IS A SINGLE PATIENT USE DEVICE THAT IS INTENDED TO BE USED ON ELECTROSURGICAL ELECTRODES TO REDUCE STICKING.: Brand: KEY SURGICAL ELECTRO LUBE

## (undated) DEVICE — GOWN,AURORA,NONREINFORCED,LARGE: Brand: MEDLINE

## (undated) DEVICE — TIP COVER ACCESSORY

## (undated) DEVICE — GLOVE SURG SZ 65 THK91MIL LTX FREE SYN POLYISOPRENE

## (undated) DEVICE — SPONGE ABSORBABLE TOP 100 GEL GELFOAM LF

## (undated) DEVICE — SUTURE VCRL + SZ 3-0 L27IN ABSRB UD L26MM SH 1/2 CIR VCP416H

## (undated) DEVICE — STAPLER EXT SKIN 35 WIDE S STL STPL SQUEEZE HNDL VISISTAT

## (undated) DEVICE — GOWN,SIRUS,POLYRNF,BRTHSLV,LG,30/CS: Brand: MEDLINE

## (undated) DEVICE — PEN: MARKING STD 100/CS: Brand: MEDICAL ACTION INDUSTRIES

## (undated) DEVICE — ELECTRODE ES AD CRD L15FT DISP FOR PT BELOW 30LB REM

## (undated) DEVICE — TRAY CATH 16FR F INCLUDE LUB DRNGE BG STATLOK STBL DEV

## (undated) DEVICE — SUTURE ABSORBABLE BRAIDED 0 CT 36 IN DA UD VICRYL VCP958H

## (undated) DEVICE — GAUZE,SPONGE,4"X4",16PLY,XRAY,STRL,LF: Brand: MEDLINE

## (undated) DEVICE — SUTURE MONOCRYL SZ 4-0 L27IN ABSRB UD L19MM PS-2 1/2 CIR PRIM Y426H

## (undated) DEVICE — CATHETER URETH 16FR L16IN RED RUB INTMIT ROB MOD BARDX

## (undated) DEVICE — NEEDLE HYPO 22GA L1.5IN BLK S STL HUB POLYPR SHLD REG BVL

## (undated) DEVICE — PAD,ABDOMINAL,8"X10",ST,LF: Brand: MEDLINE

## (undated) DEVICE — INTENDED FOR TISSUE SEPARATION, AND OTHER PROCEDURES THAT REQUIRE A SHARP SURGICAL BLADE TO PUNCTURE OR CUT.: Brand: BARD-PARKER ® CARBON RIB-BACK BLADES

## (undated) DEVICE — LABEL SYS CORR MED

## (undated) DEVICE — SUTURE MCRYL + SZ 4 0 L18IN ABSRB UD PC 3 L16MM 3 8 CIR PRIM MCP845G

## (undated) DEVICE — UNDERPAD INCONT 2XL FOR 38-58IN WAIST MESH PROTCT + DISP

## (undated) DEVICE — C-ARM: Brand: UNBRANDED

## (undated) DEVICE — SOLUTION PREP POVIDONE IOD FOR SKIN MUCOUS MEM PRIOR TO

## (undated) DEVICE — MTHZ GYN LAPAROSCOPY: Brand: MEDLINE INDUSTRIES, INC.

## (undated) DEVICE — GLOVE SURG SZ 65 CRM LTX FREE POLYISOPRENE POLYMER BEAD ANTI

## (undated) DEVICE — INSUFFLATION NEEDLE TO ESTABLISH PNEUMOPERITONEUM.: Brand: INSUFFLATION NEEDLE

## (undated) DEVICE — SPONGE COUNTING BAG: Brand: DEVON

## (undated) DEVICE — SOLUTION IV 1000ML 0.9% SOD CHL FOR IRRIG PLAS CONT

## (undated) DEVICE — TOWEL,OR,DSP,ST,BLUE,STD,4/PK,20PK/CS: Brand: MEDLINE

## (undated) DEVICE — GLOVE ORANGE PI 8   MSG9080

## (undated) DEVICE — GEL K Y LUBRICATING JELLY TB 15 4OZFT